# Patient Record
Sex: FEMALE | Race: WHITE | Employment: UNEMPLOYED | URBAN - METROPOLITAN AREA
[De-identification: names, ages, dates, MRNs, and addresses within clinical notes are randomized per-mention and may not be internally consistent; named-entity substitution may affect disease eponyms.]

---

## 2020-05-07 ENCOUNTER — TELEPHONE (OUTPATIENT)
Dept: PRIMARY CARE CLINIC | Age: 60
End: 2020-05-07

## 2020-05-18 ENCOUNTER — HOSPITAL ENCOUNTER (OUTPATIENT)
Age: 60
Discharge: HOME | End: 2020-05-20

## 2020-05-18 LAB
ALBUMIN SERPL-MCNC: 4.1 G/DL (ref 3.5–5.2)
ALP BLD-CCNC: 72 U/L (ref 35–104)
ALT SERPL-CCNC: 6 U/L (ref 0–32)
ANION GAP SERPL CALCULATED.3IONS-SCNC: 18 MMOL/L (ref 7–16)
AST SERPL-CCNC: 10 U/L (ref 0–31)
BACTERIA: ABNORMAL /HPF
BASOPHILS ABSOLUTE: 0.06 E9/L (ref 0–0.2)
BASOPHILS RELATIVE PERCENT: 0.5 % (ref 0–2)
BILIRUB SERPL-MCNC: 0.3 MG/DL (ref 0–1.2)
BILIRUBIN URINE: ABNORMAL
BLOOD, URINE: NEGATIVE
BUN BLDV-MCNC: 15 MG/DL (ref 8–23)
CALCIUM SERPL-MCNC: 10 MG/DL (ref 8.6–10.2)
CHLORIDE BLD-SCNC: 99 MMOL/L (ref 98–107)
CHOLESTEROL, TOTAL: 253 MG/DL (ref 0–199)
CLARITY: ABNORMAL
CO2: 23 MMOL/L (ref 22–29)
COLOR: YELLOW
CREAT SERPL-MCNC: 0.9 MG/DL (ref 0.5–1)
EOSINOPHILS ABSOLUTE: 0.12 E9/L (ref 0.05–0.5)
EOSINOPHILS RELATIVE PERCENT: 1.1 % (ref 0–6)
GFR AFRICAN AMERICAN: >60
GFR NON-AFRICAN AMERICAN: >60 ML/MIN/1.73
GLUCOSE BLD-MCNC: 155 MG/DL (ref 74–99)
GLUCOSE URINE: NEGATIVE MG/DL
HBA1C MFR BLD: 9 % (ref 4–5.6)
HCT VFR BLD CALC: 42.2 % (ref 34–48)
HDLC SERPL-MCNC: 37 MG/DL
HEMOGLOBIN: 14.4 G/DL (ref 11.5–15.5)
IMMATURE GRANULOCYTES #: 0.03 E9/L
IMMATURE GRANULOCYTES %: 0.3 % (ref 0–5)
KETONES, URINE: NEGATIVE MG/DL
LDL CHOLESTEROL CALCULATED: 171 MG/DL (ref 0–99)
LEUKOCYTE ESTERASE, URINE: ABNORMAL
LYMPHOCYTES ABSOLUTE: 3.61 E9/L (ref 1.5–4)
LYMPHOCYTES RELATIVE PERCENT: 32.9 % (ref 20–42)
MCH RBC QN AUTO: 31 PG (ref 26–35)
MCHC RBC AUTO-ENTMCNC: 34.1 % (ref 32–34.5)
MCV RBC AUTO: 90.9 FL (ref 80–99.9)
MONOCYTES ABSOLUTE: 0.6 E9/L (ref 0.1–0.95)
MONOCYTES RELATIVE PERCENT: 5.5 % (ref 2–12)
NEUTROPHILS ABSOLUTE: 6.54 E9/L (ref 1.8–7.3)
NEUTROPHILS RELATIVE PERCENT: 59.7 % (ref 43–80)
NITRITE, URINE: NEGATIVE
PDW BLD-RTO: 13.2 FL (ref 11.5–15)
PH UA: 6 (ref 5–9)
PLATELET # BLD: 424 E9/L (ref 130–450)
PMV BLD AUTO: 9.8 FL (ref 7–12)
POTASSIUM SERPL-SCNC: 4.1 MMOL/L (ref 3.5–5)
PROTEIN UA: 100 MG/DL
RBC # BLD: 4.64 E12/L (ref 3.5–5.5)
RBC UA: ABNORMAL /HPF (ref 0–2)
RENAL EPITHELIAL, UA: ABNORMAL /HPF
SODIUM BLD-SCNC: 140 MMOL/L (ref 132–146)
SPECIFIC GRAVITY UA: >=1.03 (ref 1–1.03)
TOTAL PROTEIN: 7.3 G/DL (ref 6.4–8.3)
TRIGL SERPL-MCNC: 223 MG/DL (ref 0–149)
UROBILINOGEN, URINE: 1 E.U./DL
VLDLC SERPL CALC-MCNC: 45 MG/DL
WBC # BLD: 11 E9/L (ref 4.5–11.5)
WBC UA: ABNORMAL /HPF (ref 0–5)

## 2020-05-18 PROCEDURE — 83036 HEMOGLOBIN GLYCOSYLATED A1C: CPT

## 2020-05-18 PROCEDURE — 36415 COLL VENOUS BLD VENIPUNCTURE: CPT

## 2020-05-18 PROCEDURE — 85025 COMPLETE CBC W/AUTO DIFF WBC: CPT

## 2020-05-18 PROCEDURE — 81001 URINALYSIS AUTO W/SCOPE: CPT

## 2020-05-18 PROCEDURE — 80061 LIPID PANEL: CPT

## 2020-05-18 PROCEDURE — 80053 COMPREHEN METABOLIC PANEL: CPT

## 2020-05-20 ENCOUNTER — OFFICE VISIT (OUTPATIENT)
Dept: PODIATRY | Age: 60
End: 2020-05-20
Payer: COMMERCIAL

## 2020-05-20 ENCOUNTER — TELEPHONE (OUTPATIENT)
Dept: PRIMARY CARE CLINIC | Age: 60
End: 2020-05-20

## 2020-05-20 VITALS
DIASTOLIC BLOOD PRESSURE: 78 MMHG | BODY MASS INDEX: 30.65 KG/M2 | HEIGHT: 63 IN | WEIGHT: 173 LBS | SYSTOLIC BLOOD PRESSURE: 123 MMHG | TEMPERATURE: 97.1 F

## 2020-05-20 PROCEDURE — 11721 DEBRIDE NAIL 6 OR MORE: CPT | Performed by: PODIATRIST

## 2020-05-20 PROCEDURE — 99203 OFFICE O/P NEW LOW 30 MIN: CPT | Performed by: PODIATRIST

## 2020-05-20 PROCEDURE — 3052F HG A1C>EQUAL 8.0%<EQUAL 9.0%: CPT | Performed by: PODIATRIST

## 2020-05-20 RX ORDER — KETOCONAZOLE 20 MG/ML
SHAMPOO TOPICAL
Qty: 100 ML | Refills: 1 | Status: SHIPPED
Start: 2020-05-20 | End: 2020-10-10

## 2020-05-20 RX ORDER — KETOCONAZOLE 20 MG/G
CREAM TOPICAL
Qty: 30 G | Refills: 1 | Status: SHIPPED
Start: 2020-05-20 | End: 2020-10-10

## 2020-05-20 RX ORDER — TRAMADOL HYDROCHLORIDE 50 MG/1
50 TABLET ORAL NIGHTLY
Qty: 30 TABLET | Refills: 0 | Status: SHIPPED | OUTPATIENT
Start: 2020-05-20 | End: 2020-06-19

## 2020-05-20 NOTE — PROGRESS NOTES
[facility] PODIATRY  [address] [facility] [place] ORE[place]Y [address]  Dept: [phone]  Dept Fax: [phone]    NEW PATIENT PROGRESS NOTE  Date of patient's visit: 5/20/2020  Patient's Name:  Julienne Hamman YOB: 1960            Patient Care Team:  DO Adam as PCP - General        Chief Complaint   Patient presents with   Meir Millan New Doctor     dm foot ck and neuropathy ck saw PCP Dr. Rebecca Oneill on 5/7/2020    Diabetes       HPI  HPI:   Julienne Hamman is a 60 y.o. female who presents to the office today complaining of diabetic foot pain. This new patient is seen today for a full diabetic exam of lower extremity. Patient is unable to trim her nails that are very thick and painful. Patient relates that she has neuropathy diabetic neuropathy which has progressively gotten worse the pain is mostly at bedtime and during the night the last 4 to 5 months it has been waking her up during the day she is feeling much better. .     Allergies   Allergen Reactions    Codeine     Pyridium [Phenazopyridine Hcl]        Past Medical History:   Diagnosis Date    Diabetes mellitus ([address]     Hyperlipidemia     UTI (urinary tract infection)        Prior to Admission medications    Medication Sig Start Date End Date Taking? Authorizing Provider   ketoconazole (NIZORAL) 2 % shampoo Apply topically daily as needed. 5/20/20  Yes Pilolivier Reyes DPM   ketoconazole (NIZORAL) 2 % cream Apply topically daily. 5/20/20  Yes Joanne Reyes DPM   traMADol (ULTRAM) 50 MG tablet Take 1 tablet by mouth nightly for 30 days. Intended supply: 3 days.  Take lowest dose possible to manage pain 5/20/20 6/19/20 Yes Joanne Reyes DPM   aspirin 81 MG tablet Take 81 mg by mouth daily   Yes Historical Provider, MD   buPROPion [facility]) 150 MG extended release tablet Take 150 mg by mouth 2 times daily    Historical Provider, MD   Multiple Vitamins-Minerals (THERAPEUTIC MULTIVITAMIN-MINERALS) tablet Take 1 tablet by mouth daily    Historical Provider, MD   ibuprofen (ADVIL;MOTRIN) 200 MG tablet Take 200 mg by mouth every 6 hours as needed for Pain    Historical Provider, MD   traMADol (ULTRAM) 50 MG tablet Take 50 mg by mouth every 6 hours as needed for Pain    Historical Provider, MD   lansoprazole (PREVACID) 15 MG delayed release capsule Take 15 mg by mouth daily    Historical Provider, MD   Loratadine 10 MG CAPS Take by mouth    Historical Provider, MD   calcium carbonate 600 MG TABS tablet Take 1 tablet by mouth daily    Historical Provider, MD   metFORMIN (GLUCOPHAGE) 500 MG tablet Take 1,000 mg by mouth 2 times daily (with meals)    Historical Provider, MD   simvastatin (ZOCOR) 5 MG tablet Take 5 mg by mouth every morning     Historical Provider, MD   gabapentin (NEURONTIN) 100 MG capsule Take 300 mg by mouth nightly     Historical Provider, MD   glipiZIDE (GLUCOTROL) 5 MG tablet Take 5 mg by mouth 2 times daily (before meals)    Historical Provider, MD   sitaGLIPtin (JANUVIA) 100 MG tablet Take 100 mg by mouth nightly     Historical Provider, MD   melatonin 3 MG TABS tablet Take 10 mg by mouth daily as needed     Historical Provider, MD   insulin detemir (LEVEMIR) 100 UNIT/ML injection vial Inject 65 Units into the skin every morning     Historical Provider, MD       Past Surgical History:   Procedure Laterality Date    TOTAL NEPHRECTOMY Left 09/27/2016       No family history on file. Social History     Tobacco Use    Smoking status: Current Every Day Smoker     Packs/day: 0.50     Types: Cigarettes    Smokeless tobacco: Never Used   Substance Use Topics    Alcohol use: No       Review of Systems    Review of Systems:   History obtained from chart review and the patient  General ROS: negative for - chills, fatigue, fever, night sweats or weight gain  Constitutional: Negative for chills, diaphoresis, fatigue, fever and unexpected weight change.   Musculoskeletal:

## 2020-09-24 ENCOUNTER — APPOINTMENT (OUTPATIENT)
Dept: GENERAL RADIOLOGY | Age: 60
End: 2020-09-24
Payer: COMMERCIAL

## 2020-09-24 ENCOUNTER — APPOINTMENT (OUTPATIENT)
Dept: CT IMAGING | Age: 60
End: 2020-09-24
Payer: COMMERCIAL

## 2020-09-24 ENCOUNTER — HOSPITAL ENCOUNTER (EMERGENCY)
Age: 60
Discharge: HOME | End: 2020-09-24
Attending: EMERGENCY MEDICINE
Payer: COMMERCIAL

## 2020-09-24 VITALS
BODY MASS INDEX: 29.59 KG/M2 | HEIGHT: 63 IN | RESPIRATION RATE: 16 BRPM | OXYGEN SATURATION: 98 % | SYSTOLIC BLOOD PRESSURE: 120 MMHG | TEMPERATURE: 97.3 F | WEIGHT: 167 LBS | DIASTOLIC BLOOD PRESSURE: 65 MMHG | HEART RATE: 75 BPM

## 2020-09-24 LAB
ANION GAP SERPL CALCULATED.3IONS-SCNC: 10 MMOL/L (ref 7–16)
BASOPHILS ABSOLUTE: 0.06 E9/L (ref 0–0.2)
BASOPHILS RELATIVE PERCENT: 0.6 % (ref 0–2)
BUN BLDV-MCNC: 25 MG/DL (ref 8–23)
CALCIUM SERPL-MCNC: 9.5 MG/DL (ref 8.6–10.2)
CHLORIDE BLD-SCNC: 100 MMOL/L (ref 98–107)
CO2: 21 MMOL/L (ref 22–29)
CREAT SERPL-MCNC: 1.3 MG/DL (ref 0.5–1)
EOSINOPHILS ABSOLUTE: 0.1 E9/L (ref 0.05–0.5)
EOSINOPHILS RELATIVE PERCENT: 1 % (ref 0–6)
GFR AFRICAN AMERICAN: 50
GFR NON-AFRICAN AMERICAN: 42 ML/MIN/1.73
GLUCOSE BLD-MCNC: 173 MG/DL (ref 74–99)
HCT VFR BLD CALC: 35.6 % (ref 34–48)
HEMOGLOBIN: 12.4 G/DL (ref 11.5–15.5)
IMMATURE GRANULOCYTES #: 0.02 E9/L
IMMATURE GRANULOCYTES %: 0.2 % (ref 0–5)
LYMPHOCYTES ABSOLUTE: 2.33 E9/L (ref 1.5–4)
LYMPHOCYTES RELATIVE PERCENT: 23.7 % (ref 20–42)
MCH RBC QN AUTO: 31.7 PG (ref 26–35)
MCHC RBC AUTO-ENTMCNC: 34.8 % (ref 32–34.5)
MCV RBC AUTO: 91 FL (ref 80–99.9)
MONOCYTES ABSOLUTE: 0.55 E9/L (ref 0.1–0.95)
MONOCYTES RELATIVE PERCENT: 5.6 % (ref 2–12)
NEUTROPHILS ABSOLUTE: 6.77 E9/L (ref 1.8–7.3)
NEUTROPHILS RELATIVE PERCENT: 68.9 % (ref 43–80)
PDW BLD-RTO: 13 FL (ref 11.5–15)
PLATELET # BLD: 317 E9/L (ref 130–450)
PMV BLD AUTO: 9.1 FL (ref 7–12)
POTASSIUM SERPL-SCNC: 4.1 MMOL/L (ref 3.5–5)
RBC # BLD: 3.91 E12/L (ref 3.5–5.5)
SODIUM BLD-SCNC: 131 MMOL/L (ref 132–146)
TROPONIN: <0.01 NG/ML (ref 0–0.03)
WBC # BLD: 9.8 E9/L (ref 4.5–11.5)

## 2020-09-24 PROCEDURE — 84484 ASSAY OF TROPONIN QUANT: CPT

## 2020-09-24 PROCEDURE — 99284 EMERGENCY DEPT VISIT MOD MDM: CPT

## 2020-09-24 PROCEDURE — 80048 BASIC METABOLIC PNL TOTAL CA: CPT

## 2020-09-24 PROCEDURE — 85025 COMPLETE CBC W/AUTO DIFF WBC: CPT

## 2020-09-24 PROCEDURE — 93005 ELECTROCARDIOGRAM TRACING: CPT | Performed by: EMERGENCY MEDICINE

## 2020-09-24 PROCEDURE — 70450 CT HEAD/BRAIN W/O DYE: CPT

## 2020-09-24 PROCEDURE — 99283 EMERGENCY DEPT VISIT LOW MDM: CPT

## 2020-09-24 PROCEDURE — 71045 X-RAY EXAM CHEST 1 VIEW: CPT

## 2020-09-24 ASSESSMENT — ENCOUNTER SYMPTOMS
SHORTNESS OF BREATH: 0
COUGH: 0
SINUS PRESSURE: 0
BACK PAIN: 0
DIFFICULTY BREATHING: 0
EYE DISCHARGE: 0
WHEEZING: 0
NAUSEA: 0
DOUBLE VISION: 0
BLURRED VISION: 0
DIARRHEA: 0
EYE PAIN: 0
EYE REDNESS: 0
VOMITING: 0
SORE THROAT: 0
ABDOMINAL DISTENTION: 0

## 2020-09-24 ASSESSMENT — PAIN DESCRIPTION - FREQUENCY: FREQUENCY: CONTINUOUS

## 2020-09-24 ASSESSMENT — PAIN DESCRIPTION - DESCRIPTORS: DESCRIPTORS: OTHER (COMMENT)

## 2020-09-24 ASSESSMENT — PAIN - FUNCTIONAL ASSESSMENT: PAIN_FUNCTIONAL_ASSESSMENT: PREVENTS OR INTERFERES SOME ACTIVE ACTIVITIES AND ADLS

## 2020-09-24 ASSESSMENT — PAIN DESCRIPTION - LOCATION: LOCATION: HEAD

## 2020-09-24 ASSESSMENT — PAIN SCALES - GENERAL: PAINLEVEL_OUTOF10: 9

## 2020-09-24 ASSESSMENT — PAIN DESCRIPTION - ORIENTATION: ORIENTATION: LEFT;POSTERIOR

## 2020-09-24 ASSESSMENT — PAIN DESCRIPTION - ONSET: ONSET: SUDDEN

## 2020-09-24 ASSESSMENT — PAIN DESCRIPTION - PAIN TYPE: TYPE: ACUTE PAIN

## 2020-09-24 NOTE — ED PROVIDER NOTES
70-year-old female presents to the emergency department after she had a fall and hit her head on cement earlier today. She states that she did not eat all day today and this is why she believes she became dizzy. She states she got dizzy and fell hitting her head on cement she states she had some mild bleeding but that was her has resolved. She states she is on no blood thinners she states mild low back pain and left elbow pain but states no other injuries. She states no other complaints at this time had no loss of consciousness and no other symptoms. The history is provided by the patient. Head Injury   Location:  L parietal  Time since incident:  2 hours  Mechanism of injury: fall    Fall:     Impact surface:  [place]    Point of impact:  Head  Pain details:     Quality:  Aching    Severity:  Mild    Duration:  2 hours    Timing:  Intermittent    Progression:  Waxing and waning  Chronicity:  New  Relieved by:  Nothing  Worsened by:  Nothing  Ineffective treatments:  None tried  Associated symptoms: no blurred vision, no difficulty breathing, no double vision, no headaches, no hearing loss, no nausea, no neck pain, no numbness, no tinnitus and no vomiting         Review of Systems   Constitutional: Negative for chills and fever. HENT: Negative for ear pain, hearing loss, sinus pressure, sore throat and tinnitus. Eyes: Negative for blurred vision, double vision, pain, discharge and redness. Respiratory: Negative for cough, shortness of breath and wheezing. Cardiovascular: Negative for chest pain. Gastrointestinal: Negative for abdominal distention, diarrhea, nausea and vomiting. Genitourinary: Negative for dysuria and frequency. Musculoskeletal: Negative for arthralgias, back pain and neck pain. Skin: Negative for rash and wound. Neurological: Positive for dizziness. Negative for weakness, numbness and headaches. Hematological: Negative for adenopathy.    All other systems reviewed total nephrectomy (Left, 09/27/2016). Social History:  reports that she has been smoking cigarettes. She has been smoking about 0.50 packs per day. She has never used smokeless tobacco. She reports that she does not drink alcohol or use drugs. Family History: family history is not on file. The patients home medications have been reviewed.     Allergies: Codeine and Pyridium [phenazopyridine hcl]    -------------------------------------------------- RESULTS -------------------------------------------------  Labs:  Results for orders placed or performed during the hospital encounter of 09/24/20   CBC Auto Differential   Result Value Ref Range    WBC 9.8 4.5 - 11.5 E9/L    RBC 3.91 3.50 - 5.50 E12/L    Hemoglobin 12.4 11.5 - 15.5 g/dL    Hematocrit 35.6 34.0 - 48.0 %    MCV 91.0 80.0 - 99.9 fL    MCH 31.7 26.0 - 35.0 pg    MCHC 34.8 (H) 32.0 - 34.5 %    RDW 13.0 11.5 - 15.0 fL    Platelets 598 076 - 291 E9/L    MPV 9.1 7.0 - 12.0 fL    Neutrophils % 68.9 43.0 - 80.0 %    Immature Granulocytes % 0.2 0.0 - 5.0 %    Lymphocytes % 23.7 20.0 - 42.0 %    Monocytes % 5.6 2.0 - 12.0 %    Eosinophils % 1.0 0.0 - 6.0 %    Basophils % 0.6 0.0 - 2.0 %    Neutrophils Absolute 6.77 1.80 - 7.30 E9/L    Immature Granulocytes # 0.02 E9/L    Lymphocytes Absolute 2.33 1.50 - 4.00 E9/L    Monocytes Absolute 0.55 0.10 - 0.95 E9/L    Eosinophils Absolute 0.10 0.05 - 0.50 E9/L    Basophils Absolute 0.06 0.00 - 0.20 W2/E   Basic Metabolic Panel   Result Value Ref Range    Sodium 131 (L) 132 - 146 mmol/L    Potassium 4.1 3.5 - 5.0 mmol/L    Chloride 100 98 - 107 mmol/L    CO2 21 (L) 22 - 29 mmol/L    Anion Gap 10 7 - 16 mmol/L    Glucose 173 (H) 74 - 99 mg/dL    BUN 25 (H) 8 - 23 mg/dL    CREATININE 1.3 (H) 0.5 - 1.0 mg/dL    GFR Non-African American 42 >=60 mL/min/1.73    GFR African American 50     Calcium 9.5 8.6 - 10.2 mg/dL   Troponin   Result Value Ref Range    Troponin <0.01 0.00 - 0.03 ng/mL   EKG 12 Lead   Result Value Ref Range    Ventricular Rate 70 BPM    Atrial Rate 70 BPM    P-R Interval 158 ms    QRS Duration 64 ms    Q-T Interval 388 ms    QTc Calculation (Bazett) 419 ms    P Axis 60 degrees    R Axis 53 degrees    T Axis 55 degrees       Radiology:  XR CHEST PORTABLE   Final Result   No acute cardiopulmonary process. CT HEAD WO CONTRAST   Final Result   No indication for an acute intracranial process. EKG: This EKG is signed and interpreted by the EP. Time: 18:42  Rate: 70  Rhythm: Sinus  Interpretation: NSR, low voltage,   Comparison: stable as compared to patient's most recent EKG    ------------------------- NURSING NOTES AND VITALS REVIEWED ---------------------------  Date / Time Roomed:  9/24/2020  4:58 PM  ED Bed Assignment:  [facility] [id number]    The nursing notes within the ED encounter and vital signs as below have been reviewed. /65   Pulse 75   Temp 97.3 °F (36.3 °C) (Temporal)   Resp 16   Ht 5' 3\" (1.6 m)   Wt 167 lb (75.8 kg)   SpO2 98%   BMI 29.58 kg/m²   Oxygen Saturation Interpretation: Normal      ------------------------------------------ PROGRESS NOTES ------------------------------------------  I have spoken with the patient and discussed todays results, in addition to providing specific details for the plan of care and counseling regarding the diagnosis and prognosis. Their questions are answered at this time and they are agreeable with the plan. I discussed at length with them reasons for immediate return here for re evaluation. They will followup with their primary care physician by calling their office tomorrow. --------------------------------- ADDITIONAL PROVIDER NOTES ---------------------------------  At this time the patient is without objective evidence of an acute process requiring hospitalization or inpatient management. They have remained hemodynamically stable throughout their entire ED visit and are stable for discharge with outpatient follow-up.      The plan has been discussed in detail and they are aware of the specific conditions for emergent return, as well as the importance of follow-up. Discharge Medication List as of 9/24/2020  6:43 PM          Diagnosis:  1. Injury of head, initial encounter    2. Dizziness    3. Abrasion of left elbow, initial encounter        Disposition:  Patient's disposition: Discharge to home  Patient's condition is stable.          Pal Telles DO  09/27/20 [id number]

## 2020-09-25 LAB
EKG ATRIAL RATE: 70 BPM
EKG P AXIS: 60 DEGREES
EKG P-R INTERVAL: 158 MS
EKG Q-T INTERVAL: 388 MS
EKG QRS DURATION: 64 MS
EKG QTC CALCULATION (BAZETT): 419 MS
EKG R AXIS: 53 DEGREES
EKG T AXIS: 55 DEGREES
EKG VENTRICULAR RATE: 70 BPM

## 2020-10-06 ENCOUNTER — HOSPITAL ENCOUNTER (OUTPATIENT)
Age: 60
Discharge: HOME | End: 2020-10-08
Payer: COMMERCIAL

## 2020-10-06 ENCOUNTER — OFFICE VISIT (OUTPATIENT)
Dept: FAMILY MEDICINE CLINIC | Age: 60
End: 2020-10-06
Payer: COMMERCIAL

## 2020-10-06 VITALS
TEMPERATURE: 96.7 F | DIASTOLIC BLOOD PRESSURE: 78 MMHG | HEIGHT: 63 IN | RESPIRATION RATE: 18 BRPM | OXYGEN SATURATION: 98 % | BODY MASS INDEX: 30.12 KG/M2 | SYSTOLIC BLOOD PRESSURE: 126 MMHG | HEART RATE: 75 BPM | WEIGHT: 170 LBS

## 2020-10-06 PROBLEM — E86.0 DEHYDRATION: Status: ACTIVE | Noted: 2020-10-06

## 2020-10-06 PROBLEM — E11.9 TYPE 2 DIABETES MELLITUS WITHOUT COMPLICATION, WITHOUT LONG-TERM CURRENT USE OF INSULIN (HCC): Status: ACTIVE | Noted: 2020-10-06

## 2020-10-06 PROBLEM — G93.89 MASS, BRAIN: Status: ACTIVE | Noted: 2020-10-06

## 2020-10-06 PROBLEM — R55 VASOVAGAL SYNCOPE: Status: ACTIVE | Noted: 2020-10-06

## 2020-10-06 LAB
ALBUMIN SERPL-MCNC: 4.1 G/DL (ref 3.5–5.2)
ALP BLD-CCNC: 78 U/L (ref 35–104)
ALT SERPL-CCNC: 7 U/L (ref 0–32)
ANION GAP SERPL CALCULATED.3IONS-SCNC: 16 MMOL/L (ref 7–16)
AST SERPL-CCNC: 13 U/L (ref 0–31)
BASOPHILS ABSOLUTE: 0.05 E9/L (ref 0–0.2)
BASOPHILS RELATIVE PERCENT: 0.5 % (ref 0–2)
BILIRUB SERPL-MCNC: 0.2 MG/DL (ref 0–1.2)
BUN BLDV-MCNC: 21 MG/DL (ref 8–23)
CALCIUM SERPL-MCNC: 9.7 MG/DL (ref 8.6–10.2)
CHLORIDE BLD-SCNC: 102 MMOL/L (ref 98–107)
CHOLESTEROL, TOTAL: 209 MG/DL (ref 0–199)
CO2: 20 MMOL/L (ref 22–29)
CREAT SERPL-MCNC: 2 MG/DL (ref 0.5–1)
EOSINOPHILS ABSOLUTE: 0.18 E9/L (ref 0.05–0.5)
EOSINOPHILS RELATIVE PERCENT: 1.8 % (ref 0–6)
GFR AFRICAN AMERICAN: 31
GFR NON-AFRICAN AMERICAN: 25 ML/MIN/1.73
GLUCOSE BLD-MCNC: 134 MG/DL (ref 74–99)
HBA1C MFR BLD: 6.8 % (ref 4–5.6)
HCT VFR BLD CALC: 40.7 % (ref 34–48)
HDLC SERPL-MCNC: 38 MG/DL
HEMOGLOBIN: 13 G/DL (ref 11.5–15.5)
IMMATURE GRANULOCYTES #: 0.02 E9/L
IMMATURE GRANULOCYTES %: 0.2 % (ref 0–5)
LDL CHOLESTEROL CALCULATED: 130 MG/DL (ref 0–99)
LYMPHOCYTES ABSOLUTE: 2.94 E9/L (ref 1.5–4)
LYMPHOCYTES RELATIVE PERCENT: 29.3 % (ref 20–42)
MCH RBC QN AUTO: 31.1 PG (ref 26–35)
MCHC RBC AUTO-ENTMCNC: 31.9 % (ref 32–34.5)
MCV RBC AUTO: 97.4 FL (ref 80–99.9)
MONOCYTES ABSOLUTE: 0.6 E9/L (ref 0.1–0.95)
MONOCYTES RELATIVE PERCENT: 6 % (ref 2–12)
NEUTROPHILS ABSOLUTE: 6.26 E9/L (ref 1.8–7.3)
NEUTROPHILS RELATIVE PERCENT: 62.2 % (ref 43–80)
PDW BLD-RTO: 13.2 FL (ref 11.5–15)
PLATELET # BLD: 366 E9/L (ref 130–450)
PMV BLD AUTO: 9.6 FL (ref 7–12)
POTASSIUM SERPL-SCNC: 5.1 MMOL/L (ref 3.5–5)
RBC # BLD: 4.18 E12/L (ref 3.5–5.5)
SODIUM BLD-SCNC: 138 MMOL/L (ref 132–146)
T4 TOTAL: 7.9 MCG/DL (ref 4.5–11.7)
TOTAL PROTEIN: 7.2 G/DL (ref 6.4–8.3)
TRIGL SERPL-MCNC: 204 MG/DL (ref 0–149)
TSH SERPL DL<=0.05 MIU/L-ACNC: 0.89 UIU/ML (ref 0.27–4.2)
VITAMIN D 25-HYDROXY: 11 NG/ML (ref 30–100)
VLDLC SERPL CALC-MCNC: 41 MG/DL
WBC # BLD: 10.1 E9/L (ref 4.5–11.5)

## 2020-10-06 PROCEDURE — 84443 ASSAY THYROID STIM HORMONE: CPT

## 2020-10-06 PROCEDURE — 84436 ASSAY OF TOTAL THYROXINE: CPT

## 2020-10-06 PROCEDURE — 82306 VITAMIN D 25 HYDROXY: CPT

## 2020-10-06 PROCEDURE — 3052F HG A1C>EQUAL 8.0%<EQUAL 9.0%: CPT | Performed by: FAMILY MEDICINE

## 2020-10-06 PROCEDURE — 36415 COLL VENOUS BLD VENIPUNCTURE: CPT

## 2020-10-06 PROCEDURE — 80061 LIPID PANEL: CPT

## 2020-10-06 PROCEDURE — 83036 HEMOGLOBIN GLYCOSYLATED A1C: CPT

## 2020-10-06 PROCEDURE — 80053 COMPREHEN METABOLIC PANEL: CPT

## 2020-10-06 PROCEDURE — 99204 OFFICE O/P NEW MOD 45 MIN: CPT | Performed by: FAMILY MEDICINE

## 2020-10-06 PROCEDURE — 85025 COMPLETE CBC W/AUTO DIFF WBC: CPT

## 2020-10-06 RX ORDER — GLIPIZIDE 5 MG/1
TABLET ORAL
Qty: 30 TABLET | Refills: 1 | Status: SHIPPED
Start: 2020-10-06 | End: 2021-02-25

## 2020-10-06 ASSESSMENT — PATIENT HEALTH QUESTIONNAIRE - PHQ9
SUM OF ALL RESPONSES TO PHQ QUESTIONS 1-9: 0
SUM OF ALL RESPONSES TO PHQ9 QUESTIONS 1 & 2: 0
2. FEELING DOWN, DEPRESSED OR HOPELESS: 0
SUM OF ALL RESPONSES TO PHQ QUESTIONS 1-9: 0
1. LITTLE INTEREST OR PLEASURE IN DOING THINGS: 0

## 2020-10-06 ASSESSMENT — ENCOUNTER SYMPTOMS
EYES NEGATIVE: 1
RESPIRATORY NEGATIVE: 1
ALLERGIC/IMMUNOLOGIC NEGATIVE: 1
GASTROINTESTINAL NEGATIVE: 1

## 2020-10-06 NOTE — PROGRESS NOTES
10/6/20  Name: Frankie Long    : 1960    Sex: female    Age: 63 y.o. Subjective:  Chief Complaint: Patient is here for follow up from er     Not to me in yearls      Two wek sago  To  Er with  Getting  Dizzy and fell back on head  At work      [employer] down    Lab with   elev  Kidney func nd  bs  Wt dwon from  250  She  Say snto on diet     Here with Gf  Ct in er with left postr  Cerebellar   Lesion  She had lab  May and  [facility] to see me  After      ghb high    Was on diab med and chol med in past   Super non comapolint      Review of Systems   Constitutional: Negative. HENT: Negative. Eyes: Negative. Respiratory: Negative. Cardiovascular: Negative. Gastrointestinal: Negative. Endocrine: Negative. Genitourinary: Negative. Musculoskeletal: Negative. Skin: Negative. Allergic/Immunologic: Negative. Neurological: Negative. Hematological: Negative. Psychiatric/Behavioral: Negative. Current Outpatient Medications:     glipiZIDE (GLUCOTROL) 5 MG tablet, One half tab before breakfast and supper, Disp: 30 tablet, Rfl: 1    ketoconazole (NIZORAL) 2 % shampoo, Apply topically daily as needed. , Disp: 100 mL, Rfl: 1    ketoconazole (NIZORAL) 2 % cream, Apply topically daily. , Disp: 30 g, Rfl: 1    buPROPion (WELLBUTRIN SR) 150 MG extended release tablet, Take 150 mg by mouth 2 times daily, Disp: , Rfl:     Multiple Vitamins-Minerals (THERAPEUTIC MULTIVITAMIN-MINERALS) tablet, Take 1 tablet by mouth daily, Disp: , Rfl:     ibuprofen (ADVIL;MOTRIN) 200 MG tablet, Take 200 mg by mouth every 6 hours as needed for Pain, Disp: , Rfl:     traMADol (ULTRAM) 50 MG tablet, Take 50 mg by mouth every 6 hours as needed for Pain, Disp: , Rfl:     lansoprazole (PREVACID) 15 MG delayed release capsule, Take 15 mg by mouth daily, Disp: , Rfl:     Loratadine 10 MG CAPS, Take by mouth, Disp: , Rfl:     calcium carbonate 600 MG TABS tablet, Take 1 tablet by mouth daily, Disp: , Rfl:     metFORMIN (GLUCOPHAGE) 500 MG tablet, Take 1,000 mg by mouth 2 times daily (with meals), Disp: , Rfl:     aspirin 81 MG tablet, Take 81 mg by mouth daily, Disp: , Rfl:     simvastatin (ZOCOR) 5 MG tablet, Take 5 mg by mouth every morning , Disp: , Rfl:     gabapentin (NEURONTIN) 100 MG capsule, Take 300 mg by mouth nightly , Disp: , Rfl:     glipiZIDE (GLUCOTROL) 5 MG tablet, Take 5 mg by mouth 2 times daily (before meals), Disp: , Rfl:     sitaGLIPtin (JANUVIA) 100 MG tablet, Take 100 mg by mouth nightly , Disp: , Rfl:     melatonin 3 MG TABS tablet, Take 10 mg by mouth daily as needed , Disp: , Rfl:     insulin detemir (LEVEMIR) 100 UNIT/ML injection vial, Inject 65 Units into the skin every morning , Disp: , Rfl:   Allergies   Allergen Reactions    Codeine     Pyridium [Phenazopyridine Hcl]      Social History     Socioeconomic History    Marital status:      Spouse name: Not on file    Number of children: Not on file    Years of education: Not on file    Highest education level: Not on file   Occupational History    Not on file   Social Needs    Financial resource strain: Not on file    Food insecurity     Worry: Not on file     Inability: Not on file    Transportation needs     Medical: Not on file     Non-medical: Not on file   Tobacco Use    Smoking status: Current Every Day Smoker     Packs/day: 0.50     Types: Cigarettes    Smokeless tobacco: Never Used   Substance and Sexual Activity    Alcohol use: No    Drug use: No    Sexual activity: Not on file   Lifestyle    Physical activity     Days per week: Not on file     Minutes per session: Not on file    Stress: Not on file   Relationships    Social connections     Talks on phone: Not on file     Gets together: Not on file     Attends Mu-ism service: Not on file     Active member of club or organization: Not on file     Attends meetings of clubs or organizations: Not on file     Relationship status: Not on file   Claude Sellers issues. Also reviewing the chart before entering the room with patient and finishing charting after leaving patient's room. More than half of that time was spent face to face with the patient in counseling and coordinating care. Sx to er      admised not work and pt refuses  ---pt  tody ----    Follow Up: Return for aturday am with  bs.      Seen by:  Yaima Naylor, DO

## 2020-10-07 ENCOUNTER — OFFICE VISIT (OUTPATIENT)
Dept: FAMILY MEDICINE CLINIC | Age: 60
End: 2020-10-07
Payer: COMMERCIAL

## 2020-10-07 ENCOUNTER — HOSPITAL ENCOUNTER (EMERGENCY)
Age: 60
Discharge: HOME | End: 2020-10-07
Attending: EMERGENCY MEDICINE
Payer: COMMERCIAL

## 2020-10-07 ENCOUNTER — TELEPHONE (OUTPATIENT)
Dept: PRIMARY CARE CLINIC | Age: 60
End: 2020-10-07

## 2020-10-07 VITALS
WEIGHT: 170 LBS | DIASTOLIC BLOOD PRESSURE: 82 MMHG | SYSTOLIC BLOOD PRESSURE: 128 MMHG | BODY MASS INDEX: 30.11 KG/M2 | TEMPERATURE: 97.8 F

## 2020-10-07 VITALS
HEART RATE: 69 BPM | RESPIRATION RATE: 18 BRPM | WEIGHT: 170 LBS | OXYGEN SATURATION: 98 % | DIASTOLIC BLOOD PRESSURE: 64 MMHG | SYSTOLIC BLOOD PRESSURE: 129 MMHG | TEMPERATURE: 98.4 F | HEIGHT: 63 IN | BODY MASS INDEX: 30.12 KG/M2

## 2020-10-07 LAB
ALBUMIN SERPL-MCNC: 4.2 G/DL (ref 3.5–5.2)
ALP BLD-CCNC: 86 U/L (ref 35–104)
ALT SERPL-CCNC: 8 U/L (ref 0–32)
ANION GAP SERPL CALCULATED.3IONS-SCNC: 11 MMOL/L (ref 7–16)
APTT: 30.3 SEC (ref 24.5–35.1)
AST SERPL-CCNC: 11 U/L (ref 0–31)
BASOPHILS ABSOLUTE: 0.05 E9/L (ref 0–0.2)
BASOPHILS RELATIVE PERCENT: 0.5 % (ref 0–2)
BILIRUB SERPL-MCNC: 0.4 MG/DL (ref 0–1.2)
BUN BLDV-MCNC: 21 MG/DL (ref 8–23)
CALCIUM SERPL-MCNC: 9.9 MG/DL (ref 8.6–10.2)
CHLORIDE BLD-SCNC: 103 MMOL/L (ref 98–107)
CO2: 24 MMOL/L (ref 22–29)
CREAT SERPL-MCNC: 1.5 MG/DL (ref 0.5–1)
EKG ATRIAL RATE: 71 BPM
EKG P AXIS: 48 DEGREES
EKG P-R INTERVAL: 134 MS
EKG Q-T INTERVAL: 398 MS
EKG QRS DURATION: 80 MS
EKG QTC CALCULATION (BAZETT): 432 MS
EKG R AXIS: 48 DEGREES
EKG T AXIS: 45 DEGREES
EKG VENTRICULAR RATE: 71 BPM
EOSINOPHILS ABSOLUTE: 0.16 E9/L (ref 0.05–0.5)
EOSINOPHILS RELATIVE PERCENT: 1.5 % (ref 0–6)
GFR AFRICAN AMERICAN: 43
GFR NON-AFRICAN AMERICAN: 35 ML/MIN/1.73
GLUCOSE BLD-MCNC: 138 MG/DL (ref 74–99)
HCT VFR BLD CALC: 39.8 % (ref 34–48)
HEMOGLOBIN: 13.4 G/DL (ref 11.5–15.5)
IMMATURE GRANULOCYTES #: 0.04 E9/L
IMMATURE GRANULOCYTES %: 0.4 % (ref 0–5)
INR BLD: 1
LYMPHOCYTES ABSOLUTE: 2.96 E9/L (ref 1.5–4)
LYMPHOCYTES RELATIVE PERCENT: 27.1 % (ref 20–42)
MCH RBC QN AUTO: 31.8 PG (ref 26–35)
MCHC RBC AUTO-ENTMCNC: 33.7 % (ref 32–34.5)
MCV RBC AUTO: 94.3 FL (ref 80–99.9)
MONOCYTES ABSOLUTE: 0.56 E9/L (ref 0.1–0.95)
MONOCYTES RELATIVE PERCENT: 5.1 % (ref 2–12)
NEUTROPHILS ABSOLUTE: 7.14 E9/L (ref 1.8–7.3)
NEUTROPHILS RELATIVE PERCENT: 65.4 % (ref 43–80)
PDW BLD-RTO: 13.1 FL (ref 11.5–15)
PLATELET # BLD: 385 E9/L (ref 130–450)
PMV BLD AUTO: 9.3 FL (ref 7–12)
POTASSIUM REFLEX MAGNESIUM: 4.3 MMOL/L (ref 3.5–5)
PROTHROMBIN TIME: 11.5 SEC (ref 9.3–12.4)
RBC # BLD: 4.22 E12/L (ref 3.5–5.5)
SODIUM BLD-SCNC: 138 MMOL/L (ref 132–146)
TOTAL PROTEIN: 7.6 G/DL (ref 6.4–8.3)
WBC # BLD: 10.9 E9/L (ref 4.5–11.5)

## 2020-10-07 PROCEDURE — 93005 ELECTROCARDIOGRAM TRACING: CPT | Performed by: EMERGENCY MEDICINE

## 2020-10-07 PROCEDURE — 99283 EMERGENCY DEPT VISIT LOW MDM: CPT

## 2020-10-07 PROCEDURE — 85025 COMPLETE CBC W/AUTO DIFF WBC: CPT

## 2020-10-07 PROCEDURE — 2580000003 HC RX 258: Performed by: EMERGENCY MEDICINE

## 2020-10-07 PROCEDURE — 85730 THROMBOPLASTIN TIME PARTIAL: CPT

## 2020-10-07 PROCEDURE — 99213 OFFICE O/P EST LOW 20 MIN: CPT | Performed by: FAMILY MEDICINE

## 2020-10-07 PROCEDURE — 85610 PROTHROMBIN TIME: CPT

## 2020-10-07 PROCEDURE — 80053 COMPREHEN METABOLIC PANEL: CPT

## 2020-10-07 RX ORDER — 0.9 % SODIUM CHLORIDE 0.9 %
1000 INTRAVENOUS SOLUTION INTRAVENOUS ONCE
Status: COMPLETED | OUTPATIENT
Start: 2020-10-07 | End: 2020-10-07

## 2020-10-07 RX ORDER — MECLIZINE HYDROCHLORIDE 25 MG/1
25 TABLET ORAL 3 TIMES DAILY PRN
Qty: 15 TABLET | Refills: 0 | Status: SHIPPED | OUTPATIENT
Start: 2020-10-07 | End: 2020-10-10

## 2020-10-07 RX ADMIN — SODIUM CHLORIDE 1000 ML: 9 INJECTION, SOLUTION INTRAVENOUS at 11:27

## 2020-10-07 ASSESSMENT — ENCOUNTER SYMPTOMS
GASTROINTESTINAL NEGATIVE: 1
RESPIRATORY NEGATIVE: 1

## 2020-10-07 NOTE — PROGRESS NOTES
10/7/20  Name: Gisel Haile    : 1960    Sex: female    Age: 63 y.o. Subjective:  Chief Complaint: Patient is here for er follow up     This am   Went to bathroom and saw lights and lied down and felt fine   I saw lab and sent to er  This am  They  Gave her  Iv fludsi and and crea  Dec  1-5  From  2-0       nto ck bs  At home      Review of Systems   Respiratory: Negative. Cardiovascular: Negative. Gastrointestinal: Negative. Current Outpatient Medications:     meclizine (ANTIVERT) 25 MG tablet, Take 1 tablet by mouth 3 times daily as needed for Dizziness, Disp: 15 tablet, Rfl: 0    glipiZIDE (GLUCOTROL) 5 MG tablet, One half tab before breakfast and supper, Disp: 30 tablet, Rfl: 1    ketoconazole (NIZORAL) 2 % shampoo, Apply topically daily as needed. , Disp: 100 mL, Rfl: 1    ketoconazole (NIZORAL) 2 % cream, Apply topically daily. , Disp: 30 g, Rfl: 1    buPROPion (WELLBUTRIN SR) 150 MG extended release tablet, Take 150 mg by mouth 2 times daily, Disp: , Rfl:     Multiple Vitamins-Minerals (THERAPEUTIC MULTIVITAMIN-MINERALS) tablet, Take 1 tablet by mouth daily, Disp: , Rfl:     ibuprofen (ADVIL;MOTRIN) 200 MG tablet, Take 200 mg by mouth every 6 hours as needed for Pain, Disp: , Rfl:     traMADol (ULTRAM) 50 MG tablet, Take 50 mg by mouth every 6 hours as needed for Pain, Disp: , Rfl:     lansoprazole (PREVACID) 15 MG delayed release capsule, Take 15 mg by mouth daily, Disp: , Rfl:     Loratadine 10 MG CAPS, Take by mouth, Disp: , Rfl:     calcium carbonate 600 MG TABS tablet, Take 1 tablet by mouth daily, Disp: , Rfl:     metFORMIN (GLUCOPHAGE) 500 MG tablet, Take 1,000 mg by mouth 2 times daily (with meals), Disp: , Rfl:     aspirin 81 MG tablet, Take 81 mg by mouth daily, Disp: , Rfl:     simvastatin (ZOCOR) 5 MG tablet, Take 5 mg by mouth every morning , Disp: , Rfl:     gabapentin (NEURONTIN) 100 MG capsule, Take 300 mg by mouth nightly , Disp: , Rfl:    glipiZIDE (GLUCOTROL) 5 MG tablet, Take 5 mg by mouth 2 times daily (before meals), Disp: , Rfl:     sitaGLIPtin (JANUVIA) 100 MG tablet, Take 100 mg by mouth nightly , Disp: , Rfl:     melatonin 3 MG TABS tablet, Take 10 mg by mouth daily as needed , Disp: , Rfl:     insulin detemir (LEVEMIR) 100 UNIT/ML injection vial, Inject 65 Units into the skin every morning , Disp: , Rfl:   Allergies   Allergen Reactions    Codeine     Pyridium [Phenazopyridine Hcl]      Social History     Socioeconomic History    Marital status:      Spouse name: Not on file    Number of children: Not on file    Years of education: Not on file    Highest education level: Not on file   Occupational History    Not on file   Social Needs    Financial resource strain: Not on file    Food insecurity     Worry: Not on file     Inability: Not on file    Transportation needs     Medical: Not on file     Non-medical: Not on file   Tobacco Use    Smoking status: Current Every Day Smoker     Packs/day: 0.50     Types: Cigarettes    Smokeless tobacco: Never Used   Substance and Sexual Activity    Alcohol use: No    Drug use: No    Sexual activity: Not on file   Lifestyle    Physical activity     Days per week: Not on file     Minutes per session: Not on file    Stress: Not on file   Relationships    Social connections     Talks on phone: Not on file     Gets together: Not on file     Attends [employer] service: Not on file     Active member of club or organization: Not on file     Attends meetings of clubs or organizations: Not on file     Relationship status: Not on file    Intimate partner violence     Fear of current or ex partner: Not on file     Emotionally abused: Not on file     Physically abused: Not on file     Forced sexual activity: Not on file   Other Topics Concern    Not on file   Social History Narrative    Not on file      Past Medical History:   Diagnosis Date    Diabetes mellitus ([facility] [address]     Hyperlipidemia

## 2020-10-07 NOTE — TELEPHONE ENCOUNTER
Let patient know that her kidney function is much worse than when she was in the emergency room. She needs to go back to the emergency room I let them know that I sent her there because of acute kidney issues. I am afraid to wait a couple days on this because it could progressively get into big trouble.   I think she will be fine but we need to jump on this today

## 2020-10-07 NOTE — TELEPHONE ENCOUNTER
LM to return call ASAP. Able to reach out to spouse, Kira Menjivar.   She is at work, but will try to reach pt and send to ER right away

## 2020-10-07 NOTE — ED PROVIDER NOTES
HPI:  10/7/20,   Time: 11:12 AM EDT       Emmie Guerra is a 63 y.o. female presenting to the ED for abnml labs/dizziness, beginning unk ago. The complaint has been persistent, mild in severity, and worsened by movement of head. Seen here prvs for lightheadeness and fall, improvement in those sx. Saw pcp outpt, told cr elevated and to come to ed. Chart reviewed, cr 2. Pt states feels better with lightheaded episodes, no c/o currently, in ed b/c pcp told to come    Review of Systems:   Pertinent positives and negatives are stated within HPI, all other systems reviewed and are negative.          --------------------------------------------- PAST HISTORY ---------------------------------------------  Past Medical History:  has a past medical history of Diabetes mellitus ([facility] [address] Hyperlipidemia, and UTI (urinary tract infection). Past Surgical History:  has a past surgical history that includes total nephrectomy (Left, 09/27/2016). Social History:  reports that she has been smoking cigarettes. She has been smoking about 0.50 packs per day. She has never used smokeless tobacco. She reports that she does not drink alcohol or use drugs. Family History: family history is not on file. The patients home medications have been reviewed. Allergies: Codeine and Pyridium [phenazopyridine hcl]        ---------------------------------------------------PHYSICAL EXAM--------------------------------------    Constitutional/General: Alert and oriented x3, well appearing, non toxic in NAD  Head: Normocephalic and atraumatic  Eyes: PERRL, EOMI, conjunctive normal, sclera non icteric  Mouth: Oropharynx clear, handling secretions, no trismus, no asymmetry of the posterior oropharynx or uvular edema  Neck: Supple, full ROM, non tender to palpation in the midline, no stridor, no crepitus, no meningeal signs  Respiratory: Lungs clear to auscultation bilaterally, no wheezes, rales, or rhonchi.  Not in respiratory distress  Cardiovascular:  Regular rate. Regular rhythm. No murmurs, gallops, or rubs. 2+ distal pulses  Chest: No chest wall tenderness  GI:  Abdomen Soft, Non tender, Non distended. +BS. No organomegaly, no palpable masses,  No rebound, guarding, or rigidity. Musculoskeletal: Moves all extremities x 4. Warm and well perfused, no clubbing, cyanosis, or edema. Capillary refill <3 seconds  Integument: skin warm and dry. No rashes. Lymphatic: no lymphadenopathy noted  Neurologic: GCS 15, no focal deficits, symmetric strength 5/5 in the upper and lower extremities bilaterally  Psychiatric: Normal Affect    -------------------------------------------------- RESULTS -------------------------------------------------  I have personally reviewed all laboratory and imaging results for this patient. Results are listed below.      LABS:  Results for orders placed or performed during the hospital encounter of 10/07/20   CBC auto differential   Result Value Ref Range    WBC 10.9 4.5 - 11.5 E9/L    RBC 4.22 3.50 - 5.50 E12/L    Hemoglobin 13.4 11.5 - 15.5 g/dL    Hematocrit 39.8 34.0 - 48.0 %    MCV 94.3 80.0 - 99.9 fL    MCH 31.8 26.0 - 35.0 pg    MCHC 33.7 32.0 - 34.5 %    RDW 13.1 11.5 - 15.0 fL    Platelets 085 565 - 379 E9/L    MPV 9.3 7.0 - 12.0 fL    Neutrophils % 65.4 43.0 - 80.0 %    Immature Granulocytes % 0.4 0.0 - 5.0 %    Lymphocytes % 27.1 20.0 - 42.0 %    Monocytes % 5.1 2.0 - 12.0 %    Eosinophils % 1.5 0.0 - 6.0 %    Basophils % 0.5 0.0 - 2.0 %    Neutrophils Absolute 7.14 1.80 - 7.30 E9/L    Immature Granulocytes # 0.04 E9/L    Lymphocytes Absolute 2.96 1.50 - 4.00 E9/L    Monocytes Absolute 0.56 0.10 - 0.95 E9/L    Eosinophils Absolute 0.16 0.05 - 0.50 E9/L    Basophils Absolute 0.05 0.00 - 0.20 E9/L   Comprehensive Metabolic Panel w/ Reflex to MG   Result Value Ref Range    Sodium 138 132 - 146 mmol/L    Potassium reflex Magnesium 4.3 3.5 - 5.0 mmol/L    Chloride 103 98 - 107 mmol/L    CO2 24 22 - 29 mmol/L    Anion Gap 11 7 - 16 mmol/L    Glucose 138 (H) 74 - 99 mg/dL    BUN 21 8 - 23 mg/dL    CREATININE 1.5 (H) 0.5 - 1.0 mg/dL    GFR Non-African American 35 >=60 mL/min/1.73    GFR African American 43     Calcium 9.9 8.6 - 10.2 mg/dL    Total Protein 7.6 6.4 - 8.3 g/dL    Alb 4.2 3.5 - 5.2 g/dL    Total Bilirubin 0.4 0.0 - 1.2 mg/dL    Alkaline Phosphatase 86 35 - 104 U/L    ALT 8 0 - 32 U/L    AST 11 0 - 31 U/L   Protime-INR   Result Value Ref Range    Protime 11.5 9.3 - 12.4 sec    INR 1.0    APTT   Result Value Ref Range    aPTT 30.3 24.5 - 35.1 sec   EKG 12 Lead   Result Value Ref Range    Ventricular Rate 71 BPM    Atrial Rate 71 BPM    P-R Interval 134 ms    QRS Duration 80 ms    Q-T Interval 398 ms    QTc Calculation (Bazett) 432 ms    P Axis 48 degrees    R Axis 48 degrees    T Axis 45 degrees       RADIOLOGY:  Interpreted by Radiologist.  No orders to display       EKG: This EKG is signed and interpreted by the EP. Time: 1128  Rate: 70  Rhythm: Sinus  Interpretation: non-specific EKG  Comparison: None      ------------------------- NURSING NOTES AND VITALS REVIEWED ---------------------------   The nursing notes within the ED encounter and vital signs as below have been reviewed by myself. /64   Pulse 69   Temp 98.4 °F (36.9 °C)   Resp 18   Ht 5' 3\" (1.6 m)   Wt 170 lb (77.1 kg)   SpO2 98%   BMI 30.11 kg/m²   Oxygen Saturation Interpretation: Normal    The patients available past medical records and past encounters were reviewed.         ------------------------------ ED COURSE/MEDICAL DECISION MAKING----------------------  Medications   0.9 % sodium chloride bolus (0 mLs Intravenous Stopped 10/7/20 0673)         ED COURSE:       Medical Decision Making:    Cr 1.5 here, baseline 1.3, no emergent issues to admit, fu closely with pcp for further care      This patient's ED course included: a personal history and physicial examination    This patient has remained hemodynamically stable during their ED course. Re-Evaluations:             Re-evaluation. Patients symptoms are improving    Re-examination  10/7/20   11:12 AM EDT          Vital Signs:   Vitals:    10/07/20 1053 10/07/20 1115 10/07/20 1215 10/07/20 1245   BP: 125/65 127/74 134/60 129/64   Pulse:  74 69 69   Resp: 16   18   Temp:    98.4 °F (36.9 °C)   SpO2: 98%   98%   Weight: 170 lb (77.1 kg)      Height: 5' 3\" (1.6 m)        Card/Pulm:  Rhythm: normal rate. Heart Sounds: no murmurs, gallops, or rubs. clear to auscultation, no wheezes or rales and unlabored breathing. Capillary Refill: normal.  Radial Pulse:  equal.  Skin:  Warm. Consultations:                 Critical Care:         Counseling: The emergency provider has spoken with the patient and discussed todays results, in addition to providing specific details for the plan of care and counseling regarding the diagnosis and prognosis. Questions are answered at this time and they are agreeable with the plan.       --------------------------------- IMPRESSION AND DISPOSITION ---------------------------------    IMPRESSION  1. Dizziness        DISPOSITION  Disposition: Discharge to home  Patient condition is stable    NOTE: This report was transcribed using voice recognition software.  Every effort was made to ensure accuracy; however, inadvertent computerized transcription errors may be present        Katherine Valles MD  10/07/20 0727

## 2020-10-10 ENCOUNTER — OFFICE VISIT (OUTPATIENT)
Dept: PRIMARY CARE CLINIC | Age: 60
End: 2020-10-10
Payer: COMMERCIAL

## 2020-10-10 LAB
CHP ED QC CHECK: ABNORMAL
GLUCOSE BLD-MCNC: 119 MG/DL

## 2020-10-10 PROCEDURE — 82962 GLUCOSE BLOOD TEST: CPT | Performed by: FAMILY MEDICINE

## 2020-10-10 PROCEDURE — 99214 OFFICE O/P EST MOD 30 MIN: CPT | Performed by: FAMILY MEDICINE

## 2020-10-10 RX ORDER — ERGOCALCIFEROL 1.25 MG/1
50000 CAPSULE ORAL WEEKLY
Qty: 12 CAPSULE | Refills: 1 | Status: SHIPPED
Start: 2020-10-10 | End: 2021-02-25 | Stop reason: SDUPTHER

## 2020-10-10 NOTE — PROGRESS NOTES
10/10/20  Name: John Hernandez    : 1960    Sex: female    Age: 63 y.o. Subjective:  Chief Complaint: Patient is here for 4-day checkup regarding diabetes dehydration. Laboratory studies    Do show a low vitamin D. She is checking her sugars at home she forgot to list this in the room 100 235. This morning there is 96 without 119 she does not eat breakfast at today    Has not seen urology for some time we will plan on that but she refuses right now. She will consider next visit. She has cystoscopy going back to work on Monday I reduce her glipizide to just half every morning. Review of Systems   Constitutional: Negative. HENT: Negative. Eyes: Negative. Respiratory: Negative. Cardiovascular: Negative. Gastrointestinal: Negative. Endocrine: Negative. Genitourinary: Negative. Musculoskeletal: Negative. Skin: Negative. Allergic/Immunologic: Negative. Neurological: Negative. Hematological: Negative. Psychiatric/Behavioral: Negative.           Current Outpatient Medications:     vitamin D (ERGOCALCIFEROL) 1.25 MG (93656 UT) CAPS capsule, Take 1 capsule by mouth once a week, Disp: 12 capsule, Rfl: 1    glipiZIDE (GLUCOTROL) 5 MG tablet, One half tab before breakfast and supper, Disp: 30 tablet, Rfl: 1    glipiZIDE (GLUCOTROL) 5 MG tablet, Take 5 mg by mouth 2 times daily (before meals), Disp: , Rfl:   Allergies   Allergen Reactions    Codeine     Pyridium [Phenazopyridine Hcl]      Social History     Socioeconomic History    Marital status:      Spouse name: Not on file    Number of children: Not on file    Years of education: Not on file    Highest education level: Not on file   Occupational History    Not on file   Social Needs    Financial resource strain: Not on file    Food insecurity     Worry: Not on file     Inability: Not on file    Transportation needs     Medical: Not on file     Non-medical: Not on file   Tobacco Use    Smoking status: Current Every Day Smoker     Packs/day: 0.50     Types: Cigarettes    Smokeless tobacco: Never Used   Substance and Sexual Activity    Alcohol use: No    Drug use: No    Sexual activity: Not on file   Lifestyle    Physical activity     Days per week: Not on file     Minutes per session: Not on file    Stress: Not on file   Relationships    Social connections     Talks on phone: Not on file     Gets together: Not on file     Attends Muslim service: Not on file     Active member of club or organization: Not on file     Attends meetings of clubs or organizations: Not on file     Relationship status: Not on file    Intimate partner violence     Fear of current or ex partner: Not on file     Emotionally abused: Not on file     Physically abused: Not on file     Forced sexual activity: Not on file   Other Topics Concern    Not on file   Social History Narrative        DIABETES--DX 1-09    SMOKER    WEIGHT    OA    CHRONIC LOW BACK PAIN    BULGE LUMBAR DISC----WC WITH DR GARY    ELEV TSH IN PAST    LIPID    AFTERNOON SHIFT [employer] M[employer]ODUCTS    SEVERE NON COMPLIANCE    OBESITY    BRO  2013 AGE 47 MI--SMOKER---OBESE    ELEV WBC 12-14    ELEV CREA 12-14    PROTEINURIA    UTI 1-16    [employer] MOLDED CO    ER WITH URINE RETENTION 6-16-----FLEY--UROL    TOTAL L;EFT NEPHRECTOMY ---RENAL CELL CARCINOMA----- 9-16 DR OLIVA----TWO    MASS LEFT KIDNEY----CYST ON RIGHT KIDNEY      Past Medical History:   Diagnosis Date    Diabetes mellitus ([facility] [address]     Hyperlipidemia     UTI (urinary tract infection)      No family history on file. Past Surgical History:   Procedure Laterality Date    TOTAL NEPHRECTOMY Left 2016      Vitals:    10/10/20 1109   BP: 128/75   Resp: 14   Temp: 97.6 °F (36.4 °C)   TempSrc: Temporal   Weight: 170 lb (77.1 kg)  Comment: pt reported       Objective:    Physical Exam  Vitals signs reviewed. Constitutional:       Appearance: She is well-developed.    HENT:      Head: Normocephalic. Eyes:      Pupils: Pupils are equal, round, and reactive to light. Neck:      Musculoskeletal: Normal range of motion. Cardiovascular:      Rate and Rhythm: Normal rate and regular rhythm. Pulmonary:      Effort: Pulmonary effort is normal.      Breath sounds: Normal breath sounds. Abdominal:      Palpations: Abdomen is soft. Musculoskeletal: Normal range of motion. Skin:     General: Skin is warm. Neurological:      Mental Status: She is alert and oriented to person, place, and time. Psychiatric:         Behavior: Behavior normal.         Maru Bergeron was seen today for diabetes. Diagnoses and all orders for this visit:    Type 2 diabetes mellitus without complication, without long-term current use of insulin (HCC)  -     POCT Glucose    Vitamin D deficiency  -     vitamin D (ERGOCALCIFEROL) 1.25 MG (90462 UT) CAPS capsule; Take 1 capsule by mouth once a week    Mixed hyperlipidemia    Malignant neoplasm of left kidney (HCC)        Comments: We will see back in 1 month and plan for lab work and consider referral to urology if she is willing to do so. Add a vitamin D. Check blood sugars 4 times a day. Low-fat low sugar diet. Avoid skipping meals. Increase fluids. A great deal of time spent reviewing medications, diet, exercise, social issues. Also reviewing the chart before entering the room with patient and finishing charting after leaving patient's room. More than half of that time was spent face to face with the patient in counseling and coordinating care. Assistant go back to work Monday although advised to stay off longer. Follow Up: Return in about 1 month (around 11/10/2020) for give note stating to wear msk below nose when her breathing is labored, With BS.      Seen by:  Arvin Doan DO I personally performed the service described in the documentation recorded by the scribe in my presence, and it accurately and completely records my words and actions.

## 2020-10-10 NOTE — LETTER
[address] [place] [address]  Phone: [phone]  Fax: 53 Abdulaziz Logan DO        October 10, 2020      To whom it may concern:      Ms. Deanna Dudley was seen in my office 10/10/2020. It is in my medical opinion that she is to wear her mask below her nose when her breathing is labored. If nay questions please do not hesitate to call.        Sincerely,        Samra Hutchinson, DO

## 2020-10-11 VITALS
DIASTOLIC BLOOD PRESSURE: 75 MMHG | TEMPERATURE: 97.6 F | SYSTOLIC BLOOD PRESSURE: 128 MMHG | RESPIRATION RATE: 14 BRPM | WEIGHT: 170 LBS | BODY MASS INDEX: 30.11 KG/M2

## 2020-10-11 PROBLEM — E78.2 MIXED HYPERLIPIDEMIA: Status: ACTIVE | Noted: 2020-10-11

## 2020-10-11 PROBLEM — E55.9 VITAMIN D DEFICIENCY: Status: ACTIVE | Noted: 2020-10-11

## 2020-10-11 PROBLEM — R55 VASOVAGAL SYNCOPE: Chronic | Status: ACTIVE | Noted: 2020-10-06

## 2020-10-11 PROBLEM — E11.9 TYPE 2 DIABETES MELLITUS WITHOUT COMPLICATION, WITHOUT LONG-TERM CURRENT USE OF INSULIN (HCC): Chronic | Status: ACTIVE | Noted: 2020-10-06

## 2020-10-11 ASSESSMENT — ENCOUNTER SYMPTOMS
GASTROINTESTINAL NEGATIVE: 1
EYES NEGATIVE: 1
ALLERGIC/IMMUNOLOGIC NEGATIVE: 1
RESPIRATORY NEGATIVE: 1

## 2020-11-05 PROBLEM — E86.0 DEHYDRATION: Status: RESOLVED | Noted: 2020-10-06 | Resolved: 2020-11-05

## 2020-11-25 ENCOUNTER — OFFICE VISIT (OUTPATIENT)
Dept: PRIMARY CARE CLINIC | Age: 60
End: 2020-11-25
Payer: COMMERCIAL

## 2020-11-25 VITALS
BODY MASS INDEX: 30.47 KG/M2 | DIASTOLIC BLOOD PRESSURE: 74 MMHG | SYSTOLIC BLOOD PRESSURE: 128 MMHG | WEIGHT: 172 LBS | TEMPERATURE: 97.9 F

## 2020-11-25 DIAGNOSIS — E78.2 MIXED HYPERLIPIDEMIA: ICD-10-CM

## 2020-11-25 DIAGNOSIS — E11.9 TYPE 2 DIABETES MELLITUS WITHOUT COMPLICATION, WITHOUT LONG-TERM CURRENT USE OF INSULIN (HCC): Chronic | ICD-10-CM

## 2020-11-25 DIAGNOSIS — C64.2 MALIGNANT NEOPLASM OF LEFT KIDNEY (HCC): Chronic | ICD-10-CM

## 2020-11-25 LAB
ALBUMIN SERPL-MCNC: 4.2 G/DL (ref 3.5–5.2)
ALP BLD-CCNC: 85 U/L (ref 35–104)
ALT SERPL-CCNC: 7 U/L (ref 0–32)
ANION GAP SERPL CALCULATED.3IONS-SCNC: 16 MMOL/L (ref 7–16)
AST SERPL-CCNC: 16 U/L (ref 0–31)
BACTERIA: ABNORMAL /HPF
BASOPHILS ABSOLUTE: 0.05 E9/L (ref 0–0.2)
BASOPHILS RELATIVE PERCENT: 0.5 % (ref 0–2)
BILIRUB SERPL-MCNC: 0.3 MG/DL (ref 0–1.2)
BILIRUBIN URINE: NEGATIVE
BLOOD, URINE: NEGATIVE
BUN BLDV-MCNC: 22 MG/DL (ref 8–23)
CALCIUM SERPL-MCNC: 9.9 MG/DL (ref 8.6–10.2)
CHLORIDE BLD-SCNC: 103 MMOL/L (ref 98–107)
CLARITY: CLEAR
CO2: 20 MMOL/L (ref 22–29)
COLOR: YELLOW
CREAT SERPL-MCNC: 1 MG/DL (ref 0.5–1)
EOSINOPHILS ABSOLUTE: 0.25 E9/L (ref 0.05–0.5)
EOSINOPHILS RELATIVE PERCENT: 2.3 % (ref 0–6)
EPITHELIAL CELLS, UA: ABNORMAL /HPF
GFR AFRICAN AMERICAN: >60
GFR NON-AFRICAN AMERICAN: 56 ML/MIN/1.73
GLUCOSE BLD-MCNC: 102 MG/DL (ref 74–99)
GLUCOSE URINE: NEGATIVE MG/DL
HCT VFR BLD CALC: 39.1 % (ref 34–48)
HEMOGLOBIN: 13.1 G/DL (ref 11.5–15.5)
IMMATURE GRANULOCYTES #: 0.02 E9/L
IMMATURE GRANULOCYTES %: 0.2 % (ref 0–5)
KETONES, URINE: NEGATIVE MG/DL
LEUKOCYTE ESTERASE, URINE: ABNORMAL
LYMPHOCYTES ABSOLUTE: 3.06 E9/L (ref 1.5–4)
LYMPHOCYTES RELATIVE PERCENT: 27.7 % (ref 20–42)
MCH RBC QN AUTO: 32 PG (ref 26–35)
MCHC RBC AUTO-ENTMCNC: 33.5 % (ref 32–34.5)
MCV RBC AUTO: 95.6 FL (ref 80–99.9)
MICROALBUMIN UR-MCNC: 115.4 MG/L
MONOCYTES ABSOLUTE: 0.6 E9/L (ref 0.1–0.95)
MONOCYTES RELATIVE PERCENT: 5.4 % (ref 2–12)
NEUTROPHILS ABSOLUTE: 7.06 E9/L (ref 1.8–7.3)
NEUTROPHILS RELATIVE PERCENT: 63.9 % (ref 43–80)
NITRITE, URINE: NEGATIVE
PDW BLD-RTO: 13.1 FL (ref 11.5–15)
PH UA: 5.5 (ref 5–9)
PLATELET # BLD: 401 E9/L (ref 130–450)
PMV BLD AUTO: 9.9 FL (ref 7–12)
POTASSIUM SERPL-SCNC: 4.7 MMOL/L (ref 3.5–5)
PROTEIN UA: ABNORMAL MG/DL
RBC # BLD: 4.09 E12/L (ref 3.5–5.5)
RBC UA: ABNORMAL /HPF (ref 0–2)
SODIUM BLD-SCNC: 139 MMOL/L (ref 132–146)
SPECIFIC GRAVITY UA: 1.02 (ref 1–1.03)
TOTAL PROTEIN: 7.5 G/DL (ref 6.4–8.3)
UROBILINOGEN, URINE: 0.2 E.U./DL
WBC # BLD: 11 E9/L (ref 4.5–11.5)
WBC UA: ABNORMAL /HPF (ref 0–5)

## 2020-11-25 PROCEDURE — 99214 OFFICE O/P EST MOD 30 MIN: CPT | Performed by: FAMILY MEDICINE

## 2020-11-25 ASSESSMENT — ENCOUNTER SYMPTOMS
ALLERGIC/IMMUNOLOGIC NEGATIVE: 1
RESPIRATORY NEGATIVE: 1
GASTROINTESTINAL NEGATIVE: 1
EYES NEGATIVE: 1

## 2020-11-25 ASSESSMENT — PATIENT HEALTH QUESTIONNAIRE - PHQ9
2. FEELING DOWN, DEPRESSED OR HOPELESS: 0
SUM OF ALL RESPONSES TO PHQ QUESTIONS 1-9: 0
SUM OF ALL RESPONSES TO PHQ QUESTIONS 1-9: 0
SUM OF ALL RESPONSES TO PHQ9 QUESTIONS 1 & 2: 0
SUM OF ALL RESPONSES TO PHQ QUESTIONS 1-9: 0
1. LITTLE INTEREST OR PLEASURE IN DOING THINGS: 0

## 2020-11-25 NOTE — PROGRESS NOTES
20  Name: Mohit Teran    : 1960    Sex: female    Age: 63 y.o. Subjective:  Chief Complaint: Patient is here for check regarding diabetes. No bs log   She rarely   Ck bs   she says  Am around 150  No cp or sob  Feels better  workign every day         I  Again advised urology eval and pt refuses    I advised us kidney and she refuses  Will ck lab today  Wt up 2 lbs      Review of Systems   Constitutional: Negative. HENT: Negative. Eyes: Negative. Respiratory: Negative. Cardiovascular: Negative. Gastrointestinal: Negative. Endocrine: Negative. Genitourinary: Negative. Musculoskeletal: Negative. Skin: Negative. Allergic/Immunologic: Negative. Neurological: Negative. Hematological: Negative. Psychiatric/Behavioral: Negative.           Current Outpatient Medications:     vitamin D (ERGOCALCIFEROL) 1.25 MG (42860 UT) CAPS capsule, Take 1 capsule by mouth once a week, Disp: 12 capsule, Rfl: 1    glipiZIDE (GLUCOTROL) 5 MG tablet, One half tab before breakfast and supper, Disp: 30 tablet, Rfl: 1    glipiZIDE (GLUCOTROL) 5 MG tablet, Take 5 mg by mouth 2 times daily (before meals), Disp: , Rfl:   Allergies   Allergen Reactions    Codeine     Pyridium [Phenazopyridine Hcl]      Social History     Socioeconomic History    Marital status:      Spouse name: Not on file    Number of children: Not on file    Years of education: Not on file    Highest education level: Not on file   Occupational History    Not on file   Social Needs    Financial resource strain: Not on file    Food insecurity     Worry: Not on file     Inability: Not on file   [employer] needs     Medical: Not on file     Non-medical: Not on file   Tobacco Use    Smoking status: Current Every Day Smoker     Packs/day: 0.50     Types: Cigarettes    Smokeless tobacco: Never Used   Substance and Sexual Activity    Alcohol use: No    Drug use: No    Sexual activity: Not on file   Lifestyle    Physical activity     Days per week: Not on file     Minutes per session: Not on file    Stress: Not on file   Relationships    Social connections     Talks on phone: Not on file     Gets together: Not on file     Attends Jain service: Not on file     Active member of club or organization: Not on file     Attends meetings of clubs or organizations: Not on file     Relationship status: Not on file    Intimate partner violence     Fear of current or ex partner: Not on file     Emotionally abused: Not on file     Physically abused: Not on file     Forced sexual activity: Not on file   Other Topics Concern    Not on file   Social History Narrative        DIABETES--DX 1-09    SMOKER    WEIGHT    OA    CHRONIC LOW BACK PAIN    BULGE LUMBAR DISC----WC WITH DR GARY    ELEV TSH IN PAST    LIPID    AFTERNOON SHIFT [employer]    SEVERE NON COMPLIANCE    OBESITY    BRO  2013 AGE 47 MI--SMOKER---OBESE    ELEV WBC 12-14    ELEV CREA 12-14    PROTEINURIA    UTI -16    [employer] M[employer] CO    ER WITH URINE RETENTION 6-16-----FLEY--UROL    TOTAL L;EFT NEPHRECTOMY ---RENAL CELL CARCINOMA-----  DR OLIVA----TWO    MASS LEFT KIDNEY----CYST ON RIGHT KIDNEY      Past Medical History:   Diagnosis Date    Diabetes mellitus ([address]     Hyperlipidemia     UTI (urinary tract infection)      No family history on file. Past Surgical History:   Procedure Laterality Date    TOTAL NEPHRECTOMY Left 2016      Vitals:    20 1247   BP: 128/74   Temp: 97.9 °F (36.6 °C)   Weight: 172 lb (78 kg)       Objective:    Physical Exam  Vitals signs reviewed. Constitutional:       Appearance: She is well-developed. HENT:      Head: Normocephalic. Eyes:      Pupils: Pupils are equal, round, and reactive to light. Neck:      Musculoskeletal: Normal range of motion. Cardiovascular:      Rate and Rhythm: Normal rate and regular rhythm.    Pulmonary:      Effort: Pulmonary effort is normal. Breath sounds: Normal breath sounds. Abdominal:      Palpations: Abdomen is soft. Musculoskeletal: Normal range of motion. Skin:     General: Skin is warm. Neurological:      Mental Status: She is alert and oriented to person, place, and time. Psychiatric:         Behavior: Behavior normal.         Dar Sanchez was seen today for diabetes. Diagnoses and all orders for this visit:    Type 2 diabetes mellitus without complication, without long-term current use of insulin (HCC)  -     CBC Auto Differential; Future  -     Comprehensive Metabolic Panel; Future  -     Urinalysis; Future  -     Microalbumin, Ur; Future    Malignant neoplasm of left kidney (HCC)  -     CBC Auto Differential; Future  -     Comprehensive Metabolic Panel; Future  -     Urinalysis; Future  -     Microalbumin, Ur; Future    Mixed hyperlipidemia  -     CBC Auto Differential; Future  -     Comprehensive Metabolic Panel; Future  -     Urinalysis; Future  -     Microalbumin, Ur; Future    Vitamin D deficiency        Comments: low fat and sugar diet   exer  Pt reufses     uc or ct of kdiney and refuses urol    She  Aware of risk    s he does not want to know anything  A great deal of time spent reviewing medications, diet, exercise, social issues. Also reviewing the chart before entering the room with patient and finishing charting after leaving patient's room. More than half of that time was spent face to face with the patient in counseling and coordinating care. Ck bp  dialy at home    No  nsiads    Follow Up: Return in about 3 months (around 2/25/2021) for Lab Before.      Seen by:  Tenzin Gaspar DO

## 2020-11-27 ENCOUNTER — TELEPHONE (OUTPATIENT)
Dept: PRIMARY CARE CLINIC | Age: 60
End: 2020-11-27

## 2020-12-14 RX ORDER — GLIPIZIDE 5 MG/1
5 TABLET ORAL
Qty: 60 TABLET | Refills: 5 | Status: SHIPPED
Start: 2020-12-14 | End: 2021-02-25 | Stop reason: SDUPTHER

## 2020-12-28 ENCOUNTER — OFFICE VISIT (OUTPATIENT)
Dept: FAMILY MEDICINE CLINIC | Age: 60
End: 2020-12-28
Payer: COMMERCIAL

## 2020-12-28 PROCEDURE — 90715 TDAP VACCINE 7 YRS/> IM: CPT | Performed by: FAMILY MEDICINE

## 2020-12-28 PROCEDURE — 90471 IMMUNIZATION ADMIN: CPT | Performed by: FAMILY MEDICINE

## 2020-12-28 PROCEDURE — 99213 OFFICE O/P EST LOW 20 MIN: CPT | Performed by: FAMILY MEDICINE

## 2020-12-28 RX ORDER — CEPHALEXIN 500 MG/1
500 CAPSULE ORAL 3 TIMES DAILY
Qty: 21 CAPSULE | Refills: 0 | Status: SHIPPED
Start: 2020-12-28 | End: 2021-02-25

## 2020-12-28 NOTE — PROGRESS NOTES
20  Name: Patrice Gabriel    : 1960    Sex: female    Age: 63 y.o. Subjective:  Chief Complaint: Patient is here for left first toe nail      Patient walked in with complaints of left first toe discomfort after cutting her toenails. Onset 2 days ago with no temperature chills. She was seen podiatry but failed follow-up. Review of Systems   Respiratory: Negative.     Skin:        See HPI         Current Outpatient Medications:     cephALEXin (KEFLEX) 500 MG capsule, Take 1 capsule by mouth 3 times daily, Disp: 21 capsule, Rfl: 0    glipiZIDE (GLUCOTROL) 5 MG tablet, Take 1 tablet by mouth 2 times daily (before meals), Disp: 60 tablet, Rfl: 5    vitamin D (ERGOCALCIFEROL) 1.25 MG (64826 UT) CAPS capsule, Take 1 capsule by mouth once a week, Disp: 12 capsule, Rfl: 1    glipiZIDE (GLUCOTROL) 5 MG tablet, One half tab before breakfast and supper, Disp: 30 tablet, Rfl: 1  Allergies   Allergen Reactions    Codeine     Pyridium [Phenazopyridine Hcl]      Social History     Socioeconomic History    Marital status:      Spouse name: Not on file    Number of children: Not on file    Years of education: Not on file    Highest education level: Not on file   Occupational History    Not on file   Social Needs    Financial resource strain: Not on file    Food insecurity     Worry: Not on file     Inability: Not on file    Transportation needs     Medical: Not on file     Non-medical: Not on file   Tobacco Use    Smoking status: Current Every Day Smoker     Packs/day: 0.50     Types: Cigarettes    Smokeless tobacco: Never Used   Substance and Sexual Activity    Alcohol use: No    Drug use: No    Sexual activity: Not on file   Lifestyle    Physical activity     Days per week: Not on file     Minutes per session: Not on file    Stress: Not on file   Relationships    Social connections     Talks on phone: Not on file     Gets together: Not on file     Attends Mandaen service: Not on file     Active member of club or organization: Not on file     Attends meetings of clubs or organizations: Not on file     Relationship status: Not on file    Intimate partner violence     Fear of current or ex partner: Not on file     Emotionally abused: Not on file     Physically abused: Not on file     Forced sexual activity: Not on file   Other Topics Concern    Not on file   Social History Narrative        DIABETES--DX 1-09    SMOKER    WEIGHT    OA    CHRONIC LOW BACK PAIN    BULGE LUMBAR DISC----WC WITH DR GARY    ELEV TSH IN PAST    LIPID    AFTERNOON SHIFT [employer]    SEVERE NON COMPLIANCE    OBESITY    BRO  2013 AGE 47 MI--SMOKER---OBESE    ELEV WBC 12-14    ELEV CREA 12-14    PROTEINURIA    UTI -16    [employer] MOLDED CO    ER WITH URINE RETENTION -16-----FLEY--UROL    TOTAL L;EFT NEPHRECTOMY ---RENAL CELL CARCINOMA-----  DR OLIVA----TWO    MASS LEFT KIDNEY----CYST ON RIGHT KIDNEY      Past Medical History:   Diagnosis Date    Diabetes mellitus ([facility] [address]     Hyperlipidemia     UTI (urinary tract infection)      No family history on file. Past Surgical History:   Procedure Laterality Date    TOTAL NEPHRECTOMY Left 2016      Vitals:    20 1147   BP: 134/82   Resp: 16   Temp: 97 °F (36.1 °C)   TempSrc: Temporal   Weight: 171 lb (77.6 kg)       Objective:    Physical Exam  Cardiovascular:      Rate and Rhythm: Normal rate and regular rhythm. Pulses: Normal pulses. Heart sounds: Normal heart sounds. Skin:     Comments: Very mild erythema with tenderness to palpation over medial aspect of the left first toenail with bilateral edges of the nail dug into the skin. Varsha was seen today for toe injury. Diagnoses and all orders for this visit:    Cellulitis of toe of left foot  -     Tdap (age 6y and older) IM (BOOSTRIX)  -     cephALEXin (KEFLEX) 500 MG capsule;  Take 1 capsule by mouth 3 times daily    Type 2 diabetes mellitus without complication, without long-term current use of insulin ([facility] [address]        Comments: She will call for appoint with Dr. Baron Mayberry see him the next few days. See me back in 2 days. Of any chills or temperature or red streaks increasing go to ER. She has no abscess formation now with that develops notify. Will start her on Keflex and give her a tetanus shot. Check blood sugars twice a day. She states they have been around 130 at home. A great deal of time spent reviewing medications, diet, exercise, social issues. Also reviewing the chart before entering the room with patient and finishing charting after leaving patient's room. More than half of that time was spent face to face with the patient in counseling and coordinating care. Follow Up: Return in about 2 days (around 12/30/2020) for With BS.      Seen by:  Darlyn Koch,

## 2020-12-29 VITALS
WEIGHT: 171 LBS | TEMPERATURE: 97 F | DIASTOLIC BLOOD PRESSURE: 82 MMHG | BODY MASS INDEX: 30.29 KG/M2 | SYSTOLIC BLOOD PRESSURE: 134 MMHG | RESPIRATION RATE: 16 BRPM

## 2020-12-29 ASSESSMENT — PATIENT HEALTH QUESTIONNAIRE - PHQ9
SUM OF ALL RESPONSES TO PHQ9 QUESTIONS 1 & 2: 0
SUM OF ALL RESPONSES TO PHQ QUESTIONS 1-9: 0
1. LITTLE INTEREST OR PLEASURE IN DOING THINGS: 0
SUM OF ALL RESPONSES TO PHQ QUESTIONS 1-9: 0
2. FEELING DOWN, DEPRESSED OR HOPELESS: 0
SUM OF ALL RESPONSES TO PHQ QUESTIONS 1-9: 0

## 2020-12-29 ASSESSMENT — ENCOUNTER SYMPTOMS
ROS SKIN COMMENTS: SEE HPI
RESPIRATORY NEGATIVE: 1

## 2020-12-31 ENCOUNTER — OFFICE VISIT (OUTPATIENT)
Dept: PRIMARY CARE CLINIC | Age: 60
End: 2020-12-31
Payer: COMMERCIAL

## 2020-12-31 VITALS — BODY MASS INDEX: 30.29 KG/M2 | TEMPERATURE: 98.3 F | RESPIRATION RATE: 14 BRPM | WEIGHT: 171 LBS

## 2020-12-31 PROCEDURE — 99213 OFFICE O/P EST LOW 20 MIN: CPT | Performed by: FAMILY MEDICINE

## 2020-12-31 NOTE — PROGRESS NOTES
20  Name: Sofiya Barrios    : 1960    Sex: female    Age: 63 y.o. Subjective:  Chief Complaint: Patient is here for left first toe re ck     feelstons better    bs ok      Review of Systems   Respiratory: Negative. Cardiovascular: Negative.     Skin:        See  hpi         Current Outpatient Medications:     cephALEXin (KEFLEX) 500 MG capsule, Take 1 capsule by mouth 3 times daily, Disp: 21 capsule, Rfl: 0    glipiZIDE (GLUCOTROL) 5 MG tablet, Take 1 tablet by mouth 2 times daily (before meals), Disp: 60 tablet, Rfl: 5    vitamin D (ERGOCALCIFEROL) 1.25 MG (15852 UT) CAPS capsule, Take 1 capsule by mouth once a week, Disp: 12 capsule, Rfl: 1    glipiZIDE (GLUCOTROL) 5 MG tablet, One half tab before breakfast and supper, Disp: 30 tablet, Rfl: 1  Allergies   Allergen Reactions    Codeine     Pyridium [Phenazopyridine Hcl]      Social History     Socioeconomic History    Marital status:      Spouse name: Not on file    Number of children: Not on file    Years of education: Not on file    Highest education level: Not on file   Occupational History    Not on file   Social Needs    Financial resource strain: Not on file    Food insecurity     Worry: Not on file     Inability: Not on file    Transportation needs     Medical: Not on file     Non-medical: Not on file   Tobacco Use    Smoking status: Current Every Day Smoker     Packs/day: 0.50     Types: Cigarettes    Smokeless tobacco: Never Used   Substance and Sexual Activity    Alcohol use: No    Drug use: No    Sexual activity: Not on file   Lifestyle    Physical activity     Days per week: Not on file     Minutes per session: Not on file    Stress: Not on file   Relationships    Social connections     Talks on phone: Not on file     Gets together: Not on file     Attends Jew service: Not on file     Active member of club or organization: Not on file     Attends meetings of clubs or organizations: Not on file Appt.     Seen by:  Deisi Dumont, DO

## 2021-02-04 ENCOUNTER — PROCEDURE VISIT (OUTPATIENT)
Dept: PODIATRY | Age: 61
End: 2021-02-04
Payer: COMMERCIAL

## 2021-02-04 VITALS
DIASTOLIC BLOOD PRESSURE: 78 MMHG | BODY MASS INDEX: 30.29 KG/M2 | TEMPERATURE: 98.2 F | WEIGHT: 171 LBS | SYSTOLIC BLOOD PRESSURE: 122 MMHG

## 2021-02-04 DIAGNOSIS — R26.2 DIFFICULTY WALKING: ICD-10-CM

## 2021-02-04 DIAGNOSIS — E11.65 TYPE 2 DIABETES MELLITUS WITH HYPERGLYCEMIA, WITH LONG-TERM CURRENT USE OF INSULIN (HCC): ICD-10-CM

## 2021-02-04 DIAGNOSIS — M79.674 PAIN IN TOE OF RIGHT FOOT: ICD-10-CM

## 2021-02-04 DIAGNOSIS — E11.42 DIABETIC POLYNEUROPATHY ASSOCIATED WITH TYPE 2 DIABETES MELLITUS (HCC): ICD-10-CM

## 2021-02-04 DIAGNOSIS — I73.9 PERIPHERAL VASCULAR DISEASE, UNSPECIFIED (HCC): ICD-10-CM

## 2021-02-04 DIAGNOSIS — M79.675 PAIN IN LEFT TOE(S): ICD-10-CM

## 2021-02-04 DIAGNOSIS — B35.1 TINEA UNGUIUM: Primary | ICD-10-CM

## 2021-02-04 DIAGNOSIS — Z79.4 TYPE 2 DIABETES MELLITUS WITH HYPERGLYCEMIA, WITH LONG-TERM CURRENT USE OF INSULIN (HCC): ICD-10-CM

## 2021-02-04 PROCEDURE — 11721 DEBRIDE NAIL 6 OR MORE: CPT | Performed by: PODIATRIST

## 2021-02-04 RX ORDER — IBUPROFEN 800 MG/1
800 TABLET ORAL 2 TIMES DAILY PRN
Qty: 180 TABLET | Refills: 1 | Status: SHIPPED
Start: 2021-02-04 | End: 2021-02-25

## 2021-02-04 NOTE — PROGRESS NOTES
[facility] PODIATRY  [address] [facility] [place] [address]  Dept: [phone]  Dept Fax: [phone]     PATIENT PROGRESS NOTE  Date of patient's visit: 2/4/2021  Patient's Name:  Radha Elias YOB: 1960            Patient Care Team:  Stephanie Mclean DO as PCP - [facility] Asher Mark DO as PCP - [facility]        Chief Complaint   Patient presents with    Diabetes     saw pcp Dr. Pebbles Duenas on 12/31/2020    Toe Pain       HPI  HPI:   Radha Elias is a 63 y.o. female who presents to the office today complaining of diabetic foot pain. This  patient is seen today for a full diabetic exam of lower extremity. Patient is unable to trim her nails that are very thick and painful. Patient relates that she has neuropathy diabetic neuropathy which has progressively gotten worse the pain is mostly at bedtime and during the night the last 4 to 5 months it has been waking her up during the day she is feeling much better. .     Allergies   Allergen Reactions    Codeine     Pyridium [Phenazopyridine Hcl]        Past Medical History:   Diagnosis Date    Diabetes mellitus ([address]     Hyperlipidemia     UTI (urinary tract infection)        Prior to Admission medications    Medication Sig Start Date End Date Taking? Authorizing Provider   cephALEXin (KEFLEX) 500 MG capsule Take 1 capsule by mouth 3 times daily 12/28/20  Yes Geoff King DO   glipiZIDE (GLUCOTROL) 5 MG tablet Take 1 tablet by mouth 2 times daily (before meals) 12/14/20  Yes Geoff King DO   vitamin D (ERGOCALCIFEROL) 1.25 MG (74409 UT) CAPS capsule Take 1 capsule by mouth once a week 10/10/20  Yes Geoff King DO   glipiZIDE (GLUCOTROL) 5 MG tablet One half tab before breakfast and supper 10/6/20  Yes Swati King DO       Past Surgical History:   Procedure Laterality Date    TOTAL NEPHRECTOMY Left 09/27/2016       No family history on file. Social History     Tobacco Use    Smoking status: Current Every Day Smoker     Packs/day: 0.50     Types: Cigarettes    Smokeless tobacco: Never Used   Substance Use Topics    Alcohol use: No       Review of Systems    Review of Systems:   History obtained from chart review and the patient  General ROS: negative for - chills, fatigue, fever, night sweats or weight gain  Constitutional: Negative for chills, diaphoresis, fatigue, fever and unexpected weight change. Musculoskeletal: Positive for hallux abductovalgus. Pain bilaterally. .  Neurological ROS: negative for - behavioral changes, confusion, headaches or seizures. Negative for weakness and numbness. Dermatological ROS: negative for - mole changes, rash  Cardiovascular: Negative for leg swelling. Gastrointestinal: Negative for constipation, diarrhea, nausea and vomiting.                Lower Extremity Physical Examination:   Vitals:   Vitals:    02/04/21 1604   BP: 122/78   Temp: 98.2 °F (36.8 °C)   Toenail Description  Sites of Onychomycosis Involvement (Check affected area)  [x] [x] [x] [x] [x] [x] [x] [x] [x] [x]  5 4 3 2 1 1 2 3 4 5                          Right                                        Left    Thickness  [x] [x] [x] [x] [x] [x] [x] [x] [x] [x]  5 4 3 2 1 1 2 3 4 5                         Right                                        Left    Dystrophic Changes   [x] [x] [x] [x] [x] [x] [x] [x] [x] [x]  5 4 3 2 1 1 2 3 4 5                         Right                                        Left    discolored   [x] [x] [x] [x] [x] [x] [x] [x] [x] [x]  5 4 3 2 1 1 2 3 4 5                          Right                                        Left    Incurvation/Ingrowin   [] [] [] [] [x] [x] [] [] [] []  5 4 3 2 1 1 2 3 4 5                         Right                                        Left    Inflammation/Pain   [x] [x] [x] [x] [x] [x] [x] [x] [x] [x]  5 4 3 2 1 1 2 3 4 5 Right                                        Left       Foot Exam    General  General Appearance: appears stated age and healthy   Orientation: alert and oriented to person, place, and time       Right Foot/Ankle     Inspection and Palpation  Tenderness: bony tenderness   Skin Exam: skin changes and abnormal color; Neurovascular  Dorsalis pedis: 1+  Posterior tibial: absent      Left Foot/Ankle      Inspection and Palpation  Tenderness: bony tenderness   Skin Exam: skin changes and abnormal color; Neurovascular  Dorsalis pedis: 1+  Posterior tibial: absent          Ortho Exam    General: AAO x 3 in NAD. Dermatologic Exam:  Skin lesion/ulceration Absent . Skin No rashes or nodules noted. .   Musculoskeletal:   Today lower extremity examination revealed decreased sharp dull sensation to the plantar aspect of bilateral feet. There was mild difficulty with toe pain and palpation to the dorsal plantar medial lateral aspect of bilateral feet. Mild bunion noted bilaterally hammertoes 2 through 4 noted bilaterally. Vascular examination of the lower extremity revealed decreased pulses and specifically the PT bilaterally digits 1 through 5 were cool to touch there was discoloration noted to the skin lower extremity including loss of hair thick nails were thick pitting mycotic. Radiographs:  3 views  foot/ankle:     Asessment: Patient is a 63 y.o. female with:    Diagnosis Orders   1. Tinea unguium     2. Diabetic polyneuropathy associated with type 2 diabetes mellitus (HCC)  HM DIABETES FOOT EXAM   3. Pain in left toe(s)     4. Peripheral vascular disease, unspecified ([address]     5. Difficulty walking     6.  Type 2 diabetes mellitus with hyperglycemia, with long-term current use of insulin ([facility])     7. Pain in toe of right foot It was discussed in detail with the patient proper hygiene for the diabetic foot. They are to get into a habit of looking at their feet or have someone look at them. If they are unable to daily, they are to look for any signs of redness, blistering, cracking, swelling, drainage, open lesions, etc.  They are to dry in-between the toes after each bath or shower gently. The water should be tested with their elbow to prevent burns. The patient is to refrain from soaking their feet unless instructed by myself to do otherwise. They are to refrain from going barefoot. Shoe gear should be inspected for any foreign objects. Shoes should have a deep, wide toe box area. With any type of shoe, the feet should be inspected for any signs of pressure, i.e., redness, blistering, or open lesions. The patient was instructed to contact myself or other health care provider with any questions.     Pt laced on tramadol at night for neuropathy pain  Pt is fit for duty at work no restrictions

## 2021-02-24 DIAGNOSIS — E78.2 MIXED HYPERLIPIDEMIA: ICD-10-CM

## 2021-02-24 DIAGNOSIS — E03.9 ACQUIRED HYPOTHYROIDISM: ICD-10-CM

## 2021-02-24 DIAGNOSIS — E55.9 VITAMIN D DEFICIENCY: ICD-10-CM

## 2021-02-24 DIAGNOSIS — E11.9 TYPE 2 DIABETES MELLITUS WITHOUT COMPLICATION, WITHOUT LONG-TERM CURRENT USE OF INSULIN (HCC): Primary | ICD-10-CM

## 2021-02-24 DIAGNOSIS — C64.2 MALIGNANT NEOPLASM OF LEFT KIDNEY (HCC): ICD-10-CM

## 2021-02-24 DIAGNOSIS — E11.9 TYPE 2 DIABETES MELLITUS WITHOUT COMPLICATION, WITHOUT LONG-TERM CURRENT USE OF INSULIN (HCC): ICD-10-CM

## 2021-02-24 LAB
ALBUMIN SERPL-MCNC: 4.1 G/DL (ref 3.5–5.2)
ALP BLD-CCNC: 102 U/L (ref 35–104)
ALT SERPL-CCNC: 8 U/L (ref 0–32)
ANION GAP SERPL CALCULATED.3IONS-SCNC: 12 MMOL/L (ref 7–16)
AST SERPL-CCNC: 12 U/L (ref 0–31)
BACTERIA: ABNORMAL /HPF
BASOPHILS ABSOLUTE: 0.05 E9/L (ref 0–0.2)
BASOPHILS RELATIVE PERCENT: 0.5 % (ref 0–2)
BILIRUB SERPL-MCNC: 0.3 MG/DL (ref 0–1.2)
BILIRUBIN URINE: NEGATIVE
BLOOD, URINE: NEGATIVE
BUN BLDV-MCNC: 26 MG/DL (ref 8–23)
CALCIUM SERPL-MCNC: 9.8 MG/DL (ref 8.6–10.2)
CHLORIDE BLD-SCNC: 106 MMOL/L (ref 98–107)
CHOLESTEROL, TOTAL: 203 MG/DL (ref 0–199)
CLARITY: CLEAR
CO2: 24 MMOL/L (ref 22–29)
COLOR: YELLOW
CREAT SERPL-MCNC: 1.2 MG/DL (ref 0.5–1)
EOSINOPHILS ABSOLUTE: 0.26 E9/L (ref 0.05–0.5)
EOSINOPHILS RELATIVE PERCENT: 2.5 % (ref 0–6)
EPITHELIAL CELLS, UA: ABNORMAL /HPF
GFR AFRICAN AMERICAN: 55
GFR NON-AFRICAN AMERICAN: 46 ML/MIN/1.73
GLUCOSE BLD-MCNC: 79 MG/DL (ref 74–99)
GLUCOSE URINE: NEGATIVE MG/DL
HBA1C MFR BLD: 6.3 % (ref 4–5.6)
HCT VFR BLD CALC: 38.4 % (ref 34–48)
HDLC SERPL-MCNC: 48 MG/DL
HEMOGLOBIN: 12.5 G/DL (ref 11.5–15.5)
IMMATURE GRANULOCYTES #: 0.03 E9/L
IMMATURE GRANULOCYTES %: 0.3 % (ref 0–5)
KETONES, URINE: NEGATIVE MG/DL
LDL CHOLESTEROL CALCULATED: 133 MG/DL (ref 0–99)
LEUKOCYTE ESTERASE, URINE: ABNORMAL
LYMPHOCYTES ABSOLUTE: 3.41 E9/L (ref 1.5–4)
LYMPHOCYTES RELATIVE PERCENT: 32.3 % (ref 20–42)
MCH RBC QN AUTO: 31.2 PG (ref 26–35)
MCHC RBC AUTO-ENTMCNC: 32.6 % (ref 32–34.5)
MCV RBC AUTO: 95.8 FL (ref 80–99.9)
MONOCYTES ABSOLUTE: 0.77 E9/L (ref 0.1–0.95)
MONOCYTES RELATIVE PERCENT: 7.3 % (ref 2–12)
NEUTROPHILS ABSOLUTE: 6.03 E9/L (ref 1.8–7.3)
NEUTROPHILS RELATIVE PERCENT: 57.1 % (ref 43–80)
NITRITE, URINE: NEGATIVE
PDW BLD-RTO: 13 FL (ref 11.5–15)
PH UA: 6 (ref 5–9)
PLATELET # BLD: 414 E9/L (ref 130–450)
PMV BLD AUTO: 10 FL (ref 7–12)
POTASSIUM SERPL-SCNC: 4.6 MMOL/L (ref 3.5–5)
PROTEIN UA: ABNORMAL MG/DL
RBC # BLD: 4.01 E12/L (ref 3.5–5.5)
RBC UA: ABNORMAL /HPF (ref 0–2)
SODIUM BLD-SCNC: 142 MMOL/L (ref 132–146)
SPECIFIC GRAVITY UA: 1.02 (ref 1–1.03)
T4 TOTAL: 7.2 MCG/DL (ref 4.5–11.7)
TOTAL PROTEIN: 7.5 G/DL (ref 6.4–8.3)
TRIGL SERPL-MCNC: 110 MG/DL (ref 0–149)
TSH SERPL DL<=0.05 MIU/L-ACNC: 2.05 UIU/ML (ref 0.27–4.2)
UROBILINOGEN, URINE: 0.2 E.U./DL
VITAMIN D 25-HYDROXY: 23 NG/ML (ref 30–100)
VLDLC SERPL CALC-MCNC: 22 MG/DL
WBC # BLD: 10.6 E9/L (ref 4.5–11.5)
WBC UA: ABNORMAL /HPF (ref 0–5)

## 2021-02-25 ENCOUNTER — OFFICE VISIT (OUTPATIENT)
Dept: PRIMARY CARE CLINIC | Age: 61
End: 2021-02-25
Payer: COMMERCIAL

## 2021-02-25 VITALS
TEMPERATURE: 98.1 F | SYSTOLIC BLOOD PRESSURE: 138 MMHG | DIASTOLIC BLOOD PRESSURE: 78 MMHG | BODY MASS INDEX: 34.01 KG/M2 | WEIGHT: 192 LBS

## 2021-02-25 DIAGNOSIS — E11.9 TYPE 2 DIABETES MELLITUS WITHOUT COMPLICATION, WITHOUT LONG-TERM CURRENT USE OF INSULIN (HCC): Primary | Chronic | ICD-10-CM

## 2021-02-25 DIAGNOSIS — C64.2 MALIGNANT NEOPLASM OF LEFT KIDNEY (HCC): Chronic | ICD-10-CM

## 2021-02-25 DIAGNOSIS — R26.9 NEUROLOGIC GAIT DYSFUNCTION: ICD-10-CM

## 2021-02-25 DIAGNOSIS — R42 LIGHT HEADEDNESS: ICD-10-CM

## 2021-02-25 DIAGNOSIS — G62.9 NEUROPATHY: ICD-10-CM

## 2021-02-25 DIAGNOSIS — E55.9 VITAMIN D DEFICIENCY: ICD-10-CM

## 2021-02-25 PROCEDURE — 99214 OFFICE O/P EST MOD 30 MIN: CPT | Performed by: FAMILY MEDICINE

## 2021-02-25 RX ORDER — ERGOCALCIFEROL 1.25 MG/1
50000 CAPSULE ORAL WEEKLY
Qty: 12 CAPSULE | Refills: 5 | Status: SHIPPED | OUTPATIENT
Start: 2021-02-25

## 2021-02-25 RX ORDER — GLIPIZIDE 5 MG/1
5 TABLET ORAL
Qty: 180 TABLET | Refills: 5 | Status: SHIPPED
Start: 2021-02-25 | End: 2022-10-27 | Stop reason: ALTCHOICE

## 2021-02-25 ASSESSMENT — PATIENT HEALTH QUESTIONNAIRE - PHQ9
SUM OF ALL RESPONSES TO PHQ QUESTIONS 1-9: 0
SUM OF ALL RESPONSES TO PHQ9 QUESTIONS 1 & 2: 0
1. LITTLE INTEREST OR PLEASURE IN DOING THINGS: 0
SUM OF ALL RESPONSES TO PHQ QUESTIONS 1-9: 0

## 2021-02-25 ASSESSMENT — ENCOUNTER SYMPTOMS
GASTROINTESTINAL NEGATIVE: 1
EYES NEGATIVE: 1
RESPIRATORY NEGATIVE: 1
ALLERGIC/IMMUNOLOGIC NEGATIVE: 1

## 2021-02-25 NOTE — PROGRESS NOTES
21  Name: Karuna Allen    : 1960    Sex: female    Age: 63 y.o. Subjective:  Chief Complaint: Patient is here for diabetes  Kidney function       3 mock and wt u  20 lbs  Diet poor     crea inc    ghb down  Chol   beter   D  Better but stil  Low  Legs tingle and some pain--  Low back painan dseing chiro  Dr Mathews Liter   She gets light heded at tiems   Legs fele numb at times  Unsteady on feet     And  Leg numb and tingle  podiatyry gave her IBF and I am telling to dc  Due inc  Creatinine    She has to use a rolling chiar  At work    When light headded      Review of Systems   Constitutional: Negative. HENT: Negative. Eyes: Negative. Respiratory: Negative. Cardiovascular: Negative. Gastrointestinal: Negative. Endocrine: Negative. Genitourinary: Negative. Musculoskeletal:        See  hpi   Skin: Negative. Allergic/Immunologic: Negative. Neurological: Positive for numbness. Hematological: Negative. Psychiatric/Behavioral: Negative.           Current Outpatient Medications:     ibuprofen (ADVIL;MOTRIN) 800 MG tablet, Take 1 tablet by mouth 2 times daily as needed for Pain, Disp: 180 tablet, Rfl: 1    cephALEXin (KEFLEX) 500 MG capsule, Take 1 capsule by mouth 3 times daily, Disp: 21 capsule, Rfl: 0    glipiZIDE (GLUCOTROL) 5 MG tablet, Take 1 tablet by mouth 2 times daily (before meals), Disp: 60 tablet, Rfl: 5    vitamin D (ERGOCALCIFEROL) 1.25 MG (43180 UT) CAPS capsule, Take 1 capsule by mouth once a week, Disp: 12 capsule, Rfl: 1    glipiZIDE (GLUCOTROL) 5 MG tablet, One half tab before breakfast and supper, Disp: 30 tablet, Rfl: 1  Allergies   Allergen Reactions    Codeine     Pyridium [Phenazopyridine Hcl]      Social History     Socioeconomic History    Marital status:      Spouse name: Not on file    Number of children: Not on file    Years of education: Not on file    Highest education level: Not on file   Occupational History    Not on file Social Needs    Financial resource strain: Not on file    Food insecurity     Worry: Not on file     Inability: Not on file   [employer] needs     Medical: Not on file     Non-medical: Not on file   Tobacco Use    Smoking status: Current Every Day Smoker     Packs/day: 0.50     Types: Cigarettes    Smokeless tobacco: Never Used   Substance and Sexual Activity    Alcohol use: No    Drug use: No    Sexual activity: Not on file   Lifestyle    Physical activity     Days per week: Not on file     Minutes per session: Not on file    Stress: Not on file   Relationships    Social connections     Talks on phone: Not on file     Gets together: Not on file     Attends Judaism service: Not on file     Active member of club or organization: Not on file     Attends meetings of clubs or organizations: Not on file     Relationship status: Not on file    Intimate partner violence     Fear of current or ex partner: Not on file     Emotionally abused: Not on file     Physically abused: Not on file     Forced sexual activity: Not on file   Other Topics Concern    Not on file   Social History Narrative        DIABETES--DX 1-09    SMOKER    WEIGHT    OA    CHRONIC LOW BACK PAIN    BULGE LUMBAR DISC----WC WITH DR GARY    ELEV TSH IN PAST    LIPID    AFTERNOON SHIFT [employer] MOLDED PRODUCTS    SEVERE NON COMPLIANCE    OBESITY    BRO  2013 AGE 47 MI--SMOKER---OBESE    ELEV WBC 12-14    ELEV CREA 12-14    PROTEINURIA    UTI 1-16    [employer] MOLDED CO    ER WITH URINE RETENTION 6-16-----FLEY--UROL    TOTAL L;EFT NEPHRECTOMY ---RENAL CELL CARCINOMA----- 9-16 DR OLIVA----TWO    MASS LEFT KIDNEY----CYST ON RIGHT KIDNEY      Past Medical History:   Diagnosis Date    [facility]     Hyperlipidemia     UTI (urinary tract infection)      No family history on file.    Past Surgical History:   Procedure Laterality Date    TOTAL NEPHRECTOMY Left 2016      Vitals:    21 1054   BP: 138/78   Temp: 98.1 °F (36.7 °C)   TempSrc: Oral   Weight: 192 lb (87.1 kg)       Objective:    Physical Exam  Vitals signs reviewed. Constitutional:       Appearance: She is well-developed. She is obese. HENT:      Head: Normocephalic. Eyes:      Pupils: Pupils are equal, round, and reactive to light. Neck:      Musculoskeletal: Normal range of motion. Cardiovascular:      Rate and Rhythm: Normal rate and regular rhythm. Pulmonary:      Effort: Pulmonary effort is normal.      Breath sounds: Normal breath sounds. Abdominal:      Palpations: Abdomen is soft. Musculoskeletal: Normal range of motion. Skin:     General: Skin is warm. Neurological:      Mental Status: She is alert and oriented to person, place, and time. Sensory: Sensory deficit (dec snesation both legs) present. Psychiatric:         Behavior: Behavior normal.         Fatmata Lizama was seen today for discuss labs. Diagnoses and all orders for this visit:    Type 2 diabetes mellitus without complication, without long-term current use of insulin [facility])    Neuropathy  -     Adina Ha MD, Neurology, Huang Monique MD, Neurology, [place]    Neurologic gait dysfunction  -     Adina Ha MD, Neurology, [place]    Malignant neoplasm of left kidney [facility])        Comments: no nsaidsd    Renal and reef   dc ibf I advised not work and she araceli noelledier    I charlineks job is makign her worse      A great deal of time spent reviewing medications, diet, exercise, social issues. Also reviewing the chart before entering the room with patient and finishing charting after leaving patient's room. More than half of that time was spent face to face with the patient in counseling and coordinating care.       bronchospasm qid     appt neuro     Follow Up: Return for See Referral.     Seen by:  Quinton Rodriguez DO

## 2021-03-11 ENCOUNTER — OFFICE VISIT (OUTPATIENT)
Dept: FAMILY MEDICINE CLINIC | Age: 61
End: 2021-03-11
Payer: COMMERCIAL

## 2021-03-11 VITALS
WEIGHT: 191 LBS | BODY MASS INDEX: 33.83 KG/M2 | SYSTOLIC BLOOD PRESSURE: 135 MMHG | HEART RATE: 82 BPM | OXYGEN SATURATION: 97 % | DIASTOLIC BLOOD PRESSURE: 78 MMHG | TEMPERATURE: 98.1 F

## 2021-03-11 DIAGNOSIS — E11.9 TYPE 2 DIABETES MELLITUS WITHOUT COMPLICATION, WITHOUT LONG-TERM CURRENT USE OF INSULIN (HCC): Chronic | ICD-10-CM

## 2021-03-11 DIAGNOSIS — R42 DIZZY: Primary | ICD-10-CM

## 2021-03-11 PROCEDURE — 99213 OFFICE O/P EST LOW 20 MIN: CPT | Performed by: FAMILY MEDICINE

## 2021-03-11 RX ORDER — MUPIROCIN CALCIUM 20 MG/G
CREAM TOPICAL
Qty: 1 TUBE | Refills: 5 | Status: SHIPPED | OUTPATIENT
Start: 2021-03-11 | End: 2021-04-10

## 2021-03-11 ASSESSMENT — ENCOUNTER SYMPTOMS
EYES NEGATIVE: 1
GASTROINTESTINAL NEGATIVE: 1
ALLERGIC/IMMUNOLOGIC NEGATIVE: 1
RESPIRATORY NEGATIVE: 1

## 2021-03-11 NOTE — PROGRESS NOTES
3/11/21  Name: Narciso Hicks    : 1960    Sex: female    Age: 63 y.o. Subjective:  Chief Complaint: Patient is here for dizzy lightheaded     She almost fell at work yest and [place] on feet   She sees  Neuro in few days  Pt feels unable to work [employer] neuro  Ask for refill nbactroban cream      Review of Systems   Constitutional: Negative. HENT: Negative. See  hpi   Eyes: Negative. Respiratory: Negative. Cardiovascular: Negative. Gastrointestinal: Negative. Endocrine: Negative. Genitourinary: Negative. Musculoskeletal: Negative. Skin: Negative. Allergic/Immunologic: Negative. Neurological: Negative. Hematological: Negative. Psychiatric/Behavioral: Negative. Current Outpatient Medications:     mupirocin (BACTROBAN) 2 % cream, Apply topically 3 times daily. , Disp: 1 Tube, Rfl: 5    vitamin D (ERGOCALCIFEROL) 1.25 MG (71782 UT) CAPS capsule, Take 1 capsule by mouth once a week, Disp: 12 capsule, Rfl: 5    glipiZIDE (GLUCOTROL) 5 MG tablet, Take 1 tablet by mouth 2 times daily (before meals), Disp: 180 tablet, Rfl: 5  Allergies   Allergen Reactions    Codeine     Pyridium [Phenazopyridine Hcl]      Social History     Socioeconomic History    Marital status:      Spouse name: Not on file    Number of children: Not on file    Years of education: Not on file    Highest education level: Not on file   Occupational History    Not on file   Social Needs    Financial resource strain: Not on file    Food insecurity     Worry: Not on file     Inability: Not on file    Transportation needs     Medical: Not on file     Non-medical: Not on file   Tobacco Use    Smoking status: Current Every Day Smoker     Packs/day: 0.50     Types: Cigarettes    Smokeless tobacco: Never Used   Substance and Sexual Activity    Alcohol use: No    Drug use: No    Sexual activity: Not on file   Lifestyle    Physical activity     Days per week: Not on file Minutes per session: Not on file    Stress: Not on file   Relationships    Social connections     Talks on phone: Not on file     Gets together: Not on file     Attends Oriental orthodox service: Not on file     Active member of club or organization: Not on file     Attends meetings of clubs or organizations: Not on file     Relationship status: Not on file    Intimate partner violence     Fear of current or ex partner: Not on file     Emotionally abused: Not on file     Physically abused: Not on file     Forced sexual activity: Not on file   Other Topics Concern    Not on file   Social History Narrative        DIABETES--DX 1-09    SMOKER    WEIGHT    OA    CHRONIC LOW BACK PAIN    BULGE LUMBAR DISC----WC WITH DR GARY    ELEV TSH IN PAST    LIPID    AFTERNOON SHIFT [employer] MOLDED PRODUCTS    SEVERE NON COMPLIANCE    OBESITY    BRO  2013 AGE 47 MI--SMOKER---OBESE    ELEV WBC 12-14    ELEV CREA 12-14    PROTEINURIA    UTI -    [employer] MOLDED CO    ER WITH URINE RETENTION -----FLEY--UROL    TOTAL L;EFT NEPHRECTOMY ---RENAL CELL CARCINOMA-----  DR OLIVA----TWO    MASS LEFT KIDNEY----CYST ON RIGHT KIDNEY      Past Medical History:   Diagnosis Date    Diabetes mellitus ([facility]     Hyperlipidemia     UTI (urinary tract infection)      No family history on file. Past Surgical History:   Procedure Laterality Date    TOTAL NEPHRECTOMY Left 2016      Vitals:    21 0819   BP: 135/78   Pulse: 82   Temp: 98.1 °F (36.7 °C)   TempSrc: Oral   SpO2: 97%   Weight: 191 lb (86.6 kg)       Objective:    Physical Exam  Vitals signs reviewed. Constitutional:       Appearance: She is well-developed. HENT:      Head: Normocephalic. Eyes:      Pupils: Pupils are equal, round, and reactive to light. Neck:      Musculoskeletal: Normal range of motion. Cardiovascular:      Rate and Rhythm: Normal rate and regular rhythm.    Pulmonary:      Effort: Pulmonary effort is normal.      Breath sounds: Normal breath sounds. Abdominal:      Palpations: Abdomen is soft. Musculoskeletal: Normal range of motion. Skin:     General: Skin is warm. Neurological:      Mental Status: She is alert and oriented to person, place, and time. Psychiatric:         Behavior: Behavior normal.         Stefanie Jackson was seen today for dizziness. Diagnoses and all orders for this visit:    Dizzy    Type 2 diabetes mellitus without complication, without long-term current use of insulin ([address]    Other orders  -     mupirocin (BACTROBAN) 2 % cream; Apply topically 3 times daily. Comments:  Refill cream   Off work  Til 2-22  See me after neuro appt andif form needs done  A great deal of time spent reviewing medications, diet, exercise, social issues. Also reviewing the chart before entering the room with patient and finishing charting after leaving patient's room. More than half of that time was spent face to face with the patient in counseling and coordinating care. Follow Up: Return for rtw   monday 3-22, Reg Appt.      Seen by:  Fadi Bass DO

## 2021-03-15 ENCOUNTER — HOSPITAL ENCOUNTER (OUTPATIENT)
Age: 61
Discharge: HOME | End: 2021-03-15
Payer: COMMERCIAL

## 2021-03-15 ENCOUNTER — OFFICE VISIT (OUTPATIENT)
Dept: NEUROLOGY | Age: 61
End: 2021-03-15
Payer: COMMERCIAL

## 2021-03-15 VITALS
TEMPERATURE: 97.7 F | SYSTOLIC BLOOD PRESSURE: 150 MMHG | HEIGHT: 63 IN | BODY MASS INDEX: 33.84 KG/M2 | WEIGHT: 191 LBS | DIASTOLIC BLOOD PRESSURE: 80 MMHG

## 2021-03-15 DIAGNOSIS — R26.9 GAIT DISTURBANCE: ICD-10-CM

## 2021-03-15 DIAGNOSIS — E11.42 DIABETIC POLYNEUROPATHY ASSOCIATED WITH TYPE 2 DIABETES MELLITUS (HCC): ICD-10-CM

## 2021-03-15 DIAGNOSIS — M51.36 LUMBAR DEGENERATIVE DISC DISEASE: Chronic | ICD-10-CM

## 2021-03-15 DIAGNOSIS — R42 DIZZINESS AND GIDDINESS: Primary | ICD-10-CM

## 2021-03-15 DIAGNOSIS — N28.9 RENAL IMPAIRMENT: ICD-10-CM

## 2021-03-15 DIAGNOSIS — R20.2 NUMBNESS AND TINGLING: ICD-10-CM

## 2021-03-15 DIAGNOSIS — R20.0 NUMBNESS AND TINGLING: ICD-10-CM

## 2021-03-15 DIAGNOSIS — Z91.81 HX OF FALL: ICD-10-CM

## 2021-03-15 DIAGNOSIS — R42 POSTURAL DIZZINESS: ICD-10-CM

## 2021-03-15 LAB
ALBUMIN SERPL-MCNC: 4.1 G/DL (ref 3.5–5.2)
AMMONIA: 16.6 UMOL/L (ref 11–51)
ANION GAP SERPL CALCULATED.3IONS-SCNC: 9 MMOL/L (ref 7–16)
BUN BLDV-MCNC: 30 MG/DL (ref 8–23)
CALCIUM SERPL-MCNC: 9.5 MG/DL (ref 8.6–10.2)
CHLORIDE BLD-SCNC: 103 MMOL/L (ref 98–107)
CO2: 24 MMOL/L (ref 22–29)
CREAT SERPL-MCNC: 1.3 MG/DL (ref 0.5–1)
FOLATE: 5.2 NG/ML (ref 4.8–24.2)
GFR AFRICAN AMERICAN: 50
GFR NON-AFRICAN AMERICAN: 42 ML/MIN/1.73
GLUCOSE BLD-MCNC: 128 MG/DL (ref 74–99)
MAGNESIUM: 2.1 MG/DL (ref 1.6–2.6)
PHOSPHORUS: 3.9 MG/DL (ref 2.5–4.5)
POTASSIUM SERPL-SCNC: 4.9 MMOL/L (ref 3.5–5)
SEDIMENTATION RATE, ERYTHROCYTE: 42 MM/HR (ref 0–20)
SODIUM BLD-SCNC: 136 MMOL/L (ref 132–146)
TOTAL CK: 47 U/L (ref 20–180)
VITAMIN B-12: >2000 PG/ML (ref 211–946)

## 2021-03-15 PROCEDURE — 82085 ASSAY OF ALDOLASE: CPT

## 2021-03-15 PROCEDURE — 80069 RENAL FUNCTION PANEL: CPT

## 2021-03-15 PROCEDURE — 85651 RBC SED RATE NONAUTOMATED: CPT

## 2021-03-15 PROCEDURE — 84165 PROTEIN E-PHORESIS SERUM: CPT

## 2021-03-15 PROCEDURE — 99204 OFFICE O/P NEW MOD 45 MIN: CPT | Performed by: PSYCHIATRY & NEUROLOGY

## 2021-03-15 PROCEDURE — 86235 NUCLEAR ANTIGEN ANTIBODY: CPT

## 2021-03-15 PROCEDURE — 84425 ASSAY OF VITAMIN B-1: CPT

## 2021-03-15 PROCEDURE — 86038 ANTINUCLEAR ANTIBODIES: CPT

## 2021-03-15 PROCEDURE — 82140 ASSAY OF AMMONIA: CPT

## 2021-03-15 PROCEDURE — 82746 ASSAY OF FOLIC ACID SERUM: CPT

## 2021-03-15 PROCEDURE — 82607 VITAMIN B-12: CPT

## 2021-03-15 PROCEDURE — 84207 ASSAY OF VITAMIN B-6: CPT

## 2021-03-15 PROCEDURE — 82525 ASSAY OF COPPER: CPT

## 2021-03-15 PROCEDURE — 83735 ASSAY OF MAGNESIUM: CPT

## 2021-03-15 PROCEDURE — 86592 SYPHILIS TEST NON-TREP QUAL: CPT

## 2021-03-15 PROCEDURE — 36415 COLL VENOUS BLD VENIPUNCTURE: CPT

## 2021-03-15 PROCEDURE — 82550 ASSAY OF CK (CPK): CPT

## 2021-03-15 ASSESSMENT — ENCOUNTER SYMPTOMS
BACK PAIN: 1
GASTROINTESTINAL NEGATIVE: 1
RESPIRATORY NEGATIVE: 1
ALLERGIC/IMMUNOLOGIC NEGATIVE: 1
EYES NEGATIVE: 1

## 2021-03-15 NOTE — PROGRESS NOTES
[Phenazopyridine Hcl]        Patient Active Problem List   Diagnosis    Cancer of kidney ([facility] [address]    Mass, brain    Vasovagal syncope    Type 2 diabetes mellitus without complication, without long-term current use of insulin ([facility])    Vitamin D deficiency    Mixed hyperlipidemia    Neuropathy    Light headedness    Neurologic gait dysfunction    Lumbar degenerative disc disease    Numbness and tingling    Postural dizziness       Past Medical History:   Diagnosis Date    Diabetes mellitus ([facility] [address]     Hyperlipidemia     Lumbar degenerative disc disease 3/15/2021    Numbness and tingling 3/15/2021    Postural dizziness 3/15/2021    UTI (urinary tract infection)        Past Surgical History:   Procedure Laterality Date    TOTAL NEPHRECTOMY Left 09/27/2016       No family history on file.     Social History     Socioeconomic History    Marital status:      Spouse name: Not on file    Number of children: Not on file    Years of education: Not on file    Highest education level: Not on file   Occupational History    Not on file   Social Needs    Financial resource strain: Not on file    Food insecurity     Worry: Not on file     Inability: Not on file    Transportation needs     Medical: Not on file     Non-medical: Not on file   Tobacco Use    Smoking status: Current Every Day Smoker     Packs/day: 1.00     Types: Cigarettes    Smokeless tobacco: Never Used   Substance and Sexual Activity    Alcohol use: No    Drug use: No    Sexual activity: Not on file   Lifestyle    Physical activity     Days per week: Not on file     Minutes per session: Not on file    Stress: Not on file   Relationships    Social connections     Talks on phone: Not on file     Gets together: Not on file     Attends Scientologist service: Not on file     Active member of club or organization: Not on file     Attends meetings of clubs or organizations: Not on file     Relationship status: Not on file    Intimate partner violence     Fear of current or ex partner: Not on file     Emotionally abused: Not on file     Physically abused: Not on file     Forced sexual activity: Not on file   Other Topics Concern    Not on file   Social History Narrative        DIABETES--DX 1-09    SMOKER    WEIGHT    OA    CHRONIC LOW BACK PAIN    BULGE LUMBAR DISC----WC WITH DR GARY    ELEV TSH IN PAST    LIPID    AFTERNOON SHIFT [employer]CTS    SEVERE NON COMPLIANCE    OBESITY    BRO  2013 AGE 47 MI--SMOKER---OBESE    ELEV WBC 12-14    ELEV CREA 12-14    PROTEINURIA    UTI -16    [employer] M[employer]ED CO    ER WITH URINE RETENTION -----FLEY--UROL    TOTAL L;EFT NEPHRECTOMY ---RENAL CELL CARCINOMA-----  DR OLIVA----TWO    MASS LEFT KIDNEY----CYST ON RIGHT KIDNEY     Review of Systems   Constitutional: Negative. HENT: Negative. Eyes: Negative. Respiratory: Negative. Cardiovascular: Negative. Gastrointestinal: Negative. Endocrine:        Type 2 diabetes mellitus, hyperglycemia   Genitourinary: Negative. Musculoskeletal: Positive for arthralgias, back pain and gait problem. Skin: Negative. Allergic/Immunologic: Negative. Neurological: Positive for dizziness, light-headedness and numbness. Hematological: Negative. Psychiatric/Behavioral: The patient is nervous/anxious. All other systems reviewed and are negative. Neurologic Exam:  BP (!) 150/80 (Site: Right Upper Arm, Position: Sitting, Cuff Size: Medium Adult)   Temp 97.7 °F (36.5 °C)   Ht 5' 3\" (1.6 m)   Wt 191 lb (86.6 kg)   BMI 33.83 kg/m²   General appearance: Alert, cooperative, anxious, obese, seated on the exam table, well nourished, no acute distress. HEENT: Normocephalic/atraumatic.   Neck: Supple, no adventitious sounds  Cardiac: RRR  Respiratory: grossly clear  Extremities: No edema, erythema or cyanosis  Skin: No apparent lesions or rashes  Musculoskeletal: No fasciculations or tremors, no foot drop, negative bilateral straight leg raising test.  Mental Status: Alert, oriented x3  Speech/Language: Clear, grossly fluent  Attention span/Concentration: Mildly reduced with easy distractibility. Affect/Mood: Moderate anxiety level. Insight/Judgement: [employer] of Knowledge/Current events: Unable to accurately assess  CN II-XII:     Pupils: Equal, reactive to light, 2.5 mm     EOM's: Full without nystagmus  Visual Fields: Full to confrontation  Fundi: Miosis to light, grossly unremarkable  CN V: normal V1-V3  CN VII: No facial droop  CN VIII: Hearing grossly intact  CN IX-XII: No palatal asymmetry, tongue midline  SCM/Trapezii: 5/5 power  Motor: 5/5 power in the upper and lower extremities, somewhat effort related without tremor or drift and normal motor tone without cogwheeling or spasticity, intact fine motor function of both hands, symmetric. DTR's: 1+ and symmetric in the upper extremities, 1+ patellar, trace to absent ankle jerks, no ankle clonus, plantar responses are flexor. Sensory: Reports decreased sensation in a stocking distribution to above the level of the knees and grossly intact in the upper extremities. Absent vibratory sensation at the ankles. Mildly reduced vibratory sensation at the wrists. No right/left confusion. Coordination/Gait: No gross limb dysmetria on finger-to-nose testing. Somewhat moderate wide-based stance and gait, two-step turns. Probable sensory gait ataxia component related to chronic diabetic peripheral polyneuropathy. Assessment/Plan:  1. Probable peripheral vestibulopathy or vasovagal presyncope contributing to intermittent dizziness and lightheadedness symptoms with postural or positional component. 2.  Chronic diabetic peripheral polyneuropathy likely affecting gait and balance function. A diabetic autonomic neuropathy component cannot be excluded. 3.  History of fall without loss of consciousness with minor closed head injury, may have contributed to a postconcussive syndrome.   4. Future     Standing Expiration Date:   3/15/2022    Vitamin B6     Standing Status:   Future     Standing Expiration Date:   3/15/2022    Vitamin B12 & Folate     Standing Status:   Future     Standing Expiration Date:   3/15/2022    Vitamin B1     Standing Status:   Future     Standing Expiration Date:   3/15/2022    Sjogren's Ab (SS-A, SS-B)     Standing Status:   Future     Standing Expiration Date:   3/15/2022    Sedimentation Rate     Standing Status:   Future     Standing Expiration Date:   3/15/2022    RPR Reflex to Titer and TPPA     Standing Status:   Future     Standing Expiration Date:   3/15/2022    C-Reactive Protein     Standing Status:   Future     Standing Expiration Date:   3/15/2022    Copper, Serum     Standing Status:   Future     Standing Expiration Date:   3/15/2022    Electrophoresis Protein, Serum without Reflex to Immunofixation     Standing Status:   Future     Standing Expiration Date:   3/15/2022    CK     Standing Status:   Future     Standing Expiration Date:   3/15/2022    Aldolase     Standing Status:   Future     Standing Expiration Date:   3/15/2022    Renal Function Panel     Standing Status:   Future     Standing Expiration Date:   3/15/2022    LOUANN     Standing Status:   Future     Standing Expiration Date:   3/15/2022

## 2021-03-16 ENCOUNTER — TELEPHONE (OUTPATIENT)
Dept: NEUROLOGY | Age: 61
End: 2021-03-16

## 2021-03-16 LAB
ANTI-NUCLEAR ANTIBODY (ANA): NEGATIVE
RPR: NORMAL

## 2021-03-16 NOTE — TELEPHONE ENCOUNTER
----- Message from Saima Reynolds MD sent at 3/15/2021  3:38 PM EDT -----  Notify patient her BUN and creatinine and blood glucose values have increased compared to prior valuesadvise she contact her PCP office for further advisement. She will not be able to complete the MRI scan with contrast in this case, only withoutplease notify radiology department.

## 2021-03-17 LAB
ALBUMIN SERPL-MCNC: 3.5 G/DL (ref 3.5–4.7)
ALPHA-1-GLOBULIN: 0.2 G/DL (ref 0.2–0.4)
ALPHA-2-GLOBULIN: 1 G/FL (ref 0.5–1)
BETA GLOBULIN: 1.2 G/DL (ref 0.8–1.3)
ELECTROPHORESIS: NORMAL
ENA TO SSA (RO) ANTIBODY: NEGATIVE
ENA TO SSB (LA) ANTIBODY: NEGATIVE
GAMMA GLOBULIN: 1.4 G/DL (ref 0.7–1.6)
TOTAL PROTEIN: 7.4 G/DL (ref 6.4–8.3)

## 2021-03-18 ENCOUNTER — OFFICE VISIT (OUTPATIENT)
Dept: PRIMARY CARE CLINIC | Age: 61
End: 2021-03-18
Payer: COMMERCIAL

## 2021-03-18 VITALS
BODY MASS INDEX: 33.49 KG/M2 | HEIGHT: 63 IN | WEIGHT: 189 LBS | SYSTOLIC BLOOD PRESSURE: 132 MMHG | TEMPERATURE: 97.9 F | DIASTOLIC BLOOD PRESSURE: 82 MMHG

## 2021-03-18 DIAGNOSIS — R26.9 NEUROLOGIC GAIT DYSFUNCTION: ICD-10-CM

## 2021-03-18 DIAGNOSIS — M51.36 LUMBAR DEGENERATIVE DISC DISEASE: Chronic | ICD-10-CM

## 2021-03-18 DIAGNOSIS — R42 POSTURAL DIZZINESS: ICD-10-CM

## 2021-03-18 DIAGNOSIS — E11.9 TYPE 2 DIABETES MELLITUS WITHOUT COMPLICATION, WITHOUT LONG-TERM CURRENT USE OF INSULIN (HCC): Primary | Chronic | ICD-10-CM

## 2021-03-18 DIAGNOSIS — N18.31 STAGE 3A CHRONIC KIDNEY DISEASE (HCC): ICD-10-CM

## 2021-03-18 LAB
ALDOLASE: 2.6 U/L (ref 1.5–8.1)
COPPER: 113.5 UG/DL (ref 80–155)
VITAMIN B6: 63.9 NMOL/L (ref 20–125)

## 2021-03-18 PROCEDURE — 99214 OFFICE O/P EST MOD 30 MIN: CPT | Performed by: FAMILY MEDICINE

## 2021-03-18 ASSESSMENT — ENCOUNTER SYMPTOMS
EYES NEGATIVE: 1
ALLERGIC/IMMUNOLOGIC NEGATIVE: 1
GASTROINTESTINAL NEGATIVE: 1
RESPIRATORY NEGATIVE: 1

## 2021-03-18 NOTE — PROGRESS NOTES
3/18/21  Name: Art Mcfarland    : 1960    Sex: female    Age: 63 y.o. Subjective:  Chief Complaint: Patient is here for follow-up after neurology appointment. Change in symptoms. She soon neurology and schedule an MRI of the brain cervical and lumbar spine. Initially planned with contrast but her labs showed an elevated BUN and creatinine of 30 and 1.3. We will get this rechecked. She is not presently on any diuretics or on an anti-inflammatory. Advised to increase fluids. Her tentative return to work date is next week but without the change that she is now walking with a cane      Review of Systems   Constitutional: Negative. HENT: Negative. Eyes: Negative. Respiratory: Negative. Cardiovascular: Negative. Gastrointestinal: Negative. Endocrine: Negative. Genitourinary: Negative. Musculoskeletal:        See HPI   Skin: Negative. Allergic/Immunologic: Negative. Neurological:        See HPI   Hematological: Negative. Psychiatric/Behavioral: Negative. Current Outpatient Medications:     mupirocin (BACTROBAN) 2 % cream, Apply topically 3 times daily. , Disp: 1 Tube, Rfl: 5    vitamin D (ERGOCALCIFEROL) 1.25 MG (29309 UT) CAPS capsule, Take 1 capsule by mouth once a week, Disp: 12 capsule, Rfl: 5    glipiZIDE (GLUCOTROL) 5 MG tablet, Take 1 tablet by mouth 2 times daily (before meals), Disp: 180 tablet, Rfl: 5  Allergies   Allergen Reactions    Codeine     Pyridium [Phenazopyridine Hcl]      Social History     Socioeconomic History    Marital status:      Spouse name: Not on file    Number of children: Not on file    Years of education: Not on file    Highest education level: Not on file   Occupational History    Not on file   Social Needs    Financial resource strain: Not on file    Food insecurity     Worry: Not on file     Inability: Not on file    Transportation needs     Medical: Not on file     Non-medical: Not on file   Tobacco Use    Smoking status: Current Every Day Smoker     Packs/day: 1.00     Types: Cigarettes    Smokeless tobacco: Never Used   Substance and Sexual Activity    Alcohol use: No    Drug use: No    Sexual activity: Not on file   Lifestyle    Physical activity     Days per week: Not on file     Minutes per session: Not on file    Stress: Not on file   Relationships    Social connections     Talks on phone: Not on file     Gets together: Not on file     Attends Catholic service: Not on file     Active member of club or organization: Not on file     Attends meetings of clubs or organizations: Not on file     Relationship status: Not on file    Intimate partner violence     Fear of current or ex partner: Not on file     Emotionally abused: Not on file     Physically abused: Not on file     Forced sexual activity: Not on file   Other Topics Concern    Not on file   Social History Narrative        DIABETES--    SMOKER    WEIGHT    OA    CHRONIC LOW BACK PAIN    BULGE LUMBAR DISC----WC WITH DR GARY    ELEV TSH IN PAST    LIPID    AFTERNOON SHIFT [employer]    SEVERE NON COMPLIANCE    OBESITY    BRO  2013 AGE 47 MI--SMOKER---OBESE    ELEV WBC 12-14    ELEV CREA 12-14    PROTEINURIA    UTI 16    [employer]    ER WITH URINE RETENTION -----FLEY--UROL    TOTAL L;EFT NEPHRECTOMY ---RENAL CELL CARCINOMA-----  DR OLIVA----TWO    MASS LEFT KIDNEY----CYST ON RIGHT KIDNEY      Past Medical History:   Diagnosis Date    Diabetes mellitus ([facility] [address]     Hyperlipidemia     Lumbar degenerative disc disease 3/15/2021    Numbness and tingling 3/15/2021    Postural dizziness 3/15/2021    UTI (urinary tract infection)      No family history on file.    Past Surgical History:   Procedure Laterality Date    TOTAL NEPHRECTOMY Left 2016      Vitals:    21 1332   BP: 132/82   Temp: 97.9 °F (36.6 °C)   TempSrc: Oral   Weight: 189 lb (85.7 kg)   Height: 5' 3\" (1.6 m) Objective:    Physical Exam  Vitals signs reviewed. Constitutional:       Appearance: She is well-developed. HENT:      Head: Normocephalic. Eyes:      Pupils: Pupils are equal, round, and reactive to light. Neck:      Musculoskeletal: Normal range of motion. Cardiovascular:      Rate and Rhythm: Normal rate and regular rhythm. Pulmonary:      Effort: Pulmonary effort is normal.      Breath sounds: Normal breath sounds. Abdominal:      Palpations: Abdomen is soft. Musculoskeletal:      Comments: Decreased range of motion lumbar spine and both hips   Skin:     General: Skin is warm. Neurological:      Mental Status: She is alert and oriented to person, place, and time. Motor: No weakness. Coordination: Coordination normal.      Gait: Gait normal.   Psychiatric:         Behavior: Behavior normal.         Kellen Davis was seen today for dizziness. Diagnoses and all orders for this visit:    Type 2 diabetes mellitus without complication, without long-term current use of insulin (HCC)  -     CBC Auto Differential; Future  -     Comprehensive Metabolic Panel; Future    Lumbar degenerative disc disease    Neurologic gait dysfunction  -     CBC Auto Differential; Future  -     Comprehensive Metabolic Panel; Future    Postural dizziness  -     CBC Auto Differential; Future  -     Comprehensive Metabolic Panel; Future    Stage 3a chronic kidney disease  -     CBC Auto Differential; Future  -     Comprehensive Metabolic Panel; Future        Comments: [place] paperwork filled out today. Moving her return to work date to April 19. Follow-up with neurology. Await MRI scans. We will do blood work in 2 weeks. Increase fluids. No anti-inflammatories. Range of motion exercises taught. Encouraged use cane. Caution regarding fall. Check blood sugars 4 times a day. A great deal of time spent reviewing medications, diet, exercise, social issues.  Also reviewing the chart before entering the room with patient and finishing charting after leaving patient's room. More than half of that time was spent face to face with the patient in counseling and coordinating care. Follow Up: Return in about 3 weeks (around 4/8/2021) for Lab Before--note for  rtw    4-19-21.      Seen by:  Magnolia Aschoff, DO

## 2021-03-19 LAB — VITAMIN B1 WHOLE BLOOD: 133 NMOL/L (ref 70–180)

## 2021-03-23 ENCOUNTER — HOSPITAL ENCOUNTER (OUTPATIENT)
Dept: MRI IMAGING | Age: 61
Discharge: HOME | End: 2021-03-25
Payer: COMMERCIAL

## 2021-03-23 ENCOUNTER — TELEPHONE (OUTPATIENT)
Dept: PRIMARY CARE CLINIC | Age: 61
End: 2021-03-23

## 2021-03-23 DIAGNOSIS — E11.42 DIABETIC POLYNEUROPATHY ASSOCIATED WITH TYPE 2 DIABETES MELLITUS (HCC): ICD-10-CM

## 2021-03-23 DIAGNOSIS — N28.9 RENAL IMPAIRMENT: ICD-10-CM

## 2021-03-23 DIAGNOSIS — R42 DIZZINESS AND GIDDINESS: ICD-10-CM

## 2021-03-23 DIAGNOSIS — R20.2 NUMBNESS AND TINGLING: ICD-10-CM

## 2021-03-23 DIAGNOSIS — R20.0 NUMBNESS AND TINGLING: ICD-10-CM

## 2021-03-23 DIAGNOSIS — R26.9 GAIT DISTURBANCE: ICD-10-CM

## 2021-03-23 PROCEDURE — 72148 MRI LUMBAR SPINE W/O DYE: CPT

## 2021-03-23 PROCEDURE — 70551 MRI BRAIN STEM W/O DYE: CPT

## 2021-03-23 PROCEDURE — 72141 MRI NECK SPINE W/O DYE: CPT

## 2021-03-24 DIAGNOSIS — M50.30 DDD (DEGENERATIVE DISC DISEASE), CERVICAL: Primary | ICD-10-CM

## 2021-03-24 DIAGNOSIS — R93.7 BONE MARROW EDEMA: ICD-10-CM

## 2021-03-25 ENCOUNTER — TELEPHONE (OUTPATIENT)
Dept: NEUROLOGY | Age: 61
End: 2021-03-25

## 2021-03-25 DIAGNOSIS — R93.7 BONE MARROW EDEMA: ICD-10-CM

## 2021-03-25 DIAGNOSIS — M50.30 DDD (DEGENERATIVE DISC DISEASE), CERVICAL: Primary | ICD-10-CM

## 2021-03-25 PROBLEM — G93.89 MASS, BRAIN: Status: RESOLVED | Noted: 2020-10-06 | Resolved: 2021-03-25

## 2021-03-25 PROBLEM — I63.81 MULTIPLE LACUNAR INFARCTS (HCC): Chronic | Status: ACTIVE | Noted: 2021-03-25

## 2021-03-25 NOTE — TELEPHONE ENCOUNTER
MR cervical spine with contrast canceled, patient agreed to have Neurosurgery spine referral regarding abnormalities of MR cervical spine showing bone marrow edema at several levels and degenerative disc disease. Complained of out-of-pocket costs.

## 2021-04-07 DIAGNOSIS — R42 POSTURAL DIZZINESS: ICD-10-CM

## 2021-04-07 DIAGNOSIS — E11.9 TYPE 2 DIABETES MELLITUS WITHOUT COMPLICATION, WITHOUT LONG-TERM CURRENT USE OF INSULIN (HCC): Chronic | ICD-10-CM

## 2021-04-07 DIAGNOSIS — N18.31 STAGE 3A CHRONIC KIDNEY DISEASE (HCC): ICD-10-CM

## 2021-04-07 DIAGNOSIS — R26.9 NEUROLOGIC GAIT DYSFUNCTION: ICD-10-CM

## 2021-04-07 LAB
ALBUMIN SERPL-MCNC: 4.4 G/DL (ref 3.5–5.2)
ALP BLD-CCNC: 98 U/L (ref 35–104)
ALT SERPL-CCNC: 10 U/L (ref 0–32)
ANION GAP SERPL CALCULATED.3IONS-SCNC: 16 MMOL/L (ref 7–16)
AST SERPL-CCNC: 11 U/L (ref 0–31)
BASOPHILS ABSOLUTE: 0.06 E9/L (ref 0–0.2)
BASOPHILS RELATIVE PERCENT: 0.5 % (ref 0–2)
BILIRUB SERPL-MCNC: 0.3 MG/DL (ref 0–1.2)
BUN BLDV-MCNC: 29 MG/DL (ref 8–23)
CALCIUM SERPL-MCNC: 9.7 MG/DL (ref 8.6–10.2)
CHLORIDE BLD-SCNC: 104 MMOL/L (ref 98–107)
CO2: 21 MMOL/L (ref 22–29)
CREAT SERPL-MCNC: 1.3 MG/DL (ref 0.5–1)
EOSINOPHILS ABSOLUTE: 0.29 E9/L (ref 0.05–0.5)
EOSINOPHILS RELATIVE PERCENT: 2.4 % (ref 0–6)
GFR AFRICAN AMERICAN: 50
GFR NON-AFRICAN AMERICAN: 42 ML/MIN/1.73
GLUCOSE BLD-MCNC: 119 MG/DL (ref 74–99)
HCT VFR BLD CALC: 41.1 % (ref 34–48)
HEMOGLOBIN: 13.3 G/DL (ref 11.5–15.5)
IMMATURE GRANULOCYTES #: 0.06 E9/L
IMMATURE GRANULOCYTES %: 0.5 % (ref 0–5)
LYMPHOCYTES ABSOLUTE: 3.17 E9/L (ref 1.5–4)
LYMPHOCYTES RELATIVE PERCENT: 25.8 % (ref 20–42)
MCH RBC QN AUTO: 30.5 PG (ref 26–35)
MCHC RBC AUTO-ENTMCNC: 32.4 % (ref 32–34.5)
MCV RBC AUTO: 94.3 FL (ref 80–99.9)
MONOCYTES ABSOLUTE: 0.82 E9/L (ref 0.1–0.95)
MONOCYTES RELATIVE PERCENT: 6.7 % (ref 2–12)
NEUTROPHILS ABSOLUTE: 7.9 E9/L (ref 1.8–7.3)
NEUTROPHILS RELATIVE PERCENT: 64.1 % (ref 43–80)
PDW BLD-RTO: 12.8 FL (ref 11.5–15)
PLATELET # BLD: 357 E9/L (ref 130–450)
PMV BLD AUTO: 9.8 FL (ref 7–12)
POTASSIUM SERPL-SCNC: 4.6 MMOL/L (ref 3.5–5)
RBC # BLD: 4.36 E12/L (ref 3.5–5.5)
SODIUM BLD-SCNC: 141 MMOL/L (ref 132–146)
TOTAL PROTEIN: 7.7 G/DL (ref 6.4–8.3)
WBC # BLD: 12.3 E9/L (ref 4.5–11.5)

## 2021-04-08 ENCOUNTER — OFFICE VISIT (OUTPATIENT)
Dept: PRIMARY CARE CLINIC | Age: 61
End: 2021-04-08
Payer: COMMERCIAL

## 2021-04-08 VITALS
BODY MASS INDEX: 34.01 KG/M2 | WEIGHT: 192 LBS | TEMPERATURE: 97.8 F | SYSTOLIC BLOOD PRESSURE: 134 MMHG | DIASTOLIC BLOOD PRESSURE: 78 MMHG

## 2021-04-08 DIAGNOSIS — E11.9 TYPE 2 DIABETES MELLITUS WITHOUT COMPLICATION, WITHOUT LONG-TERM CURRENT USE OF INSULIN (HCC): Primary | Chronic | ICD-10-CM

## 2021-04-08 DIAGNOSIS — C64.2 MALIGNANT NEOPLASM OF LEFT KIDNEY (HCC): Chronic | ICD-10-CM

## 2021-04-08 DIAGNOSIS — M51.36 LUMBAR DEGENERATIVE DISC DISEASE: Chronic | ICD-10-CM

## 2021-04-08 DIAGNOSIS — I63.81 MULTIPLE LACUNAR INFARCTS (HCC): Chronic | ICD-10-CM

## 2021-04-08 PROCEDURE — 99214 OFFICE O/P EST MOD 30 MIN: CPT | Performed by: FAMILY MEDICINE

## 2021-04-08 ASSESSMENT — ENCOUNTER SYMPTOMS
ALLERGIC/IMMUNOLOGIC NEGATIVE: 1
GASTROINTESTINAL NEGATIVE: 1
BACK PAIN: 1
RESPIRATORY NEGATIVE: 1
EYES NEGATIVE: 1

## 2021-04-08 ASSESSMENT — PATIENT HEALTH QUESTIONNAIRE - PHQ9
1. LITTLE INTEREST OR PLEASURE IN DOING THINGS: 0
SUM OF ALL RESPONSES TO PHQ9 QUESTIONS 1 & 2: 0
SUM OF ALL RESPONSES TO PHQ QUESTIONS 1-9: 0
SUM OF ALL RESPONSES TO PHQ QUESTIONS 1-9: 0

## 2021-04-08 NOTE — PROGRESS NOTES
21  Name: Leandra Smith    : 1960    Sex: female    Age: 63 y.o. Subjective:  Chief Complaint: Patient is here for re ck  Re diazyz and pain     lwo bk pain   dizy  unstaedy  Gait  Saw dr schreiber and mri done  mrio roger mclain    Several    cva  Lumbar  An dcerv with poss   mesd  And more   Mri orderd by dr Kuldeep Avilez but ins  dneid  Sees   Neuro surg     Dr Guzman Knee for  Dr mIani Mahmood  And pt to her yet  Referral insystem    Pt feels unable to work   Will  Chg    rtw  6-1    doiscscu lab --se emeone m o  witl  Re do  Renal ufunc  Inc fluids      Review of Systems   Constitutional: Negative. HENT: Negative. Dizzy and balance poor   Eyes: Negative. Respiratory: Negative. Cardiovascular: Negative. Gastrointestinal: Negative. Endocrine: Negative. Genitourinary: Negative. Musculoskeletal: Positive for back pain. Skin: Negative. Allergic/Immunologic: Negative. Neurological: Negative. Hematological: Negative. Psychiatric/Behavioral: Negative. Current Outpatient Medications:     mupirocin (BACTROBAN) 2 % cream, Apply topically 3 times daily. , Disp: 1 Tube, Rfl: 5    vitamin D (ERGOCALCIFEROL) 1.25 MG (24301 UT) CAPS capsule, Take 1 capsule by mouth once a week, Disp: 12 capsule, Rfl: 5    glipiZIDE (GLUCOTROL) 5 MG tablet, Take 1 tablet by mouth 2 times daily (before meals), Disp: 180 tablet, Rfl: 5  Allergies   Allergen Reactions    Codeine     Pyridium [Phenazopyridine Hcl]      Social History     Socioeconomic History    Marital status:      Spouse name: Not on file    Number of children: Not on file    Years of education: Not on file    Highest education level: Not on file   Occupational History    Not on file   Social Needs    Financial resource strain: Not on file    Food insecurity     Worry: Not on file     Inability: Not on file    Transportation needs     Medical: Not on file     Non-medical: Not on file   Tobacco Use    Smoking status: Current Every Day Smoker     Packs/day: 1.00     Types: Cigarettes    Smokeless tobacco: Never Used   Substance and Sexual Activity    Alcohol use: No    Drug use: No    Sexual activity: Not on file   Lifestyle    Physical activity     Days per week: Not on file     Minutes per session: Not on file    Stress: Not on file   Relationships    Social connections     Talks on phone: Not on file     Gets together: Not on file     Attends Nondenominational service: Not on file     Active member of club or organization: Not on file     Attends meetings of clubs or organizations: Not on file     Relationship status: Not on file    Intimate partner violence     Fear of current or ex partner: Not on file     Emotionally abused: Not on file     Physically abused: Not on file     Forced sexual activity: Not on file   Other Topics Concern    Not on file   Social History Narrative        DIABETES--DX 1-09    SMOKER    WEIGHT    OA    CHRONIC LOW BACK PAIN    BULGE LUMBAR DISC----WC WITH DR GARY    ELEV TSH IN PAST    LIPID    AFTERNOON SHIFT [employer]    SEVERE NON COMPLIANCE    OBESITY    BRO  2013 AGE 47 MI--SMOKER---OBESE    ELEV WBC 12-14    ELEV CREA 12-14    PROTEINURIA    UTI 16    [employer] M[employer]    ER WITH URINE RETENTION 16-----FLEY--UROL    TOTAL L;EFT NEPHRECTOMY ---RENAL CELL CARCINOMA-----  DR OLIVA----TWO    MASS LEFT KIDNEY----CYST ON RIGHT KIDNEY      Past Medical History:   Diagnosis Date    Bone marrow edema 3/25/2021    Abnl. C-spine MRI wo/mikel. Patient refused MR C-spien with contrast, c/o out-of-pocket cost of other MRI's    DDD (degenerative disc disease), cervical 3/25/2021    Diabetes mellitus ([address]     Hyperlipidemia     Lumbar degenerative disc disease 3/15/2021    Multiple lacunar infarcts ([address] 3/25/2021    Numbness and tingling 3/15/2021    Postural dizziness 3/15/2021    UTI (urinary tract infection)      No family history on file.    Past Surgical History:   Procedure Laterality Date    TOTAL NEPHRECTOMY Left 09/27/2016      Vitals:    04/08/21 1105   BP: 134/78   Temp: 97.8 °F (36.6 °C)   TempSrc: Oral   Weight: 192 lb (87.1 kg)       Objective:    Physical Exam  Vitals signs reviewed. Constitutional:       Appearance: She is well-developed. HENT:      Head: Normocephalic. Eyes:      Pupils: Pupils are equal, round, and reactive to light. Neck:      Musculoskeletal: Normal range of motion. Cardiovascular:      Rate and Rhythm: Normal rate and regular rhythm. Pulmonary:      Effort: Pulmonary effort is normal.      Breath sounds: Normal breath sounds. Abdominal:      Palpations: Abdomen is soft. Musculoskeletal: Normal range of motion. Skin:     General: Skin is warm. Neurological:      Mental Status: She is alert and oriented to person, place, and time. Psychiatric:         Behavior: Behavior normal.         Nelly Meza was seen today for discuss labs and forms. Diagnoses and all orders for this visit:    Type 2 diabetes mellitus without complication, without long-term current use of insulin (HCC)    Lumbar degenerative disc disease    Multiple lacunar infarcts (HCC)    Malignant neoplasm of left kidney (HCC)        Comments:  Form filel alana  See me    inthre mo lab b efore  . A great deal of time spent reviewing medications, diet, exercise, social issues. Also reviewing the chart before entering the room with patient and finishing charting after leaving patient's room. More than half of that time was spent face to face with the patient in counseling and coordinating care. Await dr Lynette Dudley not work      Follow Up: Return in about 1 month (around 5/8/2021) for With BS---rtw  6-1-21.      Seen by:  Carrie Ramos DO

## 2021-04-20 ENCOUNTER — OFFICE VISIT (OUTPATIENT)
Dept: PRIMARY CARE CLINIC | Age: 61
End: 2021-04-20
Payer: COMMERCIAL

## 2021-04-20 DIAGNOSIS — E11.9 TYPE 2 DIABETES MELLITUS WITHOUT COMPLICATION, WITHOUT LONG-TERM CURRENT USE OF INSULIN (HCC): Chronic | ICD-10-CM

## 2021-04-20 DIAGNOSIS — R42 LIGHT HEADEDNESS: ICD-10-CM

## 2021-04-20 DIAGNOSIS — I63.81 MULTIPLE LACUNAR INFARCTS (HCC): Primary | Chronic | ICD-10-CM

## 2021-04-20 DIAGNOSIS — R26.9 NEUROLOGIC GAIT DYSFUNCTION: ICD-10-CM

## 2021-04-20 DIAGNOSIS — G62.9 NEUROPATHY: ICD-10-CM

## 2021-04-20 PROCEDURE — 99214 OFFICE O/P EST MOD 30 MIN: CPT | Performed by: FAMILY MEDICINE

## 2021-04-20 NOTE — PROGRESS NOTES
21  Name: Summer Marie    : 1960    Sex: female    Age: 63 y.o. Subjective:  Chief Complaint: Patient is here for gait, dizziness, unsteadiness,     Patient presents today to review her gait issues and dizziness. She sees neurosurgery on May 16. She last worked on . She has back pain bilateral leg pain especially at nighttime. She needs a 2 different insurance forms filled out 1 for FMLA and one for her short-term disability. She is very unsteady on her feet. Feels weakness in her legs. .  Patient states her blood sugars around 140 at home      Review of Systems   Constitutional: Negative. HENT: Negative. Lightheaded and dizzy at times. Eyes: Negative. Respiratory: Negative. Cardiovascular: Negative. Gastrointestinal: Negative. Endocrine: Negative. Genitourinary: Negative. Musculoskeletal: Negative. Low back and bilateral leg pain   Skin: Negative. Allergic/Immunologic: Negative. Neurological:        Weakness lower extremities   Hematological: Negative. Psychiatric/Behavioral: Negative.           Current Outpatient Medications:     vitamin D (ERGOCALCIFEROL) 1.25 MG (49405 UT) CAPS capsule, Take 1 capsule by mouth once a week, Disp: 12 capsule, Rfl: 5    glipiZIDE (GLUCOTROL) 5 MG tablet, Take 1 tablet by mouth 2 times daily (before meals), Disp: 180 tablet, Rfl: 5  Allergies   Allergen Reactions    Codeine     Pyridium [Phenazopyridine Hcl]      Social History     Socioeconomic History    Marital status:      Spouse name: Not on file    Number of children: Not on file    Years of education: Not on file    Highest education level: Not on file   Occupational History    Not on file   Social Needs    Financial resource strain: Not on file    Food insecurity     Worry: Not on file     Inability: Not on file    Transportation needs     Medical: Not on file     Non-medical: Not on file   Tobacco Use    Smoking status: Current Every Day Smoker     Packs/day: 1.00     Types: Cigarettes    Smokeless tobacco: Never Used   Substance and Sexual Activity    Alcohol use: No    Drug use: No    Sexual activity: Not on file   Lifestyle    Physical activity     Days per week: Not on file     Minutes per session: Not on file    Stress: Not on file   Relationships    Social connections     Talks on phone: Not on file     Gets together: Not on file     Attends Faith service: Not on file     Active member of club or organization: Not on file     Attends meetings of clubs or organizations: Not on file     Relationship status: Not on file    Intimate partner violence     Fear of current or ex partner: Not on file     Emotionally abused: Not on file     Physically abused: Not on file     Forced sexual activity: Not on file   Other Topics Concern    Not on file   Social History Narrative        DIABETES--DX 1-09    SMOKER    WEIGHT    OA    CHRONIC LOW BACK PAIN    BULGE LUMBAR DISC----WC WITH DR GARY    ELEV TSH IN PAST    LIPID    AFTERNOON SHIFT [employer] MOLDED PRODUCTS    SEVERE NON COMPLIANCE    OBESITY    BRO  2013 AGE 47 MI--SMOKER---OBESE    ELEV WBC 12-14    ELEV CREA 12-14    PROTEINURIA    UTI -16    [employer] MOLDED CO    ER WITH URINE RETENTION 16-----FLEY--UROL    TOTAL L;EFT NEPHRECTOMY ---RENAL CELL CARCINOMA----- - DR OLIVA----TWO    MASS LEFT KIDNEY----CYST ON RIGHT KIDNEY      Past Medical History:   Diagnosis Date    Bone marrow edema 3/25/2021    Abnl. C-spine MRI wo/mikel. Patient refused MR C-spien with contrast, c/o out-of-pocket cost of other MRI's    DDD (degenerative disc disease), cervical 3/25/2021    Diabetes mellitus ([address]     Hyperlipidemia     Lumbar degenerative disc disease 3/15/2021    Multiple lacunar infarcts ([address] 3/25/2021    Numbness and tingling 3/15/2021    Postural dizziness 3/15/2021    UTI (urinary tract infection)      No family history on file.    Past Surgical History: Procedure Laterality Date    TOTAL NEPHRECTOMY Left 09/27/2016      Vitals:    04/20/21 1406   BP: 134/82   Temp: 99 °F (37.2 °C)   TempSrc: Oral   Weight: 197 lb (89.4 kg)       Objective:    Physical Exam  Vitals signs reviewed. Constitutional:       Appearance: She is well-developed. HENT:      Head: Normocephalic. Eyes:      Pupils: Pupils are equal, round, and reactive to light. Neck:      Musculoskeletal: Normal range of motion. Cardiovascular:      Rate and Rhythm: Normal rate and regular rhythm. Pulmonary:      Effort: Pulmonary effort is normal.      Breath sounds: Normal breath sounds. Abdominal:      Palpations: Abdomen is soft. Musculoskeletal:      Comments: Paralumbar spasm with decreased flexion 50%   Skin:     General: Skin is warm. Neurological:      Mental Status: She is alert and oriented to person, place, and time. Sensory: Sensory deficit (Decreased sensation lower extremities) present. Deep Tendon Reflexes: Reflexes abnormal.      Comments: Decreased deep tendon reflexes lower extremity   Psychiatric:         Behavior: Behavior normal.         Carly Ramos was seen today for forms. Diagnoses and all orders for this visit:    Multiple lacunar infarcts (HCC)    Light headedness    Neuropathy    Type 2 diabetes mellitus without complication, without long-term current use of insulin ([facility]    Neurologic gait dysfunction        Comments: Check blood sugars four  a day. Follow-up with neurology. Await neurosurgical evaluation. Advised not work with her unsteadiness. FMLA and short-term disability form filled out. Range of motion exercises taught for arthritis and leg strengthening. Avoid heavy equipment. To stand up slowly. ASA 81 daily. A great deal of time spent reviewing medications, diet, exercise, social issues. Also reviewing the chart before entering the room with patient and finishing charting after leaving patient's room.  More than half of that time was spent face to face with the patient in counseling and coordinating care. Check blood sugars 4 times a day. Aggressive low, sugar low carbohydrate diet. Call if blood sugar less than 70 or over 200. Avoid eating in between meals. Lose weight. Exercise. Follow Up: Return for cancel 5-6 and see me     last week of may.      Seen by:  Stefan Henry, DO

## 2021-04-21 VITALS
DIASTOLIC BLOOD PRESSURE: 82 MMHG | WEIGHT: 197 LBS | TEMPERATURE: 99 F | BODY MASS INDEX: 34.9 KG/M2 | SYSTOLIC BLOOD PRESSURE: 134 MMHG

## 2021-04-21 PROBLEM — E55.9 VITAMIN D DEFICIENCY: Chronic | Status: ACTIVE | Noted: 2020-10-11

## 2021-04-21 PROBLEM — R42 LIGHT HEADEDNESS: Chronic | Status: ACTIVE | Noted: 2021-02-25

## 2021-04-21 PROBLEM — E78.2 MIXED HYPERLIPIDEMIA: Chronic | Status: ACTIVE | Noted: 2020-10-11

## 2021-04-21 PROBLEM — R26.9 NEUROLOGIC GAIT DYSFUNCTION: Chronic | Status: ACTIVE | Noted: 2021-02-25

## 2021-04-21 PROBLEM — R42 POSTURAL DIZZINESS: Chronic | Status: ACTIVE | Noted: 2021-03-15

## 2021-04-21 PROBLEM — G62.9 NEUROPATHY: Chronic | Status: ACTIVE | Noted: 2021-02-25

## 2021-04-21 ASSESSMENT — ENCOUNTER SYMPTOMS
ALLERGIC/IMMUNOLOGIC NEGATIVE: 1
GASTROINTESTINAL NEGATIVE: 1
EYES NEGATIVE: 1
RESPIRATORY NEGATIVE: 1

## 2021-05-13 ENCOUNTER — INITIAL CONSULT (OUTPATIENT)
Dept: NEUROSURGERY | Age: 61
End: 2021-05-13
Payer: COMMERCIAL

## 2021-05-13 VITALS
WEIGHT: 293 LBS | HEART RATE: 73 BPM | DIASTOLIC BLOOD PRESSURE: 77 MMHG | BODY MASS INDEX: 51.91 KG/M2 | SYSTOLIC BLOOD PRESSURE: 137 MMHG | HEIGHT: 63 IN

## 2021-05-13 DIAGNOSIS — M54.42 CHRONIC BILATERAL LOW BACK PAIN WITH BILATERAL SCIATICA: Primary | ICD-10-CM

## 2021-05-13 DIAGNOSIS — M54.41 CHRONIC BILATERAL LOW BACK PAIN WITH BILATERAL SCIATICA: Primary | ICD-10-CM

## 2021-05-13 DIAGNOSIS — G89.29 CHRONIC BILATERAL LOW BACK PAIN WITH BILATERAL SCIATICA: Primary | ICD-10-CM

## 2021-05-13 PROCEDURE — 99204 OFFICE O/P NEW MOD 45 MIN: CPT | Performed by: NEUROLOGICAL SURGERY

## 2021-05-13 RX ORDER — ASPIRIN 81 MG/1
81 TABLET ORAL DAILY
COMMUNITY

## 2021-05-13 RX ORDER — ACETAMINOPHEN 325 MG/1
650 TABLET ORAL EVERY 6 HOURS PRN
Status: ON HOLD | COMMUNITY
End: 2022-04-21 | Stop reason: ALTCHOICE

## 2021-05-13 ASSESSMENT — ENCOUNTER SYMPTOMS
BACK PAIN: 1
GASTROINTESTINAL NEGATIVE: 1
EYES NEGATIVE: 1
BOWEL INCONTINENCE: 0
RESPIRATORY NEGATIVE: 1

## 2021-05-13 NOTE — PROGRESS NOTES
Kiki Cope (:  1960) is a 63 y.o. female,New patient, here for evaluation of the following chief complaint(s): Other (loses balance, mri mercy)      ASSESSMENT/PLAN:  1. Chronic bilateral low back pain with bilateral sciatica  63year old lady who presents with some back pain and some bilateral leg pain. Her MRI shows mild to moderate foraminal stenosis at L5-S1. I do not see a surgical lesion. I am recommending an EMG and flexion-extension x-rays and PT and possible epidurals. She states that she only wants to have the EMG. I will refer her to Dr. Yusuf Self for EMG    No follow-ups on file. SUBJECTIVE/OBJECTIVE:  Back Pain  This is a recurrent problem. The current episode started more than 1 year ago. The problem occurs constantly. The pain is present in the lumbar spine. The quality of the pain is described as aching. The pain radiates to the right knee and right thigh. The pain is at a severity of 9/10. The pain is worse during the night. The symptoms are aggravated by position. Stiffness is present in the morning. Associated symptoms include leg pain, numbness, tingling and weakness. Pertinent negatives include no bladder incontinence, bowel incontinence, chest pain, dysuria, fever, headaches, paresis, paresthesias, pelvic pain, perianal numbness or weight loss. She has tried NSAIDs for the symptoms. The treatment provided mild relief. Review of Systems   Constitutional: Negative. Negative for fever and weight loss. HENT: Negative. Eyes: Negative. Respiratory: Negative. Cardiovascular: Negative. Negative for chest pain. Gastrointestinal: Negative. Negative for bowel incontinence. Endocrine: Negative. Genitourinary: Negative. Negative for bladder incontinence, dysuria and pelvic pain. Musculoskeletal: Positive for back pain. Skin: Negative. Neurological: Positive for tingling, weakness and numbness. Negative for headaches and paresthesias.    Hematological: Negative. Psychiatric/Behavioral: The patient is nervous/anxious. Physical Exam  Vitals signs reviewed. Constitutional:       General: She is not in acute distress. Appearance: Normal appearance. She is obese. She is not ill-appearing, toxic-appearing or diaphoretic. HENT:      Head: Normocephalic and atraumatic. Nose: Nose normal.   Eyes:      General: No visual field deficit or scleral icterus. Right eye: No discharge. Left eye: No discharge. Extraocular Movements: Extraocular movements intact. Conjunctiva/sclera: Conjunctivae normal.      Pupils: Pupils are equal, round, and reactive to light. Abdominal:      General: Abdomen is flat. There is no distension. Musculoskeletal: Normal range of motion. General: No swelling, tenderness, deformity or signs of injury. Right lower leg: No edema. Left lower leg: No edema. Skin:     General: Skin is warm and dry. Capillary Refill: Capillary refill takes less than 2 seconds. Coloration: Skin is not jaundiced or pale. Findings: No bruising, erythema, lesion or rash. Neurological:      General: No focal deficit present. Mental Status: She is alert and oriented to person, place, and time. Mental status is at baseline. GCS: GCS eye subscore is 4. GCS verbal subscore is 5. GCS motor subscore is 6. Cranial Nerves: Cranial nerves are intact. No cranial nerve deficit, dysarthria or facial asymmetry. Sensory: No sensory deficit. Motor: Motor function is intact. No weakness, tremor, atrophy, abnormal muscle tone, seizure activity or pronator drift. Coordination: Coordination is intact. Romberg sign negative. Coordination normal. Finger-Nose-Finger Test and Heel to [facility] Test normal. Rapid alternating movements normal.      Gait: Gait normal.      Deep Tendon Reflexes: Reflexes normal. Babinski sign absent on the right side. Babinski sign absent on the left side. Reflex Scores:       Tricep reflexes are 2+ on the right side and 2+ on the left side. Bicep reflexes are 2+ on the right side and 2+ on the left side. Brachioradialis reflexes are 2+ on the right side and 2+ on the left side. Patellar reflexes are 2+ on the right side and 2+ on the left side. Achilles reflexes are 2+ on the right side and 2+ on the left side. Psychiatric:         Mood and Affect: Mood normal.         Behavior: Behavior normal.         Thought Content: Thought content normal.         Judgment: Judgment normal.           On this date 5/13/2021 I have spent 45 minutes reviewing previous notes, test results and face to face with the patient discussing the diagnosis and importance of compliance with the treatment plan as well as documenting on the day of the visit. An electronic signature was used to authenticate this note.     --Ignacio Rodriguez MD

## 2021-05-25 ENCOUNTER — OFFICE VISIT (OUTPATIENT)
Dept: PRIMARY CARE CLINIC | Age: 61
End: 2021-05-25
Payer: COMMERCIAL

## 2021-05-25 DIAGNOSIS — G62.9 NEUROPATHY: Chronic | ICD-10-CM

## 2021-05-25 DIAGNOSIS — E11.9 TYPE 2 DIABETES MELLITUS WITHOUT COMPLICATION, WITHOUT LONG-TERM CURRENT USE OF INSULIN (HCC): Chronic | ICD-10-CM

## 2021-05-25 DIAGNOSIS — M51.36 LUMBAR DEGENERATIVE DISC DISEASE: Primary | Chronic | ICD-10-CM

## 2021-05-25 PROCEDURE — 99213 OFFICE O/P EST LOW 20 MIN: CPT | Performed by: FAMILY MEDICINE

## 2021-05-25 NOTE — PROGRESS NOTES
21  Name: Subhash Mcduffie    : 1960    Sex: female    Age: 63 y.o. Subjective:  Chief Complaint: Back and leg pain. Patient was seen by neurosurgery EMG is scheduled and is coming up in a few days. Significant back and leg discomfort. She is unsteady on her feet feet. Feels weakness in both lower extremities. She says her blood sugars are good at home. Neurosurgery offered physical therapy and injections she wants to wait till the EMG is back. Review of Systems   Constitutional: Negative. HENT: Negative. Eyes: Negative. Respiratory: Negative. Cardiovascular: Negative. Gastrointestinal: Negative. Endocrine: Negative. Genitourinary: Negative. Musculoskeletal: Negative. See HPI   Skin: Negative. Allergic/Immunologic: Negative. Neurological: Negative. Hematological: Negative. Psychiatric/Behavioral: Negative.           Current Outpatient Medications:     aspirin 81 MG EC tablet, Take 81 mg by mouth daily, Disp: , Rfl:     acetaminophen (TYLENOL) 325 MG tablet, Take 650 mg by mouth every 6 hours as needed for Pain, Disp: , Rfl:     vitamin D (ERGOCALCIFEROL) 1.25 MG (81722 UT) CAPS capsule, Take 1 capsule by mouth once a week, Disp: 12 capsule, Rfl: 5    glipiZIDE (GLUCOTROL) 5 MG tablet, Take 1 tablet by mouth 2 times daily (before meals), Disp: 180 tablet, Rfl: 5  Allergies   Allergen Reactions    Codeine     Pyridium [Phenazopyridine Hcl]      Social History     Socioeconomic History    Marital status:      Spouse name: Not on file    Number of children: Not on file    Years of education: Not on file    Highest education level: Not on file   Occupational History    Not on file   Tobacco Use    Smoking status: Current Every Day Smoker     Packs/day: 1.00     Years: 30.00     Pack years: 30.00     Types: Cigarettes    Smokeless tobacco: Never Used   Substance and Sexual Activity    Alcohol use: No    Drug use: No    Sexual activity: Not on file   Other Topics Concern    Not on file   Social History Narrative        DIABETES--DX 1-09    SMOKER    WEIGHT    OA    CHRONIC LOW BACK PAIN    BULGE LUMBAR DISC----WC WITH DR GARY    ELEV TSH IN PAST    LIPID    AFTERNOON SHIFT [employer] MOLDED PRODUCTS    SEVERE NON COMPLIANCE    OBESITY    BRO  2013 AGE 47 MI--SMOKER---OBESE    ELEV WBC 12-14    ELEV CREA 12-14    PROTEINURIA    UTI 1-16    [employer] MOLDED CO    ER WITH URINE RETENTION 6-16-----FLEY--UROL    TOTAL L;EFT NEPHRECTOMY ---RENAL CELL CARCINOMA----- - DR OLIVA----TWO    MASS LEFT KIDNEY----CYST ON RIGHT KIDNEY    [place] Yoav    Unsteady gait, multifocused CVA, seeing Dr. Miguel Umana. Surgical consult 5-21     Social Determinants of Health     Financial Resource Strain:     Difficulty of Paying Living Expenses:    Food Insecurity:     Worried About Running Out of Food in the Last Year:     [address] in the Last Year:    Transportation Needs:     Lack of Transportation (Medical):  Lack of Transportation (Non-Medical):    Physical Activity:     Days of Exercise per Week:     Minutes of Exercise per Session:    Stress:     Feeling of Stress :    Social Connections:     Frequency of Communication with Friends and Family:     Frequency of Social Gatherings with Friends and Family:     Attends [employer] Services:     Active Member of Clubs or Organizations:     Attends Club or Organization Meetings:     Marital Status:    Intimate Partner Violence:     Fear of Current or Ex-Partner:     Emotionally Abused:     Physically Abused:     Sexually Abused:       Past Medical History:   Diagnosis Date    Bone marrow edema 3/25/2021    Abnl. C-spine MRI wo/mikel.  Patient refused MR C-spien with contrast, c/o out-of-pocket cost of other MRI's    DDD (degenerative disc disease), cervical 3/25/2021    Diabetes mellitus ([address]     Hyperlipidemia     Lumbar degenerative disc disease 3/15/2021    Multiple lacunar infarcts ([facility] [facility] 3/25/2021    Numbness and tingling 3/15/2021    Postural dizziness 3/15/2021    UTI (urinary tract infection)      No family history on file. Past Surgical History:   Procedure Laterality Date    TOTAL NEPHRECTOMY Left 09/27/2016      Vitals:    05/25/21 1455   BP: 132/78   Temp: 98.4 °F (36.9 °C)   TempSrc: Oral   Weight: 196 lb (88.9 kg)       Objective:    Physical Exam  Vitals reviewed. Constitutional:       Appearance: She is well-developed. HENT:      Head: Normocephalic. Eyes:      Pupils: Pupils are equal, round, and reactive to light. Cardiovascular:      Rate and Rhythm: Normal rate and regular rhythm. Pulmonary:      Effort: Pulmonary effort is normal.      Breath sounds: Normal breath sounds. Abdominal:      Palpations: Abdomen is soft. Musculoskeletal:      Cervical back: Normal range of motion. Comments: Marked decreased flexion lumbar spine 50%. Decreased deep tendon reflexes both knees. Skin:     General: Skin is warm. Neurological:      Mental Status: She is alert and oriented to person, place, and time. Psychiatric:         Behavior: Behavior normal.         Egdar Shaffer was seen today for peripheral neuropathy. Diagnoses and all orders for this visit:    Lumbar degenerative disc disease    Neuropathy    Type 2 diabetes mellitus without complication, without long-term current use of insulin ([facility] [address]        Comments: We will move her return to work date back. Await EMG. See or call her after EMG. Offered physical therapy and injections back specialist she will consider. Advised not working return to work as August 1      A great deal of time spent reviewing medications, diet, exercise, social issues. Also reviewing the chart before entering the room with patient and finishing charting after leaving patient's room. More than half of that time was spent face to face with the patient in counseling and coordinating care. Check blood sugars 4 times a day.

## 2021-05-26 VITALS
DIASTOLIC BLOOD PRESSURE: 78 MMHG | TEMPERATURE: 98.4 F | BODY MASS INDEX: 34.72 KG/M2 | SYSTOLIC BLOOD PRESSURE: 132 MMHG | WEIGHT: 196 LBS

## 2021-05-26 ASSESSMENT — ENCOUNTER SYMPTOMS
ALLERGIC/IMMUNOLOGIC NEGATIVE: 1
EYES NEGATIVE: 1
RESPIRATORY NEGATIVE: 1
GASTROINTESTINAL NEGATIVE: 1

## 2021-05-26 ASSESSMENT — PATIENT HEALTH QUESTIONNAIRE - PHQ9
SUM OF ALL RESPONSES TO PHQ QUESTIONS 1-9: 0
SUM OF ALL RESPONSES TO PHQ9 QUESTIONS 1 & 2: 0
SUM OF ALL RESPONSES TO PHQ QUESTIONS 1-9: 0
SUM OF ALL RESPONSES TO PHQ QUESTIONS 1-9: 0

## 2021-05-27 ENCOUNTER — OFFICE VISIT (OUTPATIENT)
Dept: NEUROLOGY | Age: 61
End: 2021-05-27
Payer: COMMERCIAL

## 2021-05-27 VITALS
HEIGHT: 63 IN | DIASTOLIC BLOOD PRESSURE: 70 MMHG | BODY MASS INDEX: 34.73 KG/M2 | WEIGHT: 196 LBS | SYSTOLIC BLOOD PRESSURE: 120 MMHG

## 2021-05-27 DIAGNOSIS — M51.36 LUMBAR DEGENERATIVE DISC DISEASE: ICD-10-CM

## 2021-05-27 DIAGNOSIS — M54.17 LUMBOSACRAL RADICULOPATHY: Primary | ICD-10-CM

## 2021-05-27 DIAGNOSIS — E11.42 DIABETIC PERIPHERAL NEUROPATHY ASSOCIATED WITH TYPE 2 DIABETES MELLITUS (HCC): ICD-10-CM

## 2021-05-27 DIAGNOSIS — G89.29 CHRONIC BILATERAL LOW BACK PAIN WITH BILATERAL SCIATICA: ICD-10-CM

## 2021-05-27 DIAGNOSIS — M54.41 CHRONIC BILATERAL LOW BACK PAIN WITH BILATERAL SCIATICA: ICD-10-CM

## 2021-05-27 DIAGNOSIS — M54.42 CHRONIC BILATERAL LOW BACK PAIN WITH BILATERAL SCIATICA: ICD-10-CM

## 2021-05-27 PROBLEM — E11.65 HYPERGLYCEMIA DUE TO DIABETES MELLITUS (HCC): Chronic | Status: ACTIVE | Noted: 2021-05-27

## 2021-05-27 PROCEDURE — 95911 NRV CNDJ TEST 9-10 STUDIES: CPT | Performed by: PSYCHIATRY & NEUROLOGY

## 2021-05-27 PROCEDURE — 95886 MUSC TEST DONE W/N TEST COMP: CPT | Performed by: PSYCHIATRY & NEUROLOGY

## 2021-05-27 NOTE — PROGRESS NOTES
[address] [facility]  Electrodiagnostic Laboratory  *Accredited by the [address] [place] [address] with exemplary status  [address] [facility], [facility]  Phone: [phone]  Fax: [phone]    Referring Provider: Pascal Kanner, MD  Primary Care Physician: Henry Carvajal DO  Patient Name: Thomas Aj  Patient YOB: 1960  Gender: female  BMI: Body mass index is 34.72 kg/m². Blood pressure 120/70, height 5' 3\" (1.6 m), weight 196 lb (88.9 kg). 5/27/2021    Description of clinical problem: EMG/NCS referral for chronic lower back pain, bilateral sciatica. History of CA of kidney, diabetic neuropathy. Chief Complaint   Patient presents with    Procedure     EMG     Pain Yes   ; Numbness/tingling  Yes; Weakness  No       Brief physical exam:   Sensory deficit Yes-stocking distribution of sensory loss to above the level of the knees, grossly intact in upper extremities, absent vibratory sensation at ankles. ; Weakness No; Atrophy  No; Reflex abnormality Yes-trace patellar and to absent ankle jerks, no ankle clonus, negative Babinski's. No fasciculations or focal muscular atrophy. Musculoskeletal: Negative bilateral straight leg raising test, no foot drop. Denied leg symptoms worse on one side. Study Limitations: Obesity, pain. [address]  Electrodiagnostic Laboratory  [facility]          Full Name: Thomas Aj Gender: Female  MRN: [id number] YOB: 1960      Visit Date: 5/27/2021 09:56  Age: 63 Years 6 Months Old  Examining Physician: Dr. Ean Floyd   Referring Physician: Dr. Domenica Reddy  Technician: Lynelle Soulier   Height: 5 feet 3 inch  Weight: 196 lbs  Notes: Chronic Bilat. low back pain W/Bilat Sciatica      Motor NCS      Nerve / Sites Latency Amplitude Amp. 1-2 Distance Lat Diff Velocity Temp.    ms mV % cm ms m/s °C   R Peroneal - EDB      Ankle 4.84 2.8 100 8   31      Pop fossa 15.47 2.6 92.6 37 10.63 35 31.1   L Peroneal - EDB      Ankle NR NR NR 8   31.3 Pop fossa NR NR NR  NR  31.3   R Tibial - AH      Ankle 5.16 0.1 100 8   31.2      Pop fossa 18.02 0.0 22 39 12.86 30 31.2   L Peroneal - Tib Ant      Fib Head NR NR NR    31.1      Pop fossa NR NR NR  NR  31.1               Sensory NCS      Nerve / Sites Onset Lat Peak Lat PP Amp Amp. 1-2 Distance Velocity Temp.    ms ms µV % cm m/s °C   R Sural - Ankle (Calf)      Calf NR NR NR NR 14 NR 31.2   R Superficial peroneal - Ankle      Lat leg NR NR NR NR 10 NR 31.1   L Superficial peroneal - Ankle      Lat leg NR NR NR NR 10 NR 31.2             F  Wave      Nerve F Lat M Lat F-M Lat    ms ms ms   R Peroneal - EDB 59.0 5.2 53.8   R Tibial - AH NR NR NR       H Reflex      Nerve Lat Hmax    ms   R Tibial - Soleus 37.6   L Tibial - Soleus NR       EMG         EMG Summary Table     Spontaneous MUAP Recruitment   Muscle IA Fib PSW Fasc H.F. Amp Dur. PPP Pattern   R. Tibialis anterior Normal None None None None 3+ 2+ None Decr   R. Gastroc (Medial head) Normal None None None None 1+ 1+ None Decr   R. Flexor digitorum longus Sl Incr 1+ None None None 1+ 1+ None Decr   R. Extensor digitorum brevis Normal None None None None 1+ 2+ None Decr   R. Abductor hallucis Incr None 1+ None CRDs Normal Normal None Normal   R. Vastus lateralis Normal None None None None 1+ 1+ None Decr   L. Tibialis anterior Normal None None None None 2+ 2+ None Decr   L. Gastroc (Medial head) Normal None None None None 1+ 1+ None Decr   L. Flexor digitorum longus Incr 1+ None None None 2+ 2+ None Markedly Dec   L. Extensor digitorum brevis Normal None None None None Normal Normal None *PA, Atrophy   L. Abductor hallucis Normal None None None None Normal Normal None *PA-Pain   R. Gluteus medius Normal None None None None Normal Normal None Normal   R. Sacral paraspinals Normal None None None None -- -- -- Unable To Relax   R. Lumbar paraspinals (low) Normal None None None None -- -- -- Unable To Relax   *PA, poor activation, pain.     Summary of Findings:   Nerve conduction studies:   · The following nerve conduction studies were abnormal:   · The right sural and right and left superficial peroneal sensory nerve conductions show no response. · The right peroneal motor nerve conductions (recording from the extensor digitorum brevis and left tibialis anterior muscles) show no response on the left and the right peroneal motor nerve conduction (recording from the extensor digitorum brevis muscle) is normal in distal latency and amplitude with mildly slowed motor nerve conduction velocity. · The right tibial motor nerve conductions (recording from the abductor hallucis muscle) is normal in distal latency with markedly reduced amplitudes and slowed motor nerve conduction velocity. · The right tibial F-wave shows no response in the right peroneal F-wave is prolonged in latency. · The right H-reflex is prolonged in latency and left H-reflex shows no response. · All other nerve conduction studies listed in the table above were normal in latency, amplitude and conduction velocity. Needle EMG:   · Needle EMG was performed using a concentric needle.  The following abnormalities were seen on needle EMG: Increased insertional activity and scattered to 1+ fibrillation and  positive sharp waves noted of several muscles including the right and left flexor digitorum muscles and right abductor hallucis muscle and enlargement of the motor unit potentials in duration and amplitude with decreased recruitment (loss of motor use) of a mild to moderate degree in a right and left lumbosacral myotomal distribution (I.e., primarily L5/S1).  Examination of several muscles were compromised due to pain intolerance and excess adipose tissue thus contributing to poor activation.   All other muscles tested, as listed in the table above demonstrated normal amplitude, duration, phases and recruitment and no active denervation signs were seen.      Diagnostic Interpretation: This study was abnormal.   Electrodiagnosis: NCS/EMG examination performed of the right and left lower extremities shows electrodiagnostic evidence for the followin. A chronic right and left lumbosacral radiculopathy (I.e., primarily L5/S1 myotomal distribution) with mild ongoing and chronic denervation of a mildmoderately severe degree. 2.  A chronic diabetic sensorimotor peripheral neuropathy with chronic denervation in a distaltoproximal gradient pattern of denervation underlying the above. Clinical correlation is recommended. Previous Study: None known. Technologist:   Physician: Gregg Olszewski, MD    Nerve conduction studies and electromyography were performed according to our laboratory policies and procedures which can be provided upon request. All abnormal values are identified in the table.  Laboratory normal values can also be provided upon request.       Cc: MD Shyam Gilliam DO

## 2021-06-02 ENCOUNTER — TELEPHONE (OUTPATIENT)
Dept: PRIMARY CARE CLINIC | Age: 61
End: 2021-06-02

## 2021-07-01 ENCOUNTER — TELEPHONE (OUTPATIENT)
Dept: PRIMARY CARE CLINIC | Age: 61
End: 2021-07-01

## 2021-07-06 ENCOUNTER — OFFICE VISIT (OUTPATIENT)
Dept: PRIMARY CARE CLINIC | Age: 61
End: 2021-07-06
Payer: COMMERCIAL

## 2021-07-06 VITALS
DIASTOLIC BLOOD PRESSURE: 78 MMHG | OXYGEN SATURATION: 99 % | BODY MASS INDEX: 35.58 KG/M2 | WEIGHT: 200.8 LBS | SYSTOLIC BLOOD PRESSURE: 128 MMHG | HEIGHT: 63 IN | RESPIRATION RATE: 16 BRPM | TEMPERATURE: 97.4 F | HEART RATE: 75 BPM

## 2021-07-06 DIAGNOSIS — R26.9 NEUROLOGIC GAIT DYSFUNCTION: Chronic | ICD-10-CM

## 2021-07-06 DIAGNOSIS — E11.9 TYPE 2 DIABETES MELLITUS WITHOUT COMPLICATION, WITHOUT LONG-TERM CURRENT USE OF INSULIN (HCC): Primary | ICD-10-CM

## 2021-07-06 DIAGNOSIS — M51.36 LUMBAR DEGENERATIVE DISC DISEASE: Chronic | ICD-10-CM

## 2021-07-06 DIAGNOSIS — I63.81 MULTIPLE LACUNAR INFARCTS (HCC): Chronic | ICD-10-CM

## 2021-07-06 LAB
CHP ED QC CHECK: NORMAL
GLUCOSE BLD-MCNC: 143 MG/DL

## 2021-07-06 PROCEDURE — 99214 OFFICE O/P EST MOD 30 MIN: CPT | Performed by: FAMILY MEDICINE

## 2021-07-06 PROCEDURE — 82962 GLUCOSE BLOOD TEST: CPT | Performed by: FAMILY MEDICINE

## 2021-07-06 ASSESSMENT — PATIENT HEALTH QUESTIONNAIRE - PHQ9
SUM OF ALL RESPONSES TO PHQ QUESTIONS 1-9: 0
SUM OF ALL RESPONSES TO PHQ QUESTIONS 1-9: 0
2. FEELING DOWN, DEPRESSED OR HOPELESS: 0
SUM OF ALL RESPONSES TO PHQ QUESTIONS 1-9: 0
1. LITTLE INTEREST OR PLEASURE IN DOING THINGS: 0
SUM OF ALL RESPONSES TO PHQ9 QUESTIONS 1 & 2: 0

## 2021-07-06 NOTE — PROGRESS NOTES
SMOKER    WEIGHT    OA    CHRONIC LOW BACK PAIN    BULGE LUMBAR DISC----WC WITH DR GARY    ELEV TSH IN PAST    LIPID    AFTERNOON SHIFT [employer] MOLDED PRODUCTS    SEVERE NON COMPLIANCE    OBESITY    BRO  2013 AGE 47 MI--SMOKER---OBESE    ELEV WBC 12-14    ELEV CREA 12-14    PROTEINURIA    UTI 1-16    BRDM MOLDED CO    ER WITH URINE RETENTION 6-16-----FLEY--UROL    TOTAL L;EFT NEPHRECTOMY ---RENAL CELL CARCINOMA-----  DR OLIVA----TWO    MASS LEFT KIDNEY----CYST ON RIGHT KIDNEY    Prakash Bishop    Unsteady gait, multifocused CVA, seeing Dr. Hermilo Maher. Surgical consult     Mri    brain  3-21 iwht  radha of old  cva----referred dr Davies Brine   with dr schreiber with  two diff neuroapthy---     Social Determinants of Health     Financial Resource Strain:     Difficulty of Paying Living Expenses:    Food Insecurity:     Worried About Running Out of Food in the Last Year:     [address] in the Last Year:    Transportation Needs:     Lack of Transportation (Medical):  Lack of Transportation (Non-Medical):    Physical Activity:     Days of Exercise per Week:     Minutes of Exercise per Session:    Stress:     Feeling of Stress :    Social Connections:     Frequency of Communication with Friends and Family:     Frequency of Social Gatherings with Friends and Family:     Attends Hindu Services:     Active Member of Clubs or Organizations:     Attends Club or Organization Meetings:     Marital Status:    Intimate Partner Violence:     Fear of Current or Ex-Partner:     Emotionally Abused:     Physically Abused:     Sexually Abused:       Past Medical History:   Diagnosis Date    Bone marrow edema 3/25/2021    Abnl. C-spine MRI wo/mikel.  Patient refused MR C-spien with contrast, c/o out-of-pocket cost of other MRI's    DDD (degenerative disc disease), cervical 3/25/2021    Diabetes mellitus ([address]     Hyperglycemia due to diabetes mellitus ([address] 2021    Hyperlipidemia  Lumbar degenerative disc disease 3/15/2021    Multiple lacunar infarcts (HCC) 3/25/2021    Numbness and tingling 3/15/2021    Postural dizziness 3/15/2021    UTI (urinary tract infection)      History reviewed. No pertinent family history. Past Surgical History:   Procedure Laterality Date    TOTAL NEPHRECTOMY Left 09/27/2016      Vitals:    07/06/21 1257   BP: 128/78   Pulse: 75   Resp: 16   Temp: 97.4 °F (36.3 °C)   SpO2: 99%   Weight: 200 lb 12.8 oz (91.1 kg)   Height: 5' 3\" (1.6 m)       Objective:    Physical Exam  Vitals reviewed. Constitutional:       Appearance: She is well-developed. HENT:      Head: Normocephalic. Eyes:      Pupils: Pupils are equal, round, and reactive to light. Cardiovascular:      Rate and Rhythm: Normal rate and regular rhythm. Pulmonary:      Effort: Pulmonary effort is normal.      Breath sounds: Normal breath sounds. Abdominal:      Palpations: Abdomen is soft. Musculoskeletal:      Cervical back: Normal range of motion. Comments: Unsteady gait   Dec  str both lower legs   Skin:     General: Skin is warm. Neurological:      Mental Status: She is alert and oriented to person, place, and time. Psychiatric:         Behavior: Behavior normal.         Clif Montelongo was seen today for diabetes. Diagnoses and all orders for this visit:    Type 2 diabetes mellitus without complication, without long-term current use of insulin (HCC)  -     POCT Glucose    Multiple lacunar infarcts [facility])    Neurologic gait dysfunction    Lumbar degenerative disc disease        Comments: disucss  Mri  emg  And I advse pt   Call  Dr Nargis Matson and be seen  And go over emg  Will need another form the en dof themonth      Check blood pressure at home twice a day. Low-salt low caffeine diet. Call if systolic blood pressure is above 165 and diastolic blood pressures above 85. Only use a upper arm digital cuff. Check blood sugars 4 times a day.   Aggressive low, sugar low carbohydrate diet.  Call if blood sugar less than 70 or over 200. Avoid eating in between meals. Lose weight. Exercise. A great deal of time spent reviewing medications, diet, exercise, social issues. Also reviewing the chart before entering the room with patient and finishing charting after leaving patient's room. More than half of that time was spent face to face with the patient in counseling and coordinating care. Follow Up: Return for end of thismonth with fbs----morning appt.      Seen by:  Cecile Jasmine,

## 2021-07-23 ENCOUNTER — TELEPHONE (OUTPATIENT)
Dept: PRIMARY CARE CLINIC | Age: 61
End: 2021-07-23

## 2021-07-23 DIAGNOSIS — C64.2 MALIGNANT NEOPLASM OF LEFT KIDNEY (HCC): ICD-10-CM

## 2021-07-23 DIAGNOSIS — N18.31 STAGE 3A CHRONIC KIDNEY DISEASE (HCC): ICD-10-CM

## 2021-07-23 DIAGNOSIS — E11.9 TYPE 2 DIABETES MELLITUS WITHOUT COMPLICATION, WITHOUT LONG-TERM CURRENT USE OF INSULIN (HCC): Primary | ICD-10-CM

## 2021-07-23 DIAGNOSIS — E55.9 VITAMIN D DEFICIENCY: ICD-10-CM

## 2021-07-23 NOTE — TELEPHONE ENCOUNTER
I tried to call Erica Reyes to let her know that the pt was at the lab, but she was busy and unable to answer.  Our phone system is different so the call volume has increased so I forgot to continue to call Erica Reyes, and the pt left the lab

## 2021-07-23 NOTE — TELEPHONE ENCOUNTER
The pt is at the lab to get her labs drawn but there are no orders in the computer, can you please place an order

## 2021-07-26 DIAGNOSIS — E55.9 VITAMIN D DEFICIENCY: ICD-10-CM

## 2021-07-26 DIAGNOSIS — E11.9 TYPE 2 DIABETES MELLITUS WITHOUT COMPLICATION, WITHOUT LONG-TERM CURRENT USE OF INSULIN (HCC): ICD-10-CM

## 2021-07-26 DIAGNOSIS — C64.2 MALIGNANT NEOPLASM OF LEFT KIDNEY (HCC): ICD-10-CM

## 2021-07-26 DIAGNOSIS — N18.31 STAGE 3A CHRONIC KIDNEY DISEASE (HCC): ICD-10-CM

## 2021-07-26 LAB
ALBUMIN SERPL-MCNC: 3.9 G/DL (ref 3.5–5.2)
ALP BLD-CCNC: 111 U/L (ref 35–104)
ALT SERPL-CCNC: 7 U/L (ref 0–32)
ANION GAP SERPL CALCULATED.3IONS-SCNC: 13 MMOL/L (ref 7–16)
AST SERPL-CCNC: 10 U/L (ref 0–31)
BASOPHILS ABSOLUTE: 0.07 E9/L (ref 0–0.2)
BASOPHILS RELATIVE PERCENT: 0.7 % (ref 0–2)
BILIRUB SERPL-MCNC: 0.2 MG/DL (ref 0–1.2)
BUN BLDV-MCNC: 15 MG/DL (ref 6–23)
CALCIUM SERPL-MCNC: 9.6 MG/DL (ref 8.6–10.2)
CHLORIDE BLD-SCNC: 105 MMOL/L (ref 98–107)
CHOLESTEROL, TOTAL: 208 MG/DL (ref 0–199)
CO2: 24 MMOL/L (ref 22–29)
CREAT SERPL-MCNC: 1.1 MG/DL (ref 0.5–1)
EOSINOPHILS ABSOLUTE: 0.24 E9/L (ref 0.05–0.5)
EOSINOPHILS RELATIVE PERCENT: 2.3 % (ref 0–6)
GFR AFRICAN AMERICAN: >60
GFR NON-AFRICAN AMERICAN: 50 ML/MIN/1.73
GLUCOSE BLD-MCNC: 143 MG/DL (ref 74–99)
HBA1C MFR BLD: 6.6 % (ref 4–5.6)
HCT VFR BLD CALC: 39.4 % (ref 34–48)
HDLC SERPL-MCNC: 37 MG/DL
HEMOGLOBIN: 12.9 G/DL (ref 11.5–15.5)
IMMATURE GRANULOCYTES #: 0.03 E9/L
IMMATURE GRANULOCYTES %: 0.3 % (ref 0–5)
LDL CHOLESTEROL CALCULATED: 136 MG/DL (ref 0–99)
LYMPHOCYTES ABSOLUTE: 2.57 E9/L (ref 1.5–4)
LYMPHOCYTES RELATIVE PERCENT: 25 % (ref 20–42)
MCH RBC QN AUTO: 30 PG (ref 26–35)
MCHC RBC AUTO-ENTMCNC: 32.7 % (ref 32–34.5)
MCV RBC AUTO: 91.6 FL (ref 80–99.9)
MONOCYTES ABSOLUTE: 0.65 E9/L (ref 0.1–0.95)
MONOCYTES RELATIVE PERCENT: 6.3 % (ref 2–12)
NEUTROPHILS ABSOLUTE: 6.7 E9/L (ref 1.8–7.3)
NEUTROPHILS RELATIVE PERCENT: 65.4 % (ref 43–80)
PDW BLD-RTO: 14.1 FL (ref 11.5–15)
PLATELET # BLD: 301 E9/L (ref 130–450)
PMV BLD AUTO: 10.4 FL (ref 7–12)
POTASSIUM SERPL-SCNC: 4.3 MMOL/L (ref 3.5–5)
RBC # BLD: 4.3 E12/L (ref 3.5–5.5)
SODIUM BLD-SCNC: 142 MMOL/L (ref 132–146)
TOTAL PROTEIN: 7.2 G/DL (ref 6.4–8.3)
TRIGL SERPL-MCNC: 176 MG/DL (ref 0–149)
VITAMIN D 25-HYDROXY: 26 NG/ML (ref 30–100)
VLDLC SERPL CALC-MCNC: 35 MG/DL
WBC # BLD: 10.3 E9/L (ref 4.5–11.5)

## 2021-07-27 ENCOUNTER — TELEPHONE (OUTPATIENT)
Dept: PRIMARY CARE CLINIC | Age: 61
End: 2021-07-27

## 2021-07-27 ENCOUNTER — OFFICE VISIT (OUTPATIENT)
Dept: PRIMARY CARE CLINIC | Age: 61
End: 2021-07-27
Payer: COMMERCIAL

## 2021-07-27 VITALS — BODY MASS INDEX: 35.79 KG/M2 | WEIGHT: 202 LBS | HEIGHT: 63 IN | TEMPERATURE: 97.6 F

## 2021-07-27 DIAGNOSIS — E78.2 MIXED HYPERLIPIDEMIA: ICD-10-CM

## 2021-07-27 DIAGNOSIS — M51.36 LUMBAR DEGENERATIVE DISC DISEASE: Chronic | ICD-10-CM

## 2021-07-27 DIAGNOSIS — M81.0 AGE-RELATED OSTEOPOROSIS WITHOUT CURRENT PATHOLOGICAL FRACTURE: ICD-10-CM

## 2021-07-27 DIAGNOSIS — E11.9 TYPE 2 DIABETES MELLITUS WITHOUT COMPLICATION, WITHOUT LONG-TERM CURRENT USE OF INSULIN (HCC): Primary | ICD-10-CM

## 2021-07-27 DIAGNOSIS — I63.89 CEREBRAL INFARCTION DUE TO OTHER MECHANISM (HCC): ICD-10-CM

## 2021-07-27 DIAGNOSIS — R26.9 NEUROLOGIC GAIT DYSFUNCTION: Chronic | ICD-10-CM

## 2021-07-27 PROBLEM — I63.9 CEREBRAL INFARCT (HCC): Status: ACTIVE | Noted: 2021-07-27

## 2021-07-27 LAB
CHP ED QC CHECK: NORMAL
GLUCOSE BLD-MCNC: 159 MG/DL

## 2021-07-27 PROCEDURE — 99214 OFFICE O/P EST MOD 30 MIN: CPT | Performed by: FAMILY MEDICINE

## 2021-07-27 PROCEDURE — 82962 GLUCOSE BLOOD TEST: CPT | Performed by: FAMILY MEDICINE

## 2021-07-27 RX ORDER — PRAVASTATIN SODIUM 20 MG
20 TABLET ORAL DAILY
Qty: 90 TABLET | Refills: 5 | Status: ON HOLD
Start: 2021-07-27 | End: 2021-08-05 | Stop reason: HOSPADM

## 2021-07-27 NOTE — PROGRESS NOTES
21  Name: John Hernandez    : 1960    Sex: female    Age: 57 y.o. Subjective:  Chief Complaint: Patient is here for    3 mo ck  Re  Lab     Kidney function    Sugar   Vit  D  And also       4  Week  Ck re  Legs  wekaneses     She called  Dr Nasima Quinones and told nto need follow up  She feels  Balance poor and  Weakness les  No cp or  Sob   Lab ith     Chol and  ghb up     Ros neg      Review of Systems   Constitutional: Negative. HENT: Negative. Eyes: Negative. Respiratory: Negative. Cardiovascular: Negative. Gastrointestinal: Negative. Endocrine: Negative. Genitourinary: Negative. Musculoskeletal: Positive for arthralgias. See  hpi   Skin: Negative. Allergic/Immunologic: Negative. Neurological:        See  hpi   Hematological: Negative. Psychiatric/Behavioral: Negative.           Current Outpatient Medications:     pravastatin (PRAVACHOL) 20 MG tablet, Take 1 tablet by mouth daily, Disp: 90 tablet, Rfl: 5    aspirin 81 MG EC tablet, Take 81 mg by mouth daily, Disp: , Rfl:     acetaminophen (TYLENOL) 325 MG tablet, Take 650 mg by mouth every 6 hours as needed for Pain, Disp: , Rfl:     vitamin D (ERGOCALCIFEROL) 1.25 MG (39361 UT) CAPS capsule, Take 1 capsule by mouth once a week, Disp: 12 capsule, Rfl: 5    glipiZIDE (GLUCOTROL) 5 MG tablet, Take 1 tablet by mouth 2 times daily (before meals), Disp: 180 tablet, Rfl: 5  Allergies   Allergen Reactions    Codeine     Pyridium [Phenazopyridine Hcl]      Social History     Socioeconomic History    Marital status:      Spouse name: Not on file    Number of children: Not on file    Years of education: Not on file    Highest education level: Not on file   Occupational History    Not on file   Tobacco Use    Smoking status: Current Every Day Smoker     Packs/day: 1.00     Years: 30.00     Pack years: 30.00     Types: Cigarettes    Smokeless tobacco: Never Used   Substance and Sexual Activity    Alcohol use: No    Drug use: No    Sexual activity: Not on file   Other Topics Concern    Not on file   Social History Narrative        DIABETES--DX 1-09    SMOKER    WEIGHT    OA    CHRONIC LOW BACK PAIN    BULGE LUMBAR DISC----WC WITH DR GARY    ELEV TSH IN PAST    LIPID    AFTERNOON SHIFT [employer] MOLDED PRODUCTS    SEVERE NON COMPLIANCE    OBESITY    BRO  2013 AGE 47 MI--SMOKER---OBESE    ELEV WBC 12-14    ELEV CREA 12-14    PROTEINURIA    UTI 1-16    [employer] MOLDED CO    ER WITH URINE RETENTION -----FLEY--UROL    TOTAL L;EFT NEPHRECTOMY ---RENAL CELL CARCINOMA-----  DR OLIVA----TWO    MASS LEFT KIDNEY----CYST ON RIGHT KIDNEY    Boss  Thalia Fruit    Unsteady gait, multifocused CVA, seeing Dr. Rosemary Perez. Surgical consult     Mri    brain  3-21 iwht  radha of old  cva----referred dr Ananya Miller   with dr schreiber with  two diff neuroapthy---     Social Determinants of Health     Financial Resource Strain:     Difficulty of Paying Living Expenses:    Food Insecurity:     Worried About Running Out of Food in the Last Year:     [address] in the Last Year:    Transportation Needs:     Lack of Transportation (Medical):  Lack of Transportation (Non-Medical):    Physical Activity:     Days of Exercise per Week:     Minutes of Exercise per Session:    Stress:     Feeling of Stress :    Social Connections:     Frequency of Communication with Friends and Family:     Frequency of Social Gatherings with Friends and Family:     Attends [employer]ist Services:     Active Member of Clubs or Organizations:     Attends Club or Organization Meetings:     Marital Status:    Intimate Partner Violence:     Fear of Current or Ex-Partner:     Emotionally Abused:     Physically Abused:     Sexually Abused:       Past Medical History:   Diagnosis Date    Bone marrow edema 3/25/2021    Abnl. C-spine MRI wo/mikel.  Patient refused MR C-spien with contrast, c/o out-of-pocket cost of other MRI's    DDD (degenerative disc disease), cervical 3/25/2021    Diabetes mellitus [facility]     Hyperglycemia due to diabetes mellitus ([facility] 5/27/2021    Hyperlipidemia     Lumbar degenerative disc disease 3/15/2021    Multiple lacunar infarcts (HCC) 3/25/2021    Numbness and tingling 3/15/2021    Postural dizziness 3/15/2021    UTI (urinary tract infection)      No family history on file. Past Surgical History:   Procedure Laterality Date    TOTAL NEPHRECTOMY Left 09/27/2016      Vitals:    07/27/21 0840   Temp: 97.6 °F (36.4 °C)   TempSrc: Oral   Weight: 202 lb (91.6 kg)   Height: 5' 3\" (1.6 m)       Objective:    Physical Exam  Vitals reviewed. Constitutional:       Appearance: She is well-developed. HENT:      Head: Normocephalic. Eyes:      Pupils: Pupils are equal, round, and reactive to light. Cardiovascular:      Rate and Rhythm: Normal rate and regular rhythm. Pulmonary:      Effort: Pulmonary effort is normal.      Breath sounds: Normal breath sounds. Abdominal:      Palpations: Abdomen is soft. Musculoskeletal:         General: Normal range of motion. Cervical back: Normal range of motion. Skin:     General: Skin is warm. Neurological:      Mental Status: She is alert and oriented to person, place, and time. Psychiatric:         Behavior: Behavior normal.         Madhu De Guzman was seen today for discuss labs. Diagnoses and all orders for this visit:    Type 2 diabetes mellitus without complication, without long-term current use of insulin (HCC)  -     POCT Glucose    Neurologic gait dysfunction    Lumbar degenerative disc disease    Cerebral infarction due to other mechanism (HCC)    Mixed hyperlipidemia  -     pravastatin (PRAVACHOL) 20 MG tablet; Take 1 tablet by mouth daily        Comments: advised fu with neuro      Cont asa  Diet exer  Hm  Issues      Add  Statin              rtw chg to  12-1-21    Check blood sugars 4 times a day. Aggressive low, sugar low carbohydrate diet.   Call if blood sugar less than 70 or over 200. Avoid eating in between meals. Lose weight. Exercise. A great deal of time spent reviewing medications, diet, exercise, social issues. Also reviewing the chart before entering the room with patient and finishing charting after leaving patient's room. More than half of that time was spent face to face with the patient in counseling and coordinating care. Follow Up: Return in about 3 months (around 10/27/2021) for Lab Before----rtw  12-1-2021.      Seen by:  Candie Izquierdo DO

## 2021-07-31 ENCOUNTER — HOSPITAL ENCOUNTER (EMERGENCY)
Age: 61
Discharge: ANOTHER HOSPITAL | End: 2021-07-31
Attending: EMERGENCY MEDICINE
Payer: COMMERCIAL

## 2021-07-31 ENCOUNTER — APPOINTMENT (OUTPATIENT)
Dept: GENERAL RADIOLOGY | Age: 61
End: 2021-07-31
Payer: COMMERCIAL

## 2021-07-31 ENCOUNTER — APPOINTMENT (OUTPATIENT)
Dept: CT IMAGING | Age: 61
End: 2021-07-31
Payer: COMMERCIAL

## 2021-07-31 VITALS
SYSTOLIC BLOOD PRESSURE: 172 MMHG | HEART RATE: 64 BPM | DIASTOLIC BLOOD PRESSURE: 91 MMHG | BODY MASS INDEX: 34.49 KG/M2 | OXYGEN SATURATION: 99 % | WEIGHT: 202 LBS | RESPIRATION RATE: 16 BRPM | HEIGHT: 64 IN | TEMPERATURE: 97.4 F

## 2021-07-31 DIAGNOSIS — I63.9 ACUTE CVA (CEREBROVASCULAR ACCIDENT) (HCC): Primary | ICD-10-CM

## 2021-07-31 LAB
ACETAMINOPHEN LEVEL: <5 MCG/ML (ref 10–30)
ALBUMIN SERPL-MCNC: 4 G/DL (ref 3.5–5.2)
ALP BLD-CCNC: 133 U/L (ref 35–104)
ALT SERPL-CCNC: 8 U/L (ref 0–32)
ANION GAP SERPL CALCULATED.3IONS-SCNC: 10 MMOL/L (ref 7–16)
APTT: 30.7 SEC (ref 24.5–35.1)
AST SERPL-CCNC: 10 U/L (ref 0–31)
BASOPHILS ABSOLUTE: 0.04 E9/L (ref 0–0.2)
BASOPHILS RELATIVE PERCENT: 0.3 % (ref 0–2)
BILIRUB SERPL-MCNC: 0.3 MG/DL (ref 0–1.2)
BUN BLDV-MCNC: 18 MG/DL (ref 6–23)
CALCIUM SERPL-MCNC: 9.4 MG/DL (ref 8.6–10.2)
CHLORIDE BLD-SCNC: 105 MMOL/L (ref 98–107)
CO2: 25 MMOL/L (ref 22–29)
CREAT SERPL-MCNC: 1 MG/DL (ref 0.5–1)
EOSINOPHILS ABSOLUTE: 0.18 E9/L (ref 0.05–0.5)
EOSINOPHILS RELATIVE PERCENT: 1.6 % (ref 0–6)
ETHANOL: <10 MG/DL (ref 0–0.08)
GFR AFRICAN AMERICAN: >60
GFR NON-AFRICAN AMERICAN: 56 ML/MIN/1.73
GLUCOSE BLD-MCNC: 142 MG/DL (ref 74–99)
HCT VFR BLD CALC: 41 % (ref 34–48)
HEMOGLOBIN: 13.7 G/DL (ref 11.5–15.5)
IMMATURE GRANULOCYTES #: 0.02 E9/L
IMMATURE GRANULOCYTES %: 0.2 % (ref 0–5)
INR BLD: 1
LACTIC ACID: 1.2 MMOL/L (ref 0.5–2.2)
LIPASE: 24 U/L (ref 13–60)
LYMPHOCYTES ABSOLUTE: 2.17 E9/L (ref 1.5–4)
LYMPHOCYTES RELATIVE PERCENT: 18.9 % (ref 20–42)
MCH RBC QN AUTO: 30.6 PG (ref 26–35)
MCHC RBC AUTO-ENTMCNC: 33.4 % (ref 32–34.5)
MCV RBC AUTO: 91.5 FL (ref 80–99.9)
METER GLUCOSE: 135 MG/DL (ref 74–99)
MONOCYTES ABSOLUTE: 0.63 E9/L (ref 0.1–0.95)
MONOCYTES RELATIVE PERCENT: 5.5 % (ref 2–12)
NEUTROPHILS ABSOLUTE: 8.43 E9/L (ref 1.8–7.3)
NEUTROPHILS RELATIVE PERCENT: 73.5 % (ref 43–80)
PDW BLD-RTO: 14 FL (ref 11.5–15)
PLATELET # BLD: 313 E9/L (ref 130–450)
PMV BLD AUTO: 9.6 FL (ref 7–12)
POTASSIUM REFLEX MAGNESIUM: 4.3 MMOL/L (ref 3.5–5)
PROTHROMBIN TIME: 11.3 SEC (ref 9.3–12.4)
RBC # BLD: 4.48 E12/L (ref 3.5–5.5)
SALICYLATE, SERUM: <0.3 MG/DL (ref 0–30)
SODIUM BLD-SCNC: 140 MMOL/L (ref 132–146)
TOTAL PROTEIN: 7.5 G/DL (ref 6.4–8.3)
TRICYCLIC ANTIDEPRESSANTS SCREEN SERUM: NEGATIVE NG/ML
TROPONIN, HIGH SENSITIVITY: 28 NG/L (ref 0–9)
WBC # BLD: 11.5 E9/L (ref 4.5–11.5)

## 2021-07-31 PROCEDURE — 70498 CT ANGIOGRAPHY NECK: CPT

## 2021-07-31 PROCEDURE — 6360000004 HC RX CONTRAST MEDICATION: Performed by: RADIOLOGY

## 2021-07-31 PROCEDURE — 84484 ASSAY OF TROPONIN QUANT: CPT

## 2021-07-31 PROCEDURE — 70450 CT HEAD/BRAIN W/O DYE: CPT

## 2021-07-31 PROCEDURE — 83605 ASSAY OF LACTIC ACID: CPT

## 2021-07-31 PROCEDURE — 83690 ASSAY OF LIPASE: CPT

## 2021-07-31 PROCEDURE — 85025 COMPLETE CBC W/AUTO DIFF WBC: CPT

## 2021-07-31 PROCEDURE — 6360000002 HC RX W HCPCS: Performed by: EMERGENCY MEDICINE

## 2021-07-31 PROCEDURE — 93005 ELECTROCARDIOGRAM TRACING: CPT | Performed by: PHYSICIAN ASSISTANT

## 2021-07-31 PROCEDURE — 85730 THROMBOPLASTIN TIME PARTIAL: CPT

## 2021-07-31 PROCEDURE — 80143 DRUG ASSAY ACETAMINOPHEN: CPT

## 2021-07-31 PROCEDURE — 2580000003 HC RX 258: Performed by: RADIOLOGY

## 2021-07-31 PROCEDURE — 96374 THER/PROPH/DIAG INJ IV PUSH: CPT

## 2021-07-31 PROCEDURE — 70496 CT ANGIOGRAPHY HEAD: CPT

## 2021-07-31 PROCEDURE — 0042T CT BRAIN PERFUSION: CPT

## 2021-07-31 PROCEDURE — 82077 ASSAY SPEC XCP UR&BREATH IA: CPT

## 2021-07-31 PROCEDURE — 80179 DRUG ASSAY SALICYLATE: CPT

## 2021-07-31 PROCEDURE — 80307 DRUG TEST PRSMV CHEM ANLYZR: CPT

## 2021-07-31 PROCEDURE — 85610 PROTHROMBIN TIME: CPT

## 2021-07-31 PROCEDURE — 82962 GLUCOSE BLOOD TEST: CPT

## 2021-07-31 PROCEDURE — 99285 EMERGENCY DEPT VISIT HI MDM: CPT

## 2021-07-31 PROCEDURE — 80053 COMPREHEN METABOLIC PANEL: CPT

## 2021-07-31 PROCEDURE — 71045 X-RAY EXAM CHEST 1 VIEW: CPT

## 2021-07-31 RX ORDER — SODIUM CHLORIDE 0.9 % (FLUSH) 0.9 %
10 SYRINGE (ML) INJECTION PRN
Status: COMPLETED | OUTPATIENT
Start: 2021-07-31 | End: 2021-07-31

## 2021-07-31 RX ORDER — LORAZEPAM 2 MG/ML
1 INJECTION INTRAMUSCULAR ONCE
Status: COMPLETED | OUTPATIENT
Start: 2021-07-31 | End: 2021-07-31

## 2021-07-31 RX ADMIN — LORAZEPAM 1 MG: 2 INJECTION INTRAMUSCULAR; INTRAVENOUS at 21:58

## 2021-07-31 RX ADMIN — Medication 10 ML: at 19:55

## 2021-07-31 RX ADMIN — IOPAMIDOL 75 ML: 755 INJECTION, SOLUTION INTRAVENOUS at 19:58

## 2021-07-31 NOTE — ED PROVIDER NOTES
[facility]  Department of Emergency Medicine     Written by: Miriam Craig DO  Patient Name: Jose Tai Date: 2021  5:52 PM  MRN: [id number]                   : 1960        Chief Complaint   Patient presents with    Altered Mental Status     LKW 0630. left sided facial droop, garbled speech    - Chief complaint    Patient is a 26-year-old female past medical history of diabetes, hyperlipidemia and multiple CVAs. Patient's last known well  30 this morning. Patient was found approximately 2 hours prior to arrival by family members. According to family patient had significant left-sided facial droop, slurred speech and left-sided weakness. They note the patient has some residual bilateral lower extremity weakness from previous strokes however they stated that her symptoms were much more severe today including worsening speech. On arrival patient alert and oriented to person place and time. She states that she does have left sided weakness which has significantly worsened over the last day. She also notes that she feels as though she is having difficulty finding words. Patient states symptoms are moderate in severity and constant since onset she denies any exacerbating or relieving factors. Patient does note a somewhat similar episode with the previous strokes. Patient denies any fevers, chills, nausea, vomiting, chest pain, cough or shortness of breath. The history is provided by the patient and a relative. No  was used. Review of Systems   Constitutional: Negative for chills and fever. HENT: Negative for ear pain, sinus pressure and sore throat. Eyes: Negative for pain, discharge and redness. Respiratory: Negative for cough, shortness of breath and wheezing. Cardiovascular: Negative for chest pain. Gastrointestinal: Negative for abdominal distention, diarrhea, nausea and vomiting.    Genitourinary: Negative for dysuria and frequency. Musculoskeletal: Negative for arthralgias and back pain. Skin: Negative for rash and wound. Neurological: Positive for speech difficulty, weakness and numbness. Negative for headaches. Hematological: Negative for adenopathy. All other systems reviewed and are negative. Physical Exam  Vitals and nursing note reviewed. Constitutional:       General: She is not in acute distress. Appearance: Normal appearance. HENT:      Head: Normocephalic and atraumatic. Nose: No congestion or rhinorrhea. Mouth/Throat:      Mouth: Mucous membranes are moist.      Pharynx: Oropharynx is clear. Eyes:      Extraocular Movements: Extraocular movements intact. Pupils: Pupils are equal, round, and reactive to light. Cardiovascular:      Rate and Rhythm: Normal rate and regular rhythm. Heart sounds: No murmur heard. No gallop. Pulmonary:      Effort: Pulmonary effort is normal. No respiratory distress. Breath sounds: No wheezing, rhonchi or rales. Abdominal:      General: Abdomen is flat. Palpations: Abdomen is soft. There is no mass. Tenderness: There is no abdominal tenderness. There is no guarding. Hernia: No hernia is present. Musculoskeletal:         General: No swelling, tenderness or signs of injury. Normal range of motion. Cervical back: Normal range of motion. No rigidity. No muscular tenderness. Skin:     General: Skin is warm and dry. Capillary Refill: Capillary refill takes less than 2 seconds. Neurological:      General: No focal deficit present. Mental Status: She is alert and oriented to person, place, and time. Mental status is at baseline. Comments:  On neurological exam, patient with significant left facial droop, patient also with significant 3 out of 5 left upper and left lower extremity muscle strength, strength 5 out of 5 to right upper and lower extremity, patient notes decrease sensation to the left side of her face. Patient with mild dysarthria and aphasia. Psychiatric:         Mood and Affect: Mood normal.         Behavior: Behavior normal.        NIH Stroke Scale/Score at time of initial evaluation:  1A: Level of Consciousness 0 - alert; keenly responsive   1B: Ask Month and Age 0 - answers both questions correctly   1C: Tell Patient To Open and Close Eyes, then Hand  Squeeze 0 - performs both tasks correctly   2: Test Horizontal Extraocular Movements 0 - normal   3: Test Visual Fields 0 - no visual loss   4: Test Facial Palsy 1 - minor paralysis (flattened nasolabial fold, asymmetric on smiling)   5A: Test Left Arm Motor Drift 2 - some effort against gravity, limb cannot get to or maintain (if cued) 90 (or 45) degrees, drifts down to bed, but has some effort against gravity    5B: Test Right Arm Motor Drift 0 - no drift, limb holds 90 (or 45) degrees for full 10 seconds   6A: Test Left Leg Motor Drift 2 - some effort against gravity; leg falls to bed by 5 seconds but has some effort against gravity   6B: Test Right Leg Motor Drift 0 - no drift; leg holds 30 degree position for full 5 seconds   7: Test Limb Ataxia   (FNF/Heel-Shin) 1 - present in one limb   8: Test Sensation 1 - mild to moderate sensory loss; patient feels pinprick is less sharp or is dull on the affected side; there is a loss of superficial pain with pinprick but patient is aware of being touched    9: Test Language/Aphasia 1 - mild to moderate aphasia; some obvious loss of fluency or facility of comprehension without significant limitation on ideas expressed or form of expression. Reduction of speech and/or comprehension, however, makes conversation about provided materials difficult or impossible. For example, in conversation about provided materials, examiner can identify picture or naming card content from patient's response.     10: Test Dysarthria 1 - mild to moderate, patient slurs at least some words and at worst, can be understood with some difficulty   11: Test Extinction/Inattention 0 - no abnormality   Total 10       Stroke outcome at three months in the placebo group of the NINDS trial 2   Baseline NIH Stroke Scale Percent with favorable outcome (NIHSS=0 or 1)   Age <60y   0-9 42   10-14 18   5-20 27   >20 12   Age 62-73y   0-9 37    10-14 25    5-20 25   >20 0   Age 75-65y   0-9 47   10-14 32   5-20 0   >20 0   Age >75y   0-9 36   10-14 15   5-20 6   >20 0     tPA Criteria*   Inclusion criteria:   - Ischemic stroke onset within 3 hours of drug administration   - Age 25 or older   - No hemorrhage or non-stroke cause of deficit on CT   - Measurable deficit on NIH Stroke Scale     Exclusion criteria: If the patient. ...   - has minor or improving symptoms   - had seizure at onset of stroke   - has had another stroke or serious head trauma within the last 3 months   - has had major surgery within the last 14 days   - has known history of intracranial hemorrhage   - has sustained systolic blood pressure >057 mmHg   - has sustained diastolic blood pressure >878 mmHg   - requires aggressive treatment is necessary to lower their blood pressure   - has symptoms suggestive of subarachnoid hemorrhage   - has had GI or urinary tract hemorrhage within the last 21 days   - has had an arterial puncture at a non-compressible site within the last 7 days   - received heparin within the last 48 hours and has an elevated PTT   - has a prothrombin time (PT) >15 seconds   - has a platelet count <705,803 uL   - serum blood glucose is <50 mg/dL or >400 mg/dL     Relative Contraindications:   - NIH Stroke score >22   - Patient's CT shows evidence of large MCA territory infarction (>1/3 the MCA territory)        Procedures   EKG #1:  Interpreted by emergency department physician unless otherwise noted. Time:  1828   Rate: 65  Rhythm: Sinus.   Interpretation: EKG reviewed demonstrated normal sinus rhythm, rate 65, normal axis, , no acute ST segment changes. Comparison: was normal.      MDM  Number of Diagnoses or Management Options  Acute CVA (cerebrovascular accident) [facility])  Diagnosis management comments: Patient is a 50-year-old female past medical history of diabetes, hyperlipidemia multiple CVAs. Patient presents with chief complaint of left sided weakness, facial numbness as well as dysarthria. Vital signs stable presentation supple mild hypertension. On physical exam initial NIH of 10 due to left-sided weakness as well as sensation alteration and dysarthria. Heart regular rate and rhythm, lungs clear to auscultation bilaterally, abdomen soft nontender. Abdominal alert called, EKG obtained demonstrated no acute ischemic changes. CBC demonstrated no acute abnormalities, CMP demonstrated mildly elevated glucose of 142, elevated alk phos of 133. INR 1.0, lactic acid 1.2, lipase 24, APTT 30.7, troponin was obtained and was 28. Serum drug screen negative. Chest x-ray obtained demonstrate no acute abnormalities. CT scan of the head without contrast demonstrated multifocal areas of old insults with encephalomalacia, no new acute sizable infarcts or hemorrhagic events. Spoke with telemetry neurology they recommended obtaining CT scans of the head and neck with IV contrast as well as CT of effusions. CT scan of the head demonstrated short segment moderate to severe stenosis in the right M1 segment, as well as benign meningioma. CT scan of the neck demonstrated no acute abnormalities, CT perfusion demonstrated no sizable infarct. Case discussed with radiology and neurology, patient not TPA candidate no large vessel occlusion noted. Findings consistent with acute CVA. Patient does require neurology evaluation which is currently not available at this facility. Decision made to transfer patient. Case discussed with hospitalist at [facility] who is agreed to accept patient in transfer.   Plan of care discussed with patient and family at the bedside including transfer, all questions were answered, patient was in agreement plan of care and transferred to [facility] in stable condition. ED Course as of Jul 31 2355   Sat Jul 31, 2021 2035 Spoke with neurology they will follow up on CTAs of the head and neck. [BP]   2135 Follow-up results from CTA of the head and neck. Moderate to severe stenosis of the M1 branch of the right MCA.    [BP]   2136 Findings consistent with acute on chronic CVA. Decision made to [place] patient [place]. Case discussed with hospitalist who agreed to admit patient. [BP]      ED Course User Index  [BP] Nela Aviles, DO        --------------------------------------------- PAST HISTORY ---------------------------------------------  Past Medical History:  has a past medical history of Bone marrow edema, DDD (degenerative disc disease), cervical, Diabetes mellitus ([address] Hyperglycemia due to diabetes mellitus ([facility] [address] Hyperlipidemia, Lumbar degenerative disc disease, Multiple lacunar infarcts ([address] Numbness and tingling, Postural dizziness, and UTI (urinary tract infection). Past Surgical History:  has a past surgical history that includes total nephrectomy (Left, 09/27/2016). Social History:  reports that she has been smoking cigarettes. She has a 30.00 pack-year smoking history. She has never used smokeless tobacco. She reports that she does not drink alcohol and does not use drugs. Family History: family history is not on file. The patients home medications have been reviewed.     Allergies: Codeine and Pyridium [phenazopyridine hcl]    -------------------------------------------------- RESULTS -------------------------------------------------    Lab  Results for orders placed or performed during the hospital encounter of 07/31/21   CBC Auto Differential   Result Value Ref Range    WBC 11.5 4.5 - 11.5 E9/L    RBC 4.48 3.50 - 5.50 E12/L    Hemoglobin 13.7 11.5 - 15.5 g/dL Hematocrit 41.0 34.0 - 48.0 %    MCV 91.5 80.0 - 99.9 fL    MCH 30.6 26.0 - 35.0 pg    MCHC 33.4 32.0 - 34.5 %    RDW 14.0 11.5 - 15.0 fL    Platelets 167 007 - 540 E9/L    MPV 9.6 7.0 - 12.0 fL    Neutrophils % 73.5 43.0 - 80.0 %    Immature Granulocytes % 0.2 0.0 - 5.0 %    Lymphocytes % 18.9 (L) 20.0 - 42.0 %    Monocytes % 5.5 2.0 - 12.0 %    Eosinophils % 1.6 0.0 - 6.0 %    Basophils % 0.3 0.0 - 2.0 %    Neutrophils Absolute 8.43 (H) 1.80 - 7.30 E9/L    Immature Granulocytes # 0.02 E9/L    Lymphocytes Absolute 2.17 1.50 - 4.00 E9/L    Monocytes Absolute 0.63 0.10 - 0.95 E9/L    Eosinophils Absolute 0.18 0.05 - 0.50 E9/L    Basophils Absolute 0.04 0.00 - 0.20 E9/L   Comprehensive Metabolic Panel w/ Reflex to MG   Result Value Ref Range    Sodium 140 132 - 146 mmol/L    Potassium reflex Magnesium 4.3 3.5 - 5.0 mmol/L    Chloride 105 98 - 107 mmol/L    CO2 25 22 - 29 mmol/L    Anion Gap 10 7 - 16 mmol/L    Glucose 142 (H) 74 - 99 mg/dL    BUN 18 6 - 23 mg/dL    CREATININE 1.0 0.5 - 1.0 mg/dL    GFR Non-African American 56 >=60 mL/min/1.73    GFR African American >60     Calcium 9.4 8.6 - 10.2 mg/dL    Total Protein 7.5 6.4 - 8.3 g/dL    Albumin 4.0 3.5 - 5.2 g/dL    Total Bilirubin 0.3 0.0 - 1.2 mg/dL    Alkaline Phosphatase 133 (H) 35 - 104 U/L    ALT 8 0 - 32 U/L    AST 10 0 - 31 U/L   Troponin   Result Value Ref Range    Troponin, High Sensitivity 28 (H) 0 - 9 ng/L   Lipase   Result Value Ref Range    Lipase 24 13 - 60 U/L   Lactic Acid, Plasma   Result Value Ref Range    Lactic Acid 1.2 0.5 - 2.2 mmol/L   Serum Drug Screen   Result Value Ref Range    Ethanol Lvl <10 mg/dL    Acetaminophen Level <5.0 (L) 10.0 - 63.9 mcg/mL    Salicylate, Serum <6.6 0.0 - 30.0 mg/dL    TCA Scrn NEGATIVE Cutoff:300 ng/mL   Protime-INR   Result Value Ref Range    Protime 11.3 9.3 - 12.4 sec    INR 1.0    APTT   Result Value Ref Range    aPTT 30.7 24.5 - 35.1 sec   POCT Glucose   Result Value Ref Range    Meter Glucose 135 (H) 74 - 99 mg/dL   EKG 12 Lead   Result Value Ref Range    Ventricular Rate 65 BPM    Atrial Rate 65 BPM    P-R Interval 146 ms    QRS Duration 84 ms    Q-T Interval 418 ms    QTc Calculation (Bazett) 434 ms    P Axis 48 degrees    R Axis 38 degrees    T Axis 33 degrees       Radiology  CTA HEAD W CONTRAST   Final Result   1. No perfusion mismatch. 2. Short segment moderate to severe stenosis in the right middle cerebral   artery M1 segment. Otherwise, no acute large vessel occlusion. 3. Unremarkable CTA of the neck. Critical results were called by Dr. Sergio Flores to [facility]   ER on 7/31/2021 at 21:04. CTA NECK W CONTRAST   Final Result   1. No perfusion mismatch. 2. Short segment moderate to severe stenosis in the right middle cerebral   artery M1 segment. Otherwise, no acute large vessel occlusion. 3. Unremarkable CTA of the neck. Critical results were called by Dr. Sergio Flores to [address] [place]   ER on 7/31/2021 at 21:04. CT BRAIN PERFUSION   Final Result   1. No perfusion mismatch. 2. Short segment moderate to severe stenosis in the right middle cerebral   artery M1 segment. Otherwise, no acute large vessel occlusion. 3. Unremarkable CTA of the neck. Critical results were called by Dr. Sergio Flores to [address] [place]   ER on 7/31/2021 at 21:04. XR CHEST PORTABLE   Final Result   No acute cardiopulmonary process. CT HEAD WO CONTRAST   Final Result   No acute intracranial abnormality.               ------------------------- NURSING NOTES AND VITALS REVIEWED ---------------------------  Date / Time Roomed:  7/31/2021  5:52 PM  ED Bed Assignment:  24/24    The nursing notes within the ED encounter and vital signs as below have been reviewed.    Patient Vitals for the past 24 hrs:   BP Temp Temp src Pulse Resp SpO2 Height Weight   07/31/21 2316 (!) 172/91 97.4 °F (36.3 °C) Axillary 64 16 99 % -- --   07/31/21 2204 (!) 182/96 -- -- 67 16 99 % -- --   07/31/21 2147 -- -- -- 61 15 99 % -- --   07/31/21 2049 (!) 179/99 -- -- 65 15 99 % 5' 4\" (1.626 m) 202 lb (91.6 kg)   07/31/21 2047 139/69 97.5 °F (36.4 °C) Axillary 61 16 95 % -- --   07/31/21 1734 -- -- -- 68 -- -- -- --   07/31/21 1727 (!) 206/88 97.9 °F (36.6 °C) Temporal 68 16 98 % -- --       Oxygen Saturation Interpretation: Normal      ------------------------------------------ PROGRESS NOTES ------------------------------------------  Re-evaluation(s):  Time:2105 . Patients symptoms show no change  Repeat physical examination is not changed    Time: 2135. Patients symptoms show no change  Repeat physical examination is not changed    I have spoken with the patient and discussed todays results, in addition to providing specific details for the plan of care and counseling regarding the diagnosis and prognosis. Their questions are answered at this time and they are agreeable with the plan. I have discussed the risks and benefits of transfer and they wish to proceed with the transfer. --------------------------------- ADDITIONAL PROVIDER NOTES ---------------------------------  Consultations:  Spoke with Hospitalist (Medicine). Discussed case. They will admit this patient. Reason for transfer: CVA need for neurology evaluation. This patient's ED course included: a personal history and physicial examination    This patient has remained hemodynamically stable during their ED course. Please note that the withdrawal or failure to initiate urgent interventions for this patient would likely result in a life threatening deterioration or permanent disability. Clinical Impression  1. Acute CVA (cerebrovascular accident) [facility])          Disposition  Patient's disposition: Transfer to [facility]. Transferred by: Griselda Pace. Patient's condition is stable.       Patient was seen and evaluated by myself and my attending  Assessment and Plan discussed with attending provider, please see attestation for final plan of care.      DO Gamaliel Vega,   Resident  08/01/21 [id number]

## 2021-07-31 NOTE — ED NOTES
FIRST PROVIDER CONTACT ASSESSMENT NOTE       Department of Emergency Medicine   7/31/21  5:38 PM EDT    Chief Complaint: Altered Mental Status (LKW 0630. left sided facial droop, garbled speech)    History of Present Illness: Mali Witt is a 57 y.o. female who presents to the ED for altered mental status and garbled speech that began around 0630 this morning. Per family, patient also has left sided facial droop. Family states patient began to complain about not feeling well at 0930 this morning. Focused Physical Exam:  VS:  BP (!) 206/88   Pulse 68   Temp 97.9 °F (36.6 °C) (Temporal)   Resp 16   SpO2 98%      General: Alert, lethargic   CVS: Regular rate for age, normal rhythm  Resp: Clear and equal bilaterally with good airflow, no respiratory distress    Medical History:  has a past medical history of Bone marrow edema, DDD (degenerative disc disease), cervical, Diabetes mellitus ([address] Hyperglycemia due to diabetes mellitus ([address] Hyperlipidemia, Lumbar degenerative disc disease, Multiple lacunar infarcts ([facility]), Numbness and tingling, Postural dizziness, and UTI (urinary tract infection). Surgical History:  has a past surgical history that includes total nephrectomy (Left, 09/27/2016). Social History:  reports that she has been smoking cigarettes. She has a 30.00 pack-year smoking history. She has never used smokeless tobacco. She reports that she does not drink alcohol and does not use drugs. Family History: family history is not on file.     *ALLERGIES*     Codeine and Pyridium [phenazopyridine hcl]     Initial Plan of Care:  Initiate Treatment-Testing, Proceed toTreatment Area When Bed Available for ED Attending/MLP to Continue Care    Patient immediately sent to CT    Charge nurse called and advised of possible malvin alert   Patient will be going to ED room after CT scan     -----------------END OF FIRST PROVIDER CONTACT ASSESSMENT NOTE--------------  Electronically signed by Leonid English JANI Knight   DD: 7/31/21         Francine Garrison PA-C  07/31/21 1742

## 2021-08-01 ENCOUNTER — HOSPITAL ENCOUNTER (INPATIENT)
Age: 61
LOS: 4 days | Discharge: HOME | DRG: 065 | End: 2021-08-05
Attending: INTERNAL MEDICINE | Admitting: INTERNAL MEDICINE
Payer: COMMERCIAL

## 2021-08-01 DIAGNOSIS — I63.9 ACUTE CVA (CEREBROVASCULAR ACCIDENT) (HCC): Primary | ICD-10-CM

## 2021-08-01 LAB
EKG ATRIAL RATE: 65 BPM
EKG P AXIS: 48 DEGREES
EKG P-R INTERVAL: 146 MS
EKG Q-T INTERVAL: 418 MS
EKG QRS DURATION: 84 MS
EKG QTC CALCULATION (BAZETT): 434 MS
EKG R AXIS: 38 DEGREES
EKG T AXIS: 33 DEGREES
EKG VENTRICULAR RATE: 65 BPM
METER GLUCOSE: 130 MG/DL (ref 74–99)
METER GLUCOSE: 141 MG/DL (ref 74–99)

## 2021-08-01 PROCEDURE — 6370000000 HC RX 637 (ALT 250 FOR IP): Performed by: INTERNAL MEDICINE

## 2021-08-01 PROCEDURE — 93010 ELECTROCARDIOGRAM REPORT: CPT | Performed by: INTERNAL MEDICINE

## 2021-08-01 PROCEDURE — 82962 GLUCOSE BLOOD TEST: CPT

## 2021-08-01 PROCEDURE — 99253 IP/OBS CNSLTJ NEW/EST LOW 45: CPT | Performed by: PSYCHIATRY & NEUROLOGY

## 2021-08-01 PROCEDURE — 2060000000 HC ICU INTERMEDIATE R&B

## 2021-08-01 PROCEDURE — 6360000002 HC RX W HCPCS: Performed by: INTERNAL MEDICINE

## 2021-08-01 PROCEDURE — 6370000000 HC RX 637 (ALT 250 FOR IP): Performed by: PSYCHIATRY & NEUROLOGY

## 2021-08-01 RX ORDER — DEXTROSE MONOHYDRATE 25 G/50ML
12.5 INJECTION, SOLUTION INTRAVENOUS PRN
Status: DISCONTINUED | OUTPATIENT
Start: 2021-08-01 | End: 2021-08-05 | Stop reason: HOSPADM

## 2021-08-01 RX ORDER — LORAZEPAM 2 MG/ML
0.5 INJECTION INTRAMUSCULAR EVERY 6 HOURS PRN
Status: DISCONTINUED | OUTPATIENT
Start: 2021-08-01 | End: 2021-08-03

## 2021-08-01 RX ORDER — DEXTROSE MONOHYDRATE 50 MG/ML
100 INJECTION, SOLUTION INTRAVENOUS PRN
Status: DISCONTINUED | OUTPATIENT
Start: 2021-08-01 | End: 2021-08-05 | Stop reason: HOSPADM

## 2021-08-01 RX ORDER — ACETAMINOPHEN 325 MG/1
650 TABLET ORAL EVERY 6 HOURS PRN
Status: DISCONTINUED | OUTPATIENT
Start: 2021-08-01 | End: 2021-08-05 | Stop reason: HOSPADM

## 2021-08-01 RX ORDER — CLOPIDOGREL BISULFATE 75 MG/1
75 TABLET ORAL DAILY
Status: DISCONTINUED | OUTPATIENT
Start: 2021-08-01 | End: 2021-08-05 | Stop reason: HOSPADM

## 2021-08-01 RX ORDER — ASPIRIN 81 MG/1
81 TABLET ORAL DAILY
Status: DISCONTINUED | OUTPATIENT
Start: 2021-08-01 | End: 2021-08-05 | Stop reason: HOSPADM

## 2021-08-01 RX ORDER — NICOTINE POLACRILEX 4 MG
15 LOZENGE BUCCAL PRN
Status: DISCONTINUED | OUTPATIENT
Start: 2021-08-01 | End: 2021-08-05 | Stop reason: HOSPADM

## 2021-08-01 RX ORDER — GLIPIZIDE 5 MG/1
5 TABLET ORAL
Status: DISCONTINUED | OUTPATIENT
Start: 2021-08-01 | End: 2021-08-05 | Stop reason: HOSPADM

## 2021-08-01 RX ORDER — ERGOCALCIFEROL 1.25 MG/1
50000 CAPSULE ORAL WEEKLY
Status: DISCONTINUED | OUTPATIENT
Start: 2021-08-02 | End: 2021-08-05 | Stop reason: HOSPADM

## 2021-08-01 RX ORDER — ATORVASTATIN CALCIUM 40 MG/1
40 TABLET, FILM COATED ORAL NIGHTLY
Status: DISCONTINUED | OUTPATIENT
Start: 2021-08-01 | End: 2021-08-05 | Stop reason: HOSPADM

## 2021-08-01 RX ADMIN — GLIPIZIDE 5 MG: 5 TABLET ORAL at 16:27

## 2021-08-01 RX ADMIN — LORAZEPAM 0.5 MG: 2 INJECTION INTRAMUSCULAR; INTRAVENOUS at 03:13

## 2021-08-01 RX ADMIN — CLOPIDOGREL BISULFATE 75 MG: 75 TABLET ORAL at 13:32

## 2021-08-01 RX ADMIN — GLIPIZIDE 5 MG: 5 TABLET ORAL at 06:06

## 2021-08-01 RX ADMIN — ATORVASTATIN CALCIUM 40 MG: 40 TABLET, FILM COATED ORAL at 20:33

## 2021-08-01 RX ADMIN — ASPIRIN 81 MG: 81 TABLET, COATED ORAL at 08:56

## 2021-08-01 ASSESSMENT — ENCOUNTER SYMPTOMS
COUGH: 0
EYE DISCHARGE: 0
DIARRHEA: 0
BACK PAIN: 0
VOMITING: 0
EYE PAIN: 0
SHORTNESS OF BREATH: 0
NAUSEA: 0
EYE REDNESS: 0
WHEEZING: 0
SORE THROAT: 0
ABDOMINAL DISTENTION: 0
SINUS PRESSURE: 0

## 2021-08-01 NOTE — PROGRESS NOTES
Pt with agitation  Pulling at IV lines/ heart monitor/ pulled off briefs  Dr. Marlene Cordoba contacted via [employer]  Ativan 0.5 mg q6 PRN ordered

## 2021-08-01 NOTE — CONSULTS
Travis Mann is a 57 y.o. female       No chief complaint on file. CC: AMS, stroke   HPI:  57year old woman with history of HTN and HLD, smoking presents  With garbled speech and left sided weakness. CT head shows chronic appearing RMCA territory stroke CTAs show severe stenosis in RM1 CTP was non diagnostic. Pt has had fluctuating mental status on the floor and was not participating in my exam consistently. No reported seizure activity. HPI  Prior to Visit Medications    Medication Sig Taking? Authorizing Provider   pravastatin (PRAVACHOL) 20 MG tablet Take 1 tablet by mouth daily  Geoff King DO   aspirin 81 MG EC tablet Take 81 mg by mouth daily  Historical Provider, MD   acetaminophen (TYLENOL) 325 MG tablet Take 650 mg by mouth every 6 hours as needed for Pain  Historical Provider, MD   vitamin D (ERGOCALCIFEROL) 1.25 MG (35316 UT) CAPS capsule Take 1 capsule by mouth once a week  Geoff King DO   glipiZIDE (GLUCOTROL) 5 MG tablet Take 1 tablet by mouth 2 times daily (before meals)  Geoff King DO     Social History     Tobacco Use    Smoking status: Current Every Day Smoker     Packs/day: 1.00     Years: 30.00     Pack years: 30.00     Types: Cigarettes    Smokeless tobacco: Never Used   Substance Use Topics    Alcohol use: No    Drug use: No     History reviewed. No pertinent family history. Past Surgical History:   Procedure Laterality Date    TOTAL NEPHRECTOMY Left 09/27/2016     Past Medical History:   Diagnosis Date    Bone marrow edema 3/25/2021    Abnl. C-spine MRI wo/mikel.  Patient refused MR C-spien with contrast, c/o out-of-pocket cost of other MRI's    DDD (degenerative disc disease), cervical 3/25/2021    Diabetes mellitus ([facility] [address]     Hyperglycemia due to diabetes mellitus ([address] 5/27/2021    Hyperlipidemia     Lumbar degenerative disc disease 3/15/2021    Multiple lacunar infarcts (HCC) 3/25/2021    Numbness and tingling 3/15/2021    Postural dizziness 3/15/2021    UTI (urinary tract infection)      Review of Systems  Unable to determine 2/2 mental status/ cooperation. Objective:   BP (!) 141/60   Pulse 74   Temp 97 °F (36.1 °C) (Temporal)   Resp 18   Ht 5' 5\" (1.651 m)   Wt 199 lb 9.6 oz (90.5 kg)   SpO2 97%   BMI 33.22 kg/m²     Physical Exam  Constitutional:       Appearance: She is not ill-appearing or diaphoretic. HENT:      Head: Normocephalic and atraumatic. Mouth/Throat:      Mouth: Mucous membranes are moist.      Pharynx: Oropharynx is clear. Eyes:      Extraocular Movements: Extraocular movements intact. Conjunctiva/sclera: Conjunctivae normal.      Pupils: Pupils are equal, round, and reactive to light. Cardiovascular:      Rate and Rhythm: Regular rhythm. Pulmonary:      Breath sounds: Normal breath sounds. Musculoskeletal:      Cervical back: Neck supple. Skin:     General: Skin is warm and dry. Neurological:      Mental Status: She is alert. Deep Tendon Reflexes:      Reflex Scores:       Tricep reflexes are 1+ on the right side and 1+ on the left side. Bicep reflexes are 1+ on the right side and 1+ on the left side. Patellar reflexes are 2+ on the right side and 2+ on the left side. Neurological Exam  Mental Status  Alert. Initially spoke with me states she fell. Followed simple commands. THen she stated she was tired and stopped participating. Speech was a whisper. .    Cranial Nerves  CN II: Visual fields full to confrontation. CN III, IV, VI: Extraocular movements intact bilaterally. Pupils equal round and reactive to light bilaterally. CN VII:  Left: There is central facial weakness. Asymmetric face. .    Motor    Resists movement in LE B/L vs spasticity. Hold arms antigravity briefly and symmetrically but then does not respond . Withdrawals both legs symmetrically.  .    Sensory  Reacts to stimulation in legs symmetrically and denies numbness,. .    Reflexes Right                      Left  Biceps                                 1+                         1+  Triceps                                1+                         1+  Patellar                                2+                         2+  Achilles                                Tr                         Tr    Coordination  Not participating . Laboratory/Radiology:     CBC with Differential:    Lab Results   Component Value Date    WBC 11.5 07/31/2021    RBC 4.48 07/31/2021    HGB 13.7 07/31/2021    HCT 41.0 07/31/2021     07/31/2021    MCV 91.5 07/31/2021    MCH 30.6 07/31/2021    MCHC 33.4 07/31/2021    RDW 14.0 07/31/2021    LYMPHOPCT 18.9 07/31/2021    MONOPCT 5.5 07/31/2021    BASOPCT 0.3 07/31/2021    MONOSABS 0.63 07/31/2021    LYMPHSABS 2.17 07/31/2021    EOSABS 0.18 07/31/2021    BASOSABS 0.04 07/31/2021     CMP:    Lab Results   Component Value Date     07/31/2021    K 4.3 07/31/2021     07/31/2021    CO2 25 07/31/2021    BUN 18 07/31/2021    CREATININE 1.0 07/31/2021    GFRAA >60 07/31/2021    LABGLOM 56 07/31/2021    GLUCOSE 142 07/31/2021    PROT 7.5 07/31/2021    LABALBU 4.0 07/31/2021    CALCIUM 9.4 07/31/2021    BILITOT 0.3 07/31/2021    ALKPHOS 133 07/31/2021    AST 10 07/31/2021    ALT 8 07/31/2021       CT head:  chronic appearing stroke in [facility]  Severe M1 stenosis . I independently reviewed the labs and imaging studies at today's appointment. Assessment:     RMCA stroke vs post ictal state. Plan:   DAPT high intensity statin   Follow up MRI brain   EEG.        Avinash Guillaume MD  1:06 PM  8/1/2021  ;

## 2021-08-01 NOTE — ED NOTES
Carla Killian for nurse to nurse report at [phone] for room assigned, 0 [facility],[address].      Kiara Petersen RN  07/31/21 [address]  Temitope Ortiz RN  07/31/21 8125

## 2021-08-02 ENCOUNTER — APPOINTMENT (OUTPATIENT)
Dept: NEUROLOGY | Age: 61
DRG: 065 | End: 2021-08-02
Attending: INTERNAL MEDICINE
Payer: COMMERCIAL

## 2021-08-02 ENCOUNTER — APPOINTMENT (OUTPATIENT)
Dept: MRI IMAGING | Age: 61
DRG: 065 | End: 2021-08-02
Attending: INTERNAL MEDICINE
Payer: COMMERCIAL

## 2021-08-02 LAB
METER GLUCOSE: 119 MG/DL (ref 74–99)
METER GLUCOSE: 124 MG/DL (ref 74–99)

## 2021-08-02 PROCEDURE — 6370000000 HC RX 637 (ALT 250 FOR IP): Performed by: INTERNAL MEDICINE

## 2021-08-02 PROCEDURE — 6370000000 HC RX 637 (ALT 250 FOR IP): Performed by: PSYCHIATRY & NEUROLOGY

## 2021-08-02 PROCEDURE — 70551 MRI BRAIN STEM W/O DYE: CPT

## 2021-08-02 PROCEDURE — 2060000000 HC ICU INTERMEDIATE R&B

## 2021-08-02 PROCEDURE — 97162 PT EVAL MOD COMPLEX 30 MIN: CPT

## 2021-08-02 PROCEDURE — 97535 SELF CARE MNGMENT TRAINING: CPT

## 2021-08-02 PROCEDURE — 97530 THERAPEUTIC ACTIVITIES: CPT

## 2021-08-02 PROCEDURE — 97165 OT EVAL LOW COMPLEX 30 MIN: CPT

## 2021-08-02 PROCEDURE — 95819 EEG AWAKE AND ASLEEP: CPT

## 2021-08-02 PROCEDURE — 95816 EEG AWAKE AND DROWSY: CPT | Performed by: PSYCHIATRY & NEUROLOGY

## 2021-08-02 PROCEDURE — 82962 GLUCOSE BLOOD TEST: CPT

## 2021-08-02 RX ADMIN — GLIPIZIDE 5 MG: 5 TABLET ORAL at 06:30

## 2021-08-02 RX ADMIN — ASPIRIN 81 MG: 81 TABLET, COATED ORAL at 08:48

## 2021-08-02 RX ADMIN — ERGOCALCIFEROL 50000 UNITS: 1.25 CAPSULE ORAL at 08:48

## 2021-08-02 RX ADMIN — GLIPIZIDE 5 MG: 5 TABLET ORAL at 17:10

## 2021-08-02 RX ADMIN — CLOPIDOGREL BISULFATE 75 MG: 75 TABLET ORAL at 08:48

## 2021-08-02 RX ADMIN — ATORVASTATIN CALCIUM 40 MG: 40 TABLET, FILM COATED ORAL at 20:28

## 2021-08-02 NOTE — PROGRESS NOTES
Called and spoke with MRI. Tech said they will try today at some point with pt medicated to get MRI done.

## 2021-08-02 NOTE — PROGRESS NOTES
OCCUPATIONAL THERAPY INITIAL EVALUATION     [address] [address] [facility]  [address] [place], [place]        Date:2021                                                  Patient Name: Mali Witt    MRN: [id number]    : 1960    Room: [address] [place] [address]      Evaluating OT: Gabriela Ritchie, [address] OTR/L; [id number]      Referring Provider: Ayden West DO    Specific Provider Orders/Date: OT Eval and Treat 21      Diagnosis: Acute CVA    Surgery: none     Pertinent Medical History: cancer of kidney (), Vasovagal syncope (), DM2, Hyperlipidemia, Neuropathy, Lumbar degenerative disc disease, hx of postural dizziness and neurologic gait dysfunction (), multiple lacunar infarcts (3/2021), DDD, total nephrectomy ()  Recommended Adaptive Equipment: TBD      Precautions:  Fall Risk, cognition, L visual field deficits     Assessment of current deficits    [x] Functional mobility  [x]ADLs  [x] Strength               [x]Cognition    [x] Functional transfers   [x] IADLs         [x] Safety Awareness   [x]Endurance    [x] Fine Coordination              [x] Balance      [x] Vision/perception   [x]Sensation     []Gross Motor Coordination  [] ROM  [] Delirium                   [] Motor Control     OT PLAN OF CARE   OT POC based on physician orders, patient diagnosis and results of clinical assessment     Frequency/Duration: 3-5 days/wk for 2 weeks PRN   Specific OT Treatment Interventions to include:   * Instruction/training on adapted ADL techniques and AE recommendations to increase functional independence within precautions       * Training on energy conservation strategies, correct breathing pattern and techniques to improve independence/tolerance for self-care routine  * Functional transfer/mobility training/DME recommendations for increased independence, safety, and fall prevention  * Patient/Family education to increase follow through with safety techniques and functional independence  * Recommendation of environmental modifications for increased safety with functional transfers/mobility and ADLs  * Cognitive retraining/development of therapeutic activities to improve problem solving, judgement, memory, and attention for increased safety/participation in ADL/IADL tasks  * Therapeutic exercise to improve motor endurance, ROM, and functional strength for ADLs/functional transfers  * Therapeutic activities to facilitate/challenge dynamic balance, stand tolerance for increased safety and independence with ADLs    Modified [place] Scale (MRS)  Score     Description  0             No symptoms  1             No significant disability despite symptoms  2             Slight disability; able to look after own affairs  3             Moderate disability; able to ambulate without assist/ requires assist with ADLs  4             Moderate/Severe disability;requires assist to ambulate/assist with ADLs  5             Severe disability;bedridden/incontinent   6               Score: 4      Home Living: Pt lives with wife in [facility], 1 story home, with 3 stairs and bilateral handrails to enter.    Bathroom setup: tub/shower unit with shower chair, safety frame over commode  Equipment owned: cane, shower chair, safety frame    Prior Level of Function: independent with ADLs , assist from wife with IADLs as needed; ambulated using SPC occasionally/as needed    Pain Level: 0/10  Cognition: A&O: 2/4 - oriented to self and location -reoriented to month/year (stated July and )   Follows single - mutli step directions   Memory: fair   Sequencing:  fair   Problem solving:  fair   Judgement/safety:  fair     Functional Assessment:  AM-PAC Daily Activity Raw Score: 16   Initial Eval Status  Date: 21 Treatment Status  Date: STGs = LTGs  Time frame: 10-14 days   Feeding Stand by Assist   Opening packages/tray set up  IND   Grooming Minimal Assist   Hand hygiene standing sink side Independent    UB Dressing Minimal Assist   Simulated EOB  Independent    LB Dressing Maximal Assist   Donning/doffing socks seated EOB  Posterior LOB during task   sequencing deficits  Minimal Assist    Bathing Moderate Assist  D/t sequencing deficit  And balance deficit  Minimal Assist    Toileting Moderate Assist  For safety d/t balance during pericare  Sequencing task  Stand by Assist    Bed Mobility  Log Roll: Min A  Supine to sit: Minimal Assist   Sit to supine: Minimal Assist   Supine to sit: Independent   Sit to supine: Independent    Functional Transfers Sit to stand:Min A   Stand to sit:Min A  Stand pivot: Min A  Commode: Min A  Sit to stand:IND   Stand to sit:IND  Stand pivot: IND  Commode: IND   Functional Mobility Min A with ww  To bathroom  Moderate [place] with ww/cane   Balance Sitting:     Static - SBA     Dynamic - Min A  Standing: Min A  Sitting:  Static: IND  Dynamic: IND  Standing: IND   Activity Tolerance Fair  Good   Visual/  Perceptual Glasses: yes - reading only    L side visual field deficits - difficulty managing room obstacles on left side during functional mobility from bathroom to bed                Hand Dominance   AROM (PROM) Strength Additional Info:  Goal:   RUE  WFL 4/5 good  and wfl FMC/dexterity noted during ADL tasks   Improve overall RUE strength to 4+/5 for participation in functional tasks       LUE [place] [place] 3+/5 Good  and wfl FMC/dexterity noted during ADL tasks   Improve overall LUE strength to 4/5 for participation in functional tasks         Hearing: [place]/[facility]   Sensation:  Chronic BLE neuropathy   Tone: WFL   Edema: unremarkable    Comment: Cleared by RN to see pt. Upon arrival patient lying supine in bed with HOB elevated, spouse and friend present in room. Pt agreeable to OT session. At end of session, patient lying supine in bed with call light and phone within reach, all lines and tubes intact.   Overall patient demonstrated decreased independence and safety during completion of ADL/functional transfer/mobility tasks. Pt would benefit from continued skilled OT to increase safety and independence with completion of ADL/IADL tasks for functional independence and quality of life. Treatment: Required re-orientation to year/month. Pt required vc's for proper technique/safety with hand placement/body mechanics/posture for bed mobility to for supine to sit. Pt sat EOB ~ 5 minutes to increase core strength/balance/actiivty tolerance for ease with ADLs. Pt required verbal cues and tactile cues when attempting to william/doff socks seated EOB. Pt required repeated verbal cues fo rhand placement EOB and feet placement on floor for postural stability during tasks. Pt completed functional mobility to bathroom utilizing ww d/t balance deficits and decreased insight into deficits. Required cueing for ww management/posture during functional mobility. Completed toileting task with Mod A to managing breif on/off and safety during pericare in seated position. Repeated verbal cues for safety with pivoting in bathroom using ww. Pt returned to bedside chair educated on safety and use of call light for fall prevention Pt required rest breaks during session and educated on pursed lip breathing. Pt appeared to have tolerated session well and appears motivated/cooperative/pleasant . Pt demo'ing fair understanding of education provided. Continue to educate. Rehab Potential: Good  for established goals     LTG: maximize independence with ADLs to return to PLOF    Patient and/or family were instructed on functional diagnosis, prognosis/goals and OT plan of care. Demonstrated fair understanding. [] Malnutrition indicators have been identified and nursing has been notified to ensure a dietitian consult is ordered.         Eval Complexity: MOD   · History: Expanded chart review of medical records and additional review of physical, cognitive, or psychosocial history related to current functional performance  · Exam: 3+ performance deficits  · Assistance/Modification: Mod assistance or modifications required to perform tasks. May have comorbidities that affect occupational performance. Evaluation time includes thorough review of current medical information, gathering information on past medical & social history & PLOF, completion of standardized testing, informal observation of tasks, consultation with other medical professions/disciplines, assessment of data & development of POC/goals. Time In: 1325  Time Out: 1355  Total Treatment Time: 15    Min Units   OT Eval Low 16958       OT Eval Medium 16224  x    OT Eval High 80694      OT Re-Eval [id number]       Therapeutic Ex 18224       Therapeutic Activities 11923       ADL/Self Care 19637  15  1   Orthotic Management 94263       Manual 81919     Neuro Re-Ed 34259       Non-Billable Time          Evaluation Time additionally includes thorough review of current medical information, gathering information on past medical history/social history and prior level of function, interpretation of standardized testing/informal observation of tasks, assessment of data and development of plan of care and goals.          AKANKSHA Garvin OTR/L; [id number]

## 2021-08-02 NOTE — PROGRESS NOTES
Physical Therapy  Physical Therapy Initial Assessment     Name: Oma Hermosillo  : 1960  MRN: [id number]      Date of Service: 2021    Evaluating PT:  Brittani Jain PT, DPT [id number]    Room #:  4332/4702-N  Diagnosis:  Acute CVA (cerebrovascular accident) [facility]) [I63.9]  PMHx/PSHx:  UTI, DM, HLD, LDDD, numbness and tingling, postural dizziness, C DDD, total nephrectomy 2016, tobacco abuse  Precautions:  Falls, cognition, L visual field deficits  Equipment Needs:  Front [employer]    SUBJECTIVE:    Pt lives with wife in a trailer home with 3 stairs to enter and 2 rail. Pt ambulated with SPC David PTA. Pt reports independence for all ADLs and mobility PTA. OBJECTIVE:   Initial Evaluation  Date: 2021 Treatment Short Term/ Long Term   Goals   AM-PAC 6 Clicks 73/12     Was pt agreeable to Eval/treatment? yes     Does pt have pain? No c/o pain     Bed Mobility  Rolling: Kirstie  Supine to sit: Kirstie  Sit to supine: Kirstie  Scooting: Kirstie  Rolling: Independent  Supine to sit: Independent  Sit to supine: Independent  Scooting: Independent   Transfers Sit to stand: Kirstie  Stand to sit: Kirstie  Stand pivot: Kirstie front [employer]  Sit to stand: Independent  Stand to sit: Independent  Stand pivot: David front [employer]   Ambulation    30 feet and 50 feet with front [employer] Kirstie  150 feet with front [employer] David   Stair negotiation: ascended and descended  4 steps with 1 rail Kirstie  4 steps with 1 rail David   ROM BUE:  Per OT note  BLE:  WNL     Strength BUE:  Per OT note  BLE:  WNL     Balance Sitting EOB:  SBA  Dynamic Standing:  Kirstie front [employer]  Sitting EOB:  Independent  Dynamic Standing:  David front [employer]     Pt is A & O x 2 (self, place, not oriented to time or situation)  Sensation:  Chronic neuropathy to BLE  Edema:  unremarkable    Patient education  Pt educated on role of PT intervention. Pt educated on safety in room with utilization of call light for assistance with mobility.   Pt educated on importance of maximizing OOB time by transferring to bedside chair for meals and ambulating to bathroom/transferring to bedside commode with assistance from nursing and therapy staff to increase functional activity tolerance and overall functional independence. Patient response to education:   Pt verbalized understanding Pt demonstrated skill Pt requires further education in this area   yes yes yes     ASSESSMENT:    Conditions Requiring Skilled Therapeutic Intervention:    [x]Decreased strength     [x]Decreased ROM  [x]Decreased functional mobility  [x]Decreased balance   [x]Decreased endurance   []Decreased posture  []Decreased sensation  []Decreased coordination   [x]Decreased vision  [x]Decreased safety awareness   []Increased pain       Comments:  RN cleared pt for activity prior to session. Pt received supine in bed and agreeable to PT intervention at this time. Pt performed all functional mobility as noted above. Pt presents with cognitive decline from baseline per family. Physical function appears to be near baseline. Also presenting with L sided visual field deficits. Constant cueing for safety required throughout session. Pt returned to seated in chair at end of session and left with all needs met and call light in reach. Pt requires continued skilled PT intervention for the purposes of maximizing functional mobility and independence by addressing deficits described above. Treatment:  Patient practiced and was instructed in the following treatment:     Therapeutic Activities Completed:  o Functional mobility as noted above:   - Bed mobility: as noted above. Max VC and hand over hand guidance to facilitate efficient use of BUE on bed rail to promote more independent completion of task. - Transfer training: Kirstie all aspects. Max VC for proper sequencing and hand placement for safety.    - Ambulation: 30 feet to bathroom and then 50 feet x 2 reps in hallway with front [employer] Kirstie. Cues throughout for proper sequencing for safety.    - 4steps with 2 rails Kirstie. Cues throughout for NR patterning for safety.   o Skilled repositioning as noted above.  o Pt education as noted above. Pt's/ family goals   1. Return home. Prognosis is fair for reaching above PT goals. Patient and or family understand(s) diagnosis, prognosis, and plan of care. yes    PHYSICAL THERAPY PLAN OF CARE:    PT POC is established based on physician order and patient diagnosis     Referring provider/PT Order:    08/02/21 1130  PT eval and treat Start: 08/02/21 1130, End: 08/02/21 1130, ONE TIME, Standing Count: 1 Occurrences, R      Gaston Soares,      Diagnosis:  Acute CVA (cerebrovascular accident) [facility]) [I63.9]  Specific instructions for next treatment:  Ambulate as tolerated. Dynamic balance activities. Current Treatment Recommendations:     [x] Strengthening to improve independence with functional mobility   [x] ROM to improve independence with functional mobility   [x] Balance Training to improve static/dynamic balance and to reduce fall risk  [x] Endurance Training to improve activity tolerance during functional mobility   [x] Transfer Training to improve safety and independence with all functional transfers   [x] Gait Training to improve gait mechanics, endurance and assess need for appropriate assistive device  [x] Stair Training in preparation for safe discharge home and/or into the community   [x] Positioning to prevent skin breakdown and contractures  [x] Safety and Education Training   [x] Patient/Caregiver Education   [] HEP  [] Other     PT long term treatment goals are located in above grid    Frequency of treatments: 2-5x/week x 1-2 weeks.     Time in  1325  Time out  1355    Total Treatment Time  15 minutes     Evaluation Time includes thorough review of current medical information, gathering information on past medical history/social history and prior level of function, completion of standardized testing/informal observation of tasks, assessment of data and education on plan of care and goals.     CPT codes:  [] Low Complexity PT evaluation 84848  [x] Moderate Complexity PT evaluation 78047  [] High Complexity PT evaluation 06328  [] PT Re-evaluation 01151  [] Gait training 53298 0 minutes  [] Manual therapy 80430 0 minutes  [x] Therapeutic activities 37810 15 minutes  [] Therapeutic exercises 91820 0 minutes  [] Neuromuscular reeducation 85531 0 minutes     Roselyn Nicole, PT, DPT  [id number]

## 2021-08-02 NOTE — PROCEDURES
[address] Report    MRN: [id number]   PATIENT NAME: Cortes Denise   DATE OF REPORT: 2021    DATE OF SERVICE: 2021    PHYSICIAN NAME: Sherwin Arriola DO  Referring Physician: Annelise ABBASI      Patient's : 1960   Patient's Age: 57 y.o. Gender: female     PROCEDURE: Routine EEG with video      Clinical Interpretation: This abnormal study showed evidence of:    1. Moderate nonspecific cerebral dysfunction of the right frontotemporal region  2. Mild nonspecific cerebral dysfunction of the left frontotemporal region    Structural abnormalities should be considered for the findings above and appropriate imaging obtained if clinically indicated. No seizures or epileptiform discharges were noted during this study. ____________________________  Electronically signed by: Sherwin Arriola DO, 2021 3:28 PM      Patient Clinical Information   Reason for Study: Patient undergoing evaluation for fluctuating mental status  Patient State: Awake  Primary neurological diagnosis: Altered mental status   Primary indication for monitoring: Diagnosis of nonconvulsive seizures    Pertinent Medications and Treatments  None    Sedatives administered: No  Intubated: No  Pharmacological paralytic: No    Reporting Period  Start of Study: , 2021   End of Study:  , 2021       EEG Description  Digital video and scalp EEG monitoring was performed using the standard protocol for this laboratory. Scalp electrodes were applied in the international 10/20 system. Multiple digital montage arrangements were utilized for evaluation. EKG and video were recorded.      Background:      Occipital rhythm (posterior dominant rhythm or PDR): Present   Frequency: 10 Hz  Voltage: Medium   Organization: fair   Reactivity to eye opening/closure: fair    Drowsiness: Present - normal  Sleep: Absent    Technical and Activation Procedures:  Hyperventilation: Not done Photic stimulation: Not done        Reactivity to stimulation: Yes    Abnormalities:    I. Seizures? No    II. Rhythmic or Periodic Patterns? No    III. Other Abnormalities?         Occasional irregular delta activity noted at Fp2, F4, F8, T4 with a shifting field  Rare irregular delta activity noted at Fp2, F4, F8, T4

## 2021-08-02 NOTE — CARE COORDINATION
Met with pt, spouse Juliet, and friend at the bedside. Pt resting information obtained from pt's spouse. Role of  and transition of care. Pt lives with her spouse in a trailer with 3 steps and handrails to enter. PTA pt drove, occasionally used at cane and has a glucometer. They have a walker, SC and BSC at the home if needed. Per Juliet anticipate pt will need LIAM at discharge. She is unsure of a facility and would like to review list prior to making a decision. Encouraged her to have 2-3 choices and let CM/SW know ASAP of choices. Freedom of choice list provided. PCP Dr Savanna Guerra. Pharmacy [facility] on [address]  The Plan for Transition of Care is related to the following treatment goals: LIAM    The Patient and/or patient representative  was provided with a choice of provider and agrees   with the discharge plan. [x] Yes [] No    Freedom of choice list was provided with basic dialogue that supports the patient's individualized plan of care/goals, treatment preferences and shares the quality data associated with the providers.  [x] Yes [] No

## 2021-08-03 LAB — METER GLUCOSE: 124 MG/DL (ref 74–99)

## 2021-08-03 PROCEDURE — 2060000000 HC ICU INTERMEDIATE R&B

## 2021-08-03 PROCEDURE — 82962 GLUCOSE BLOOD TEST: CPT

## 2021-08-03 PROCEDURE — 97535 SELF CARE MNGMENT TRAINING: CPT

## 2021-08-03 PROCEDURE — 6370000000 HC RX 637 (ALT 250 FOR IP): Performed by: PSYCHIATRY & NEUROLOGY

## 2021-08-03 PROCEDURE — 97530 THERAPEUTIC ACTIVITIES: CPT

## 2021-08-03 PROCEDURE — 6370000000 HC RX 637 (ALT 250 FOR IP): Performed by: INTERNAL MEDICINE

## 2021-08-03 PROCEDURE — 99233 SBSQ HOSP IP/OBS HIGH 50: CPT | Performed by: NURSE PRACTITIONER

## 2021-08-03 PROCEDURE — 92523 SPEECH SOUND LANG COMPREHEN: CPT

## 2021-08-03 RX ADMIN — ASPIRIN 81 MG: 81 TABLET, COATED ORAL at 08:16

## 2021-08-03 RX ADMIN — GLIPIZIDE 5 MG: 5 TABLET ORAL at 16:56

## 2021-08-03 RX ADMIN — ATORVASTATIN CALCIUM 40 MG: 40 TABLET, FILM COATED ORAL at 20:50

## 2021-08-03 RX ADMIN — GLIPIZIDE 5 MG: 5 TABLET ORAL at 06:40

## 2021-08-03 RX ADMIN — CLOPIDOGREL BISULFATE 75 MG: 75 TABLET ORAL at 08:16

## 2021-08-03 ASSESSMENT — PAIN SCALES - GENERAL
PAINLEVEL_OUTOF10: 0
PAINLEVEL_OUTOF10: 0

## 2021-08-03 NOTE — PROGRESS NOTES
Tiffanie Ravi is a 57 y.o. right handed female     Neuro is following for stroke    PMH: DDD, diabetes, previous lacunar strokes, HTN, HLD left nephrectomy, smoking    Synopsis:  She presented with garbled speech and left-sided weakness. Imaging showed chronic right MCA territory stroke and severe stenosis in her right M1perfusion imaging was nondiagnostic. Her altered mental status during admission concerning for postictal state    MRI of the brain showed acute bilateral thalamic infarctions--right greater than left. She is very drowsy, with diffuse weakness worse on the left, but is able to follow commands and is oriented. EEG showed no seizures. No A. fib on telemetry.     There is no family present and she is otherwise medically stable    Current Facility-Administered Medications   Medication Dose Route Frequency Provider Last Rate Last Admin    vitamin D (ERGOCALCIFEROL) capsule 50,000 Units  50,000 Units Oral Weekly Daniel Sanchez MD   50,000 Units at 08/02/21 0848    glipiZIDE (GLUCOTROL) tablet 5 mg  5 mg Oral BID AC Daniel Sanchez MD   5 mg at 08/03/21 0640    aspirin EC tablet 81 mg  81 mg Oral Daily Daniel Sanchez MD   81 mg at 08/03/21 0816    acetaminophen (TYLENOL) tablet 650 mg  650 mg Oral Q6H PRN Daniel Sanchez MD        atorvastatin (LIPITOR) tablet 40 mg  40 mg Oral Nightly Daniel Sanchez MD   40 mg at 08/02/21 2028    glucose (GLUTOSE) 40 % oral gel 15 g  15 g Oral PRN Daniel Sanchez MD        dextrose 50 % IV solution  12.5 g Intravenous PRN Daniel Sanchez MD        glucagon (rDNA) injection 1 mg  1 mg Intramuscular PRN Daniel Sanchez MD        dextrose 5 % solution  100 mL/hr Intravenous PRN Daniel Sanchez MD        LORazepam (ATIVAN) injection 0.5 mg  0.5 mg Intravenous Q6H PRN Daniel Sanchez MD   0.5 mg at 08/01/21 0313    clopidogrel (PLAVIX) tablet 75 mg  75 mg Oral Daily Chichi Montero MD   75 mg at 08/03/21 0816     Objective:     /78   Pulse 74   Temp 97.3 °F (36.3 °C) (Temporal)   Resp 16   Ht 5' 5\" (1.651 m)   Wt 199 lb 9.6 oz (90.5 kg)   SpO2 98%   BMI 33.22 kg/m²     General appearance: Lethargic, appears stated age, in no acute distress in bed  Head: normocephalic, without obvious abnormality, atraumatic  Eyes: conjunctivae/corneas clear.  .  Neck: No carotid bruit, full range of motion  Lungs: clear to auscultation bilaterally  Heart: regular rate and rhythm--NSR  Extremities: normal, atraumatic, no cyanosis or edema  Pulses: 2+ and symmetric  Skin: color, texture, turgor normal---no rashes or lesions      Mental Status: Lethargic, oriented x4--falls back to sleep quickly during exam    Follows all simple commands well with some cueing    Speech: Dysarthric  Language: Laconic; no aphasias    Cranial Nerves:  I: smell NA   II: visual acuity  NA   II: visual fields Full to confrontation   II: pupils EASTON   III,VII: ptosis B/l R>L   III,IV,VI: extraocular muscles   difficulty with vertical upgaze and left gaze, but crosses midline   V: mastication Normal   V: facial light touch sensation  Normal   V,VII: corneal reflex     VII: facial muscle function - upper  Normal   VII: facial muscle function - lower  left seventh upper motor neuron paresis   VIII: hearing Normal   IX: soft palate elevation  Normal   IX,X: gag reflex    XI: trapezius strength  5/5   XI: sternocleidomastoid strength 5/5   XI: neck extension strength  5/5   XII: tongue strength  Normal     Motor:  Diffuse weakness; worse on the left--4/5 L arm with +drift  Normal bulk and tone  No abnormal movements    Sensory:  LT and PP normal in all limbs    Coordination:   FFN and FN slower on L relative to weakness      DTR:   Right Brachioradialis reflex 2+  Left Brachioradialis reflex 2+  Right Biceps reflex 2+  Left Biceps reflex 2+  Right Quadriceps reflex 2+  Left Quadriceps reflex 2+  Right Achilles reflex 1+  Left Achilles dysfunction of the left frontotemporal region   Structural abnormalities should be considered for the findings above and appropriate imaging obtained if clinically indicated. No seizures or epileptiform discharges were noted during this study    All labs and images personally reviewed today    Assessment:     Acute bilateral thalamic strokes: suspect occlusion of the artery of [place]--possibly secondary to embolic disease. No other significant stenosis in her posterior circulation. Her lethargy and AMS are secondary to damage to parts of the reticular activating system that run through the thalamus--and she also displays diffuse weakness, worse on the L. She requires additional workup for cardioembolic disease. She is not having seizures     Previous R MCA stroke: due to mod severe stenosis of short segment of her RM1--also concerning for embolic disease.      Plan:     Echo w bubble--if neg, may need additional cardioembolic workup as OP    Continue DAPT for 21 days then reduce to Plavix monotherapy    Continue high intensity statin--goal LDL <70    PT/OT/ST    Updated patient's wife, Martita Cornell, via phone    Will follow    CORINA Hernández CNP  11:13 AM  8/3/2021

## 2021-08-03 NOTE — PROGRESS NOTES
Hospital Medicine    Subjective:  Pt seen this am / more alert      Current Facility-Administered Medications:     vitamin D (ERGOCALCIFEROL) capsule 50,000 Units, 50,000 Units, Oral, Weekly, Dutch Hanson MD, 50,000 Units at 08/02/21 0848    glipiZIDE (GLUCOTROL) tablet 5 mg, 5 mg, Oral, BID AC, Dutch Hanson MD, 5 mg at 08/03/21 0640    aspirin EC tablet 81 mg, 81 mg, Oral, Daily, Dutch Hanson MD, 81 mg at 08/03/21 0816    acetaminophen (TYLENOL) tablet 650 mg, 650 mg, Oral, Q6H PRN, Dutch Hanson MD    atorvastatin (LIPITOR) tablet 40 mg, 40 mg, Oral, Nightly, Dutch Hanson MD, 40 mg at 08/02/21 2028    glucose (GLUTOSE) 40 % oral gel 15 g, 15 g, Oral, PRN, Dutch Hanson MD    dextrose 50 % IV solution, 12.5 g, Intravenous, PRN, Dutch Hanson MD    glucagon (rDNA) injection 1 mg, 1 mg, Intramuscular, PRN, Dutch Hanson MD    dextrose 5 % solution, 100 mL/hr, Intravenous, PRN, Dutch Hanson MD    clopidogrel (PLAVIX) tablet 75 mg, 75 mg, Oral, Daily, Domenick Severe, MD, 75 mg at 08/03/21 0816    Objective:    /78   Pulse 74   Temp 97.3 °F (36.3 °C) (Temporal)   Resp 16   Ht 5' 5\" (1.651 m)   Wt 199 lb 9.6 oz (90.5 kg)   SpO2 98%   BMI 33.22 kg/m²     Heart:  reg  Lungs:  ctab  Abd: + bs soft nontender  Extrem:  Moves arms and legs    CBC with Differential:    Lab Results   Component Value Date    WBC 11.5 07/31/2021    RBC 4.48 07/31/2021    HGB 13.7 07/31/2021    HCT 41.0 07/31/2021     07/31/2021    MCV 91.5 07/31/2021    MCH 30.6 07/31/2021    MCHC 33.4 07/31/2021    RDW 14.0 07/31/2021    LYMPHOPCT 18.9 07/31/2021    MONOPCT 5.5 07/31/2021    BASOPCT 0.3 07/31/2021    MONOSABS 0.63 07/31/2021    LYMPHSABS 2.17 07/31/2021    EOSABS 0.18 07/31/2021    BASOSABS 0.04 07/31/2021     CMP:    Lab Results   Component Value Date     07/31/2021    K 4.3 07/31/2021     07/31/2021    CO2 25 07/31/2021 BUN 18 07/31/2021    CREATININE 1.0 07/31/2021    GFRAA >60 07/31/2021    LABGLOM 56 07/31/2021    GLUCOSE 142 07/31/2021    PROT 7.5 07/31/2021    LABALBU 4.0 07/31/2021    CALCIUM 9.4 07/31/2021    BILITOT 0.3 07/31/2021    ALKPHOS 133 07/31/2021    AST 10 07/31/2021    ALT 8 07/31/2021     Warfarin PT/INR:    Lab Results   Component Value Date    INR 1.0 07/31/2021    INR 1.0 10/07/2020    PROTIME 11.3 07/31/2021    PROTIME 11.5 10/07/2020       Assessment:    Principal Problem:    Acute CVA (cerebrovascular accident) [facility])  Active Problems:    Type 2 diabetes mellitus without complication, without long-term current use of insulin ([address]  Resolved Problems:    * No resolved hospital problems.  *      Plan:  Echo / cont as per neuro / Rosezetta Najjar, DO  12:38 PM  8/3/2021

## 2021-08-03 NOTE — PROGRESS NOTES
Physical Therapy  Treatment Note     Name: Emily Benitez  : 1960  MRN: [id number]      Date of Service: 8/3/2021    Evaluating PT:  Brandon Up, PT, DPT [id number]    Room #:  1577/4066-A  Diagnosis:  Acute CVA (cerebrovascular accident) [facility]) [I63.9]  PMHx/PSHx:  UTI, DM, HLD, LDDD, numbness and tingling, postural dizziness, C DDD, total nephrectomy 2016, tobacco abuse  Precautions:  Falls, cognition, L visual field deficits  Equipment Needs:  SPC    SUBJECTIVE:    Pt lives with wife in a trailer home with 3 stairs to enter and 2 rail. Pt ambulated with SPC David PTA. Pt reports independence for all ADLs and mobility PTA. OBJECTIVE:   Initial Evaluation  Date: 2021 Treatment  Date: 8/3/2021 Short Term/ Long Term   Goals   AM-PAC 6 Clicks /    Was pt agreeable to Eval/treatment? yes yes    Does pt have pain? No c/o pain No c/o pain    Bed Mobility  Rolling: Kirstie  Supine to sit: Kirstie  Sit to supine: Kirstie  Scooting: Kirstie SBA all aspects Rolling: Independent  Supine to sit: Independent  Sit to supine: Independent  Scooting: Independent   Transfers Sit to stand: Kirstie  Stand to sit: Kirstie  Stand pivot: Kirstie front [employer] Sit<>stand: SBA  Stand pivot: SBA front [employer] and SPC Sit to stand: Independent  Stand to sit: Independent  Stand pivot: David front [employer]   Ambulation    30 feet and 50 feet with front [employer] Kirstie 75 feet SPC  feet with front [employer] David   Stair negotiation: ascended and descended  4 steps with 1 rail Kirstie 4 steps with 1 rail and SPC SBA 4 steps with 1 rail David   ROM BUE:  Per OT note  BLE:  WNL     Strength BUE:  Per OT note  BLE:  WNL     Balance Sitting EOB:  SBA  Dynamic Standing:  Kirstie front [employer] Sitting EOB: SBA  Dynamic standing: SBA front [employer] Sitting EOB:  Independent  Dynamic Standing:  David front [employer]     Pt is A & O x 4. Flat affect. Sensation:  Pt denies numbness and tingling to extremities  Edema:  unremarkable    Patient education  Pt educated on role of PT intervention.   Pt educated on safety in room with utilization of call light for assistance with mobility. Pt educated on importance of maximizing OOB time by transferring to bedside chair for meals and ambulating to bathroom/transferring to bedside commode with assistance from nursing and therapy staff to increase functional activity tolerance and overall functional independence. Educated on [address] with [address] for safety. Patient response to education:   Pt verbalized understanding Pt demonstrated skill Pt requires further education in this area   yes yes yes     ASSESSMENT:    Comments:  RN cleared pt for activity prior to session. Pt received supine in bed and agreeable to PT intervention with OT collaboration at this time. Pt performed all functional mobility as noted above. Pt cognition appears improved this date but she continues to have some L sided inattention and safety awareness deficits. Functionally she is moving well but I recommend pt have 24/ supervision d/t safety concerns. Pt returned to seated in bedside chair at end of session and left with all needs met and call light in reach. Pt requires continued skilled PT intervention for the purposes of maximizing functional mobility and independence by addressing deficits described above. Treatment:  Patient practiced and was instructed in the following treatment:     Therapeutic Activities Completed:  o Functional mobility as noted above:   - Bed mobility: SBA all aspects.   - Transfer training: SBA all aspects. Trialled [address] as is pt's preference. She required mod VC for safe and proper sequencing but completed without need for physical assistance. - Ambulation: 75 feet SPC SBA x 2 reps. Min VC for proper sequencing for safety.    - Stair Trainin steps with 1 rail SBA. Cues for NR patterning and proper sequencing with SPC. o Skilled repositioning in seated position for comfort.  o Pt education as noted above.     PLAN:    Patient is making fair progress towards established goals. Will continue with current POC.       Time in  0930  Time out  0945    Total Treatment Time  15 minutes     CPT codes:  [] Gait training 29230 0 minutes  [] Manual therapy 57612 0 minutes  [x] Therapeutic activities 15649 15 minutes  [] Therapeutic exercises 95405 0 minutes  [] Neuromuscular reeducation 83036 0 minutes    Ether Lev, PT, DPT  [id number]

## 2021-08-03 NOTE — CARE COORDINATION
Received call from [place] at [facility], they have assigned consult to Dr Hall Fairly.

## 2021-08-03 NOTE — CARE COORDINATION
TODD spoke with patients spouse, Jonelle Spain via phone. We discussed transition of care, possible acute rehab vs home. She is agreeable to [facility] acute rehab, she states if they can not accept, she does not want [place] because it is too far away for her and she doesn't drive. She states if not able to go to [facility] acute they will take her home and do outpatient therapy. Have called consult to PM and R [phone], left message.

## 2021-08-03 NOTE — PROGRESS NOTES
SPEECH/LANGUAGE PATHOLOGY  SPEECH/LANGUAGE/COGNITIVE EVALUATION   and PLAN OF CARE      PATIENT NAME:  Cheli Cisneros  (female)     MRN:  [id number]    :  1960  (57 y.o.)  STATUS:  Inpatient: Room 8501/8501-A    TODAY'S DATE:  8/3/2021  REFERRING PROVIDER:    Dr. Gibbs Mart: SLP cognitive language evaluation  Date of order:  21  REASON FOR REFERRAL:  CVA  EVALUATING THERAPIST: LIVE Jackson    ADMITTING DIAGNOSIS: Acute CVA (cerebrovascular accident) [facility]) [I63.9]    VISIT DIAGNOSIS:      SPEECH THERAPY  PLAN OF CARE   The speech therapy  POC is established based on physician order, speech pathology diagnosis and results of clinical assessment     SPEECH PATHOLOGY DIAGNOSIS:    Mild dysarthria, Mild-moderate cognitive deficits    Speech Pathology intervention is recommended 3-6 times per week for LOS or when goals are met with emphasis on the following:      Conditions Requiring Skilled Therapeutic Intervention for speech, language and/or cognition    Dysarthria   Decreased short term memory  Decreased thought organization    Specific Speech Therapy Interventions to Include: Therapeutic exercises  Therapeutic tasks for Cognition    Specific instructions for next treatment:      To initiate memory tasks  To initiate speech production tasks    SHORT/LONG TERM GOALS  Pt will improve immediate, short term, recent memory during structured and unstructured tasks with 75% accuracy   Pt will improve problem solving/thought organization during structured and unstructured tasks with 75% accuracy   Pt will improve speech intelligibility and increased articulatory precision via compensatory strategies and oral motor exercises     Patient goals: Patient/family involved in developing goals and treatment plan:   Treatment goals discussed with Patient    The Patient understand(s) the diagnosis, prognosis and plan of care   The patient/family Agreed with above,     This plan may be re-evaluated and revised as warranted. Rehabilitation Potential/Prognosis: good                CLINICAL ASSESSMENT:  MOTOR SPEECH       Oral Peripheral Examination   Left labiobuccal weakness and Left lingual deviation     Parameters of Speech Production  Respiration:  Adequate for speech production  Articulation:  Distortion  Resonance:  Within functional limits  Quality:   Within functional limits  Pitch: Within functional limits  Intensity: Within functional limits  Fluency:  Intact  Prosody Monotone    RECEPTIVE LANGUAGE    Comprehension of Yes/No Questions: Within functional limits    Process  Simple Verbal Commands:   Within functional limits  Process Intermediate Verbal Commands:   Within functional limits  Process Complex Verbal Commands:     Incomplete    Comprehension of Conversation:      Within functional limits      EXPRESSIVE LANGUAGE     Serials: Functional    Imitation:  Words   Functional   Sentences Functional    Naming:  (Modality used:  Verbal)  Confrontation Naming  Functional  Functional Description  Functional  Response Naming: Functional    Conversation:      Confusion was noted during conversation    COGNITION     Attention/Orientation  Attention: Easily Distracted  Orientation:  Oriented to Person, Place, Reason for hospitalization    Memory   Immediate Recall: Repeated 3/3    Delayed Recall:   Recalled 1/3     Long Term Recall:   Recalled Address, Birthdate, Age and Family    Organization/Problem Solving/Reasoning   Verbal Sequencing:   Impaired        Verbal Problem solving:   Impaired          CLINICAL OBSERVATIONS NOTED DURING THE EVALUATION  Latent responses                  EDUCATION:   The Speech Language Pathologist (SLP) completed education regarding results of evaluation and that intervention is warranted at this time.   Learner: Patient  Education: Reviewed results and recommendations of this evaluation  Evaluation of Education:  Verbalizes understanding    Evaluation Time includes thorough review of current medical information, gathering information on past medical history/social history and prior level of function, completion of standardized testing/informal observation of tasks, assessment of data and education on plan of care and goals. CPT code:    29076  eval speech sound lang comprehension      The admitting diagnosis and active problem list, as listed below have been reviewed prior to initiation of this evaluation.         ACTIVE PROBLEM LIST:   Patient Active Problem List   Diagnosis    Cancer of kidney ([facility]    Vasovagal syncope    Type 2 diabetes mellitus without complication, without long-term current use of insulin ([facility])    Vitamin D deficiency    Mixed hyperlipidemia    Neuropathy    Light headedness    Neurologic gait dysfunction    Lumbar degenerative disc disease    Numbness and tingling    Postural dizziness    Multiple lacunar infarcts ([address]    DDD (degenerative disc disease), cervical    Bone marrow edema    Hyperglycemia due to diabetes mellitus ([address]    Cerebral infarct ([address]    Acute CVA (cerebrovascular accident) ([address]

## 2021-08-03 NOTE — PROGRESS NOTES
follow through with safety techniques and functional independence  * Recommendation of environmental modifications for increased safety with functional transfers/mobility and ADLs  * Cognitive retraining/development of therapeutic activities to improve problem solving, judgement, memory, and attention for increased safety/participation in ADL/IADL tasks  * Therapeutic exercise to improve motor endurance, ROM, and functional strength for ADLs/functional transfers  * Therapeutic activities to facilitate/challenge dynamic balance, stand tolerance for increased safety and independence with ADLs     Modified [place] Scale (MRS)  Score     Description  0             No symptoms  1             No significant disability despite symptoms  2             Slight disability; able to look after own affairs  3             Moderate disability; able to ambulate without assist/ requires assist with ADLs  4             Moderate/Severe disability;requires assist to ambulate/assist with ADLs  5             Severe disability;bedridden/incontinent   6               Score: 4     Home Living: Pt lives with wife in [facility], [address] home, with 3 stairs and bilateral handrails to enter.    Bathroom setup: tub/shower unit with shower chair, safety frame over commode  Equipment owned: cane, shower chair, safety frame     Prior Level of Function: independent with ADLs , assist from wife with IADLs as needed; ambulated using SPC occasionally/as needed     Pain Level: denied any pain   Cognition: A&O: 2/4 - oriented to self and location -reoriented to month/year (stated July and )   Follows 1-2 step directions, pleasant & cooperative               Memory: fair              Sequencing:  fair              Problem solving:  fair              Judgement/safety:  fair-                Functional Assessment:  AM-PAC Daily Activity Raw Score: 16/24    Initial Eval Status  Date: 21 Treatment Status  Date:  8/3/21 STGs = LTGs  Time frame: 10-14 days   Feeding Stand by Assist   Opening packages/tray set up  Mod Indep  Encouraged pt to sit up in bed IND   Grooming Minimal Assist   Hand hygiene standing sink side CGA  Standing at sink washing hands & face  Independent    UB Dressing Minimal Assist   Simulated EOB  CGA  Donning 2nd gown as a robe while standing Independent    LB Dressing Maximal Assist   Donning/doffing socks seated EOB  Posterior LOB during task   sequencing deficits Min A  Doff/donning brief seated on commode, assist to thread over feet with pt then able to william over hips  Goal Update 8/4  Independent   Bathing Moderate Assist  D/t sequencing deficit  And balance deficit  Min A  Simulated task  Goal Update 8/4  Independent   Toileting Moderate Assist  For safety d/t balance during pericare  Sequencing task  Min A  Pt able to complete hygiene & manage brief over hips  Goal Update 8/4  Independent   Bed Mobility  Log Roll: Min A  Supine to sit: Minimal Assist   Sit to supine: Minimal Assist   SBA  Supine to sit  Supine to sit:  Independent   Sit to supine: Independent    Functional Transfers Sit to stand:Min A   Stand to sit:Min A  Stand pivot: Min A  Commode: Min A Min A  Cues for hand placement & safety   Sit to stand:IND   Stand to sit:IND  Stand pivot: IND  Commode: IND   Functional Mobility Min A with ww  To bathroom  Min A  Using walker from bed to bathroom with PT using SC in hallway  (pt using cane at home, defer to PT with recommendation of cane)   Moderate [place] with ww/cane   Balance Sitting:     Static - SBA     Dynamic - Min A  Standing: Min A Sitting: SBA  Standing: CGA  With walker   Sitting:  Static: IND  Dynamic: IND  Standing: IND   Activity Tolerance Fair  Fair  Good   Visual/  Perceptual Glasses: yes - reading only     L side visual field deficits - difficulty managing room obstacles on left side during functional mobility from bathroom to bed    L inattention/neglect  Pt walked past her room which was on her left walking back towards room    Pt able read name tag & clock with R eye closed                Education:  Pt was educated through out treatment regarding proper technique & safety with bed mobility, functional transfers & mobility along with ADL compensatory strategies to ease tasks, improve safety & prevent falls to return home safely. Comments: Upon arrival pt was in bed & agreeable for therapy. At end of session pt was seated in chair, TSM present, all lines and tubes intact, call light within reach. Pt has made Good progress towards set goals. Continue with current plan of care    Treatment Time In:  9:33          Treatment Time Out:  9:45          Treatment Charges: Mins Units   Ther Ex  16779     Manual Therapy 05490     Thera Activities 72016     ADL/Home Mgt 19788 12 1   Neuro Re-ed 96061     Group Therapy      Orthotic manage/training  72782     Non-Billable Time     Total Timed Treatment 12 1       Sherry YANEZ  [address] [place] [address], [address] [place] [address]

## 2021-08-04 LAB
ANION GAP SERPL CALCULATED.3IONS-SCNC: 13 MMOL/L (ref 7–16)
BUN BLDV-MCNC: 27 MG/DL (ref 6–23)
CALCIUM SERPL-MCNC: 9.6 MG/DL (ref 8.6–10.2)
CHLORIDE BLD-SCNC: 102 MMOL/L (ref 98–107)
CO2: 24 MMOL/L (ref 22–29)
CREAT SERPL-MCNC: 1.2 MG/DL (ref 0.5–1)
GFR AFRICAN AMERICAN: 55
GFR NON-AFRICAN AMERICAN: 45 ML/MIN/1.73
GLUCOSE BLD-MCNC: 140 MG/DL (ref 74–99)
HCT VFR BLD CALC: 43.1 % (ref 34–48)
HEMOGLOBIN: 14.3 G/DL (ref 11.5–15.5)
LV EF: 58 %
LVEF MODALITY: NORMAL
MCH RBC QN AUTO: 29.8 PG (ref 26–35)
MCHC RBC AUTO-ENTMCNC: 33.2 % (ref 32–34.5)
MCV RBC AUTO: 89.8 FL (ref 80–99.9)
METER GLUCOSE: 127 MG/DL (ref 74–99)
PDW BLD-RTO: 13.6 FL (ref 11.5–15)
PLATELET # BLD: 343 E9/L (ref 130–450)
PMV BLD AUTO: 9.9 FL (ref 7–12)
POTASSIUM SERPL-SCNC: 4.1 MMOL/L (ref 3.5–5)
RBC # BLD: 4.8 E12/L (ref 3.5–5.5)
SODIUM BLD-SCNC: 139 MMOL/L (ref 132–146)
WBC # BLD: 10.5 E9/L (ref 4.5–11.5)

## 2021-08-04 PROCEDURE — 97530 THERAPEUTIC ACTIVITIES: CPT

## 2021-08-04 PROCEDURE — 97129 THER IVNTJ 1ST 15 MIN: CPT

## 2021-08-04 PROCEDURE — 93306 TTE W/DOPPLER COMPLETE: CPT

## 2021-08-04 PROCEDURE — 6370000000 HC RX 637 (ALT 250 FOR IP): Performed by: INTERNAL MEDICINE

## 2021-08-04 PROCEDURE — 2060000000 HC ICU INTERMEDIATE R&B

## 2021-08-04 PROCEDURE — 82962 GLUCOSE BLOOD TEST: CPT

## 2021-08-04 PROCEDURE — 99253 IP/OBS CNSLTJ NEW/EST LOW 45: CPT | Performed by: PHYSICAL MEDICINE & REHABILITATION

## 2021-08-04 PROCEDURE — 97535 SELF CARE MNGMENT TRAINING: CPT

## 2021-08-04 PROCEDURE — 80048 BASIC METABOLIC PNL TOTAL CA: CPT

## 2021-08-04 PROCEDURE — 97110 THERAPEUTIC EXERCISES: CPT

## 2021-08-04 PROCEDURE — 85027 COMPLETE CBC AUTOMATED: CPT

## 2021-08-04 PROCEDURE — 99233 SBSQ HOSP IP/OBS HIGH 50: CPT | Performed by: NURSE PRACTITIONER

## 2021-08-04 PROCEDURE — 6370000000 HC RX 637 (ALT 250 FOR IP): Performed by: PSYCHIATRY & NEUROLOGY

## 2021-08-04 PROCEDURE — 36415 COLL VENOUS BLD VENIPUNCTURE: CPT

## 2021-08-04 RX ADMIN — GLIPIZIDE 5 MG: 5 TABLET ORAL at 17:00

## 2021-08-04 RX ADMIN — CLOPIDOGREL BISULFATE 75 MG: 75 TABLET ORAL at 09:20

## 2021-08-04 RX ADMIN — ASPIRIN 81 MG: 81 TABLET, COATED ORAL at 09:20

## 2021-08-04 RX ADMIN — ATORVASTATIN CALCIUM 40 MG: 40 TABLET, FILM COATED ORAL at 22:09

## 2021-08-04 RX ADMIN — GLIPIZIDE 5 MG: 5 TABLET ORAL at 05:44

## 2021-08-04 ASSESSMENT — PAIN SCALES - GENERAL
PAINLEVEL_OUTOF10: 0

## 2021-08-04 NOTE — PROGRESS NOTES
Acute Rehab Pre-Admission Screen      Referral date: 8/3/21  Onset/Hospital Admit Date: 8/1/2021 12:00 AM    Current Location: [facility]    Name: Caren Dial: 1960  Age: 57 y.o. Admitting Diagnosis:  CVA   Address: [address] [place] [address] Kenny Theodore   Home Phone: [phone] (home)  [address] #: [id number]    Sex: female  Race:   Marital Status:    Ethnic/Cultural/Jain Considerations: none charted    Advanced Directives: [x] Full Code  [] [facility] [] Medications only       [] Living Will  [] DPOA      []Organ donor      [] No mechanical breathing or ventilation     [] no tube feeding, nutrition or hydration      [] Patient does not have advanced directives or living will     Copies in Chart: no    COVERAGE INFORMATION   Primary Insurer: [facility]  Payor Contact:   Phone:   FAX:  Authorization #:   Secondary Insurer:   Medicare #: ***  Medicaid #: ***  Verified coverage: [] Patient  [] Family/caregiver    [x] financial department [] insurance carrier    COVID SCREEN DATE:  Result: (negative, positive)      MEDICAL UPDATE:  History of present admission: a 57 y.o. female who presents to the ED on 8/1/21 for altered mental status and garbled speech that began around 0630 this morning. Per family, patient also has left sided facial droop. 8/1/21-CT head:chronic appearing stroke in [facility]. Severe M1 stenosis . Plan for antiplatelet agents, neurology consult and possible EEG  8/2/21 OT and PT evals completed. EEG completed which showed nonspecific cerebral dysfunction of the right front temporal region and mild nonspecific cerebral dysfuncti  on of the left front temporal region. MRI   Acute infarcts involving the paramedian thalami bilaterally, right larger   than left.  No evidence of mass effect or midline shift. 2. Otherwise, no acute intracranial abnormality. 3. Chronic infarct involving the posterior right MCA territory.    4. Small chronic infarcts within the cerebellar hemispheres bilaterally. 5. Mild global parenchymal volume loss with chronic microvascular ischemic   changes.                         CT head: EEG showed no seizure activity. No a-fib  8/3 Echo complete MRI of the brain showed acute bilateral thalamic infarctions--right greater than left. She is very drowsy, with diffuse weakness worse on the left, but is able to follow commands and is oriented. EEG showed no seizures. No A. fib on telemetry. 8/4. She was sitting in chair in room with increased alertness. Still has diffuse weakness left greater than right. PHYSICIAN / REFERRAL INFORMATION  Referring Physician: Gisela Sterling DO  Attending Physician: Gisela Sterling DO  Primary Care Physician: Tabatha Josue DO  Consultants/Opinions (see full consult notes on chart): Neuroology    SOCIAL INFORMATION  Primary Contact Juliet Dutta  Relationship: spouse   Primary Phone: [phone]  Secondary Phone:  Secondary  Contact:   Relationship:   Primary Phone:   Secondary Phone:     Previous Community Services: ***  Caregiver available: [x] Yes [] No Hours per day available: ***  Patient previously employed:  [x] Yes [] Part Time [] Full Time [] No [] Retired  Occupation/Profession: unknown  Prior living arrangements: [x] Home  [] Assisted living  [] SNF [] Other  Lived with:  [] Alone  [x] Spouse  [] Family  [] Other  Lived with: Nel phone: [phone]   Home:  1 [place] home  3 entry steps  Rails: 2  Steps:  0 to 2nd floor  Rails:  0   Bedroom: [x] 1st floor  [] 2nd floor    Bathroom:  [x] 1st floor  [] 2nd floor    Prior Functional Level: Independent for: independent with ADL's, Assist from wife with  IAD:'s as needed. Ambulated using SPC as needed.   Assistance for: ***  Dependent for: ***  Dominant hand: [x] Right  [] Left    Previous Home Equipment:  [x] Cane [] Grab bars [] Orthotic / prosthetic   [x] Shower chair [] Tub bench  [] 3-in-1 Commode [] Long handle sponge   [] Oxygen [] Sock aide  [] Wheelchair  [] motorized wc/scooter  [] Wheelchair cushion   [] Crutches [] Long handle shoehorn  [] Reachers [] Toilet seat elevator [] Rollator  [] Walker(wheeled)   [] Walker(standard) [] Mechanical lift    [] None of the above     Has patient had 2 or more falls in the past year or any fall with injury in the past year? [] yes   [] no   []unknown    Has patient had major surgery during past 100 days prior to admission? [] yes   [] no Type/ Date: ***    Surgical History:  Past Surgical History:   Procedure Laterality Date    TOTAL NEPHRECTOMY Left 09/27/2016       Past Medical:  Past Medical History:   Diagnosis Date    Bone marrow edema 03/25/2021    Abnl. C-spine MRI wo/mikel.  Patient refused MR C-spien with contrast, c/o out-of-pocket cost of other MRI's    DDD (degenerative disc disease), cervical 03/25/2021    Diabetes mellitus ([facility]     Hyperlipidemia     Lumbar degenerative disc disease 03/15/2021    Multiple lacunar infarcts ([facility] [address] 03/25/2021    Numbness and tingling 03/15/2021    Postural dizziness 03/15/2021    UTI (urinary tract infection)        Current Co-morbidities:  [] Alzheimer's   [] Dysphasia    [] Parkinsonism  [] Amputation   [] GERD   [] Peripheral artery disease   [] Anemia      [] Encephalopathy  [] Peripheral vascular disease  [] Anxiety   [] Gangrene   [] Pneumonia  [] Aphasia   [] Gout   [] Polyneuropathy  [] Asthma   [] Heart Failure (diastolic) [] Post-polio syndrome  [] Atrial fibrillation  [] Heart Failure (left-sided) [] Pseudomonas enteritis   [] Blind   [] Heart Failure (right-sided) [] Pulmonary embolism  [] Cellulitis     [] Heart Failure (systolic) [] Renal dialysis  [] Clostridium difficile  [] Hemiparesis  [] Renal failure  [] Congestive heart failure [] Hypertension  [] Rheumatoid arthritis  [] COPD   [] Hypotension  [] Seizure disorder   [] Coronary Artery Disease [] Hypothyroidism  [] Septicemia   [] Deaf   [x] Hyperlipidemia  [] Sleep apnea  [] Depression   [] Morbid obesity  [] Spinal cord injury  [x] Diabetes   [] MRSA   [] Stroke  [] Diabetic nephropathy [] Myocardial infarction [] Tracheostomy  [] Diabetic neuropathy [] Osteoarthritis  [] Traumatic brain injury   [] Diabetic retinopathy [] Osteoporosis  [] Urinary tract infection  [] DVT   [] Pancytopenia  [] Vocal cord paralysis  []  [] *** kidney disease  [] VRE  [] Post op ***   [] ***    [] ***       Medical/Functional Conditions requiring inpatient rehabilitation: Patient has functional deficits in ADLS, mobility, speech, swallow and cognition, impaired balance, and needs ongoing medical management   Requires multidisciplinary treatment including PM&R physician daily care, 24 hour rehabilitation nursing, physical therapy, occupational therapy, rehabilitation psychology, recreation therapy and rehabilitation social work, nutrition services due to new deficits     Risk for Medical/Clinical Complications: Falls, injury, pain, skin breakdown, abnormal vitals, abnormal labs, DVT, PE, pneumonia, decreased mobility, neuro changes    CLINICAL DATA:     Height : 5' 5\"     Weight:  199lbs   BMI: 33.22       Date: 8/4/21 Date: 8/5/21 Date: ***   temperature 97.5 97.2    pulse 71 77    respirations  16    Blood pressure 129/64 126/76    Pulse oximeter 95% room aire 97% room aire       ALLERGIES: Codeine and Pyridium [phenazopyridine hcl]    DIET : ADULT DIET; Regular; 4 carb choices (60 gm/meal);  Low Sodium (2 gm)    Current Lab and Diagnostic Tests:   Recent Results (from the past 24 hour(s))   POCT Glucose    Collection Time: 08/04/21  5:43 AM   Result Value Ref Range    Meter Glucose 127 (H) 74 - 99 mg/dL   CBC    Collection Time: 08/04/21  7:38 AM   Result Value Ref Range    WBC 10.5 4.5 - 11.5 E9/L    RBC 4.80 3.50 - 5.50 E12/L    Hemoglobin 14.3 11.5 - 15.5 g/dL    Hematocrit 43.1 34.0 - 48.0 %    MCV 89.8 80.0 - 99.9 fL    MCH 29.8 26.0 - 35.0 pg    MCHC 33.2 32.0 - 34.5 %    RDW 13.6 11.5 - 15.0 fL    Platelets 960 245 - 326 E9/L    MPV 9.9 7.0 - 12.0 fL   Basic Metabolic Panel    Collection Time: 08/04/21  7:38 AM   Result Value Ref Range    Sodium 139 132 - 146 mmol/L    Potassium 4.1 3.5 - 5.0 mmol/L    Chloride 102 98 - 107 mmol/L    CO2 24 22 - 29 mmol/L    Anion Gap 13 7 - 16 mmol/L    Glucose 140 (H) 74 - 99 mg/dL    BUN 27 (H) 6 - 23 mg/dL    CREATININE 1.2 (H) 0.5 - 1.0 mg/dL    GFR Non-African American 45 >=60 mL/min/1.73    GFR African American 55     Calcium 9.6 8.6 - 10.2 mg/dL     MRI BRAIN WO CONTRAST    Result Date: 8/3/2021  EXAMINATION: MRI OF THE BRAIN WITHOUT CONTRAST  8/2/2021 8:23 pm TECHNIQUE: Multiplanar multisequence MRI of the brain was performed without the administration of intravenous contrast. COMPARISON: 03/23/2021. HISTORY: ORDERING SYSTEM PROVIDED HISTORY: stroke TECHNOLOGIST PROVIDED HISTORY: Reason for exam:->stroke What reading provider will be dictating this exam?->RP FINDINGS: INTRACRANIAL STRUCTURES/VENTRICLES: Acute infarcts are seen involving is the paramedian thalami bilaterally, right larger than left. No mass effect or midline shift. A chronic infarct is seen involving the right temporal/parietal lobe, which is unchanged. No evidence of an acute intracranial hemorrhage. Areas of T2 FLAIR hyperintensity are seen in the periventricular and subcortical white matter, which are nonspecific, but may represent chronic microvascular ischemic change. There is prominence of the ventricles and sulci due to global parenchymal volume loss. Small chronic infarcts involving the cerebellar hemispheres bilaterally. The sellar/suprasellar regions appear unremarkable. The normal signal voids within the major intracranial vessels appear maintained. ORBITS: The visualized portion of the orbits demonstrate no acute abnormality. SINUSES: The visualized paranasal sinuses and mastoid air cells are well aerated.  BONES/SOFT TISSUES: The bone marrow signal intensity appears normal. The soft tissues demonstrate no acute abnormality. 1. Acute infarcts involving the paramedian thalami bilaterally, right larger than left. No evidence of mass effect or midline shift. 2. Otherwise, no acute intracranial abnormality. 3. Chronic infarct involving the posterior right MCA territory. 4. Small chronic infarcts within the cerebellar hemispheres bilaterally. 5. Mild global parenchymal volume loss with chronic microvascular ischemic changes. These results were sent to the [employer] [address] ([address] [place] [address]) on 8/3/2021 at 7:06 am to be communicated to the referring/covering health care provider/office.        Additional labs or diagnostic studies needed before admission to rehabilitation unit:  ***    Medications:   vitamin D  50,000 Units Oral Weekly    glipiZIDE  5 mg Oral BID AC    aspirin  81 mg Oral Daily    atorvastatin  40 mg Oral Nightly    clopidogrel  75 mg Oral Daily      dextrose       acetaminophen, glucose, dextrose, glucagon (rDNA), dextrose    SPECIAL PRECAUTIONS: [] No current precautions  [] Cardiac  [] Renal [] Sternal [] Respiratory      [x] Neurological           [] Hip  [] Spinal [] Seizure  [] Aspiration  [] Isolation precautions:    [] Contact   [] Respiratory   [] Protective     [] Droplet    [] Weight Bearing precautions:         [] Non Weight Bearing : ***        [] Toe Touch Weight Bearing : ***       [] Partial Weight Bearing : ***        [] Weight Bearing as Tolerated : ***        [] Fall Risk:   [] Recent history of falls [x] Falls risk level (Sotomayor Scale): high      [] Bed Alarm    [] Do not leave alone in the bathroom    [] Chair Alarm    [x] Cognitive impairment      [] One to One supervision  [] Sitter / Rolanddall Pelt sitter   [] Safety enclosure bed  [] Decreased balance     SPECIAL REHABILITATION NEEDS:   [x] IV Therapy: [] PRN Adapter  [] Midline  [] PICC      [] Central Line    [] TPN       [] Oxygen: [] Trach [] Bi-PAP [] CPAP  [] Nasal per day split into four 45 minute sessions. [x] Able to participate a minimum of 3 hours per day of therapy intervention    Required treatments/services: [x] Rehabilitation nursing [] Dietitian / nurtition                 [x] Case management  [] Respiratory Therapy      [x] Social work   [] Other ***    Required Therapy:  Therapy Hours per Day Days per Week Therapeutic Interventions Required   [x] Physical Therapy 1 5-7 Gait, transfers, Safety, strength, education, endurance   [x] Occupational Therapy 1 5-7 ADLs, IADLs, Safety, strength, education, endurance   [x] Speech Pathology 1 5-7 Cognition.  education   [] Prosthetics / Orthotics       []         Anticipated Discharge Plan:   Anticipated DME Needs:  [x] Home     [] Commode   [] Alone    [] Wheelchair   [] Supervised    [] Walker   [] Assist    [] Oxygen        [] Hospital Bed  [] Assisted Living    [] Ramp        [] To Be Determined    Anticipated Home Health Services:  Anticipated Outpatient Services:  [] PT       [] PT  [] OT      [] OT  [] Speech     [] Speech  [] Nursing     [] Dialysis  [] Aide      [x] To Be Determined  [x] To Be Determined    Anticipated support group:  [] Amputation  [] Multiple Sclerosis  [x] Stroke  [] Brain Injury  [] Spinal cord injury  [] Other ***    Barriers to discharge: ***    Discharge Support: [] Patient lives alone and does not have a caregiver available     [x] Patient has a caregiver available     [] Discharge plan has been verified with patient's caregiver      [] Caregiver is in agreement with the discharge plan     Expected functional status for safe discharge: ***    Patient/support person goals: ***    Expected length of stay: ***    Discussed expected length of stay and agreeable to IRF plan: [] Yes   [] No    Impairment Group Category: ***    Etiological Diagnosis: ***    Primary Rehabilitation Diagnosis: ***    Electronically signed by Aurelio Pineda RN on 8/4/2021 at 12:05 PM    Prescreen completed __________________________________ (signature of prescreener)    Date:   *** Time:  ***    JUSTIFICATION FOR ADMISSION TO ACUTE REHABILITATION:  Impaired mobility, impaired self- care, cognition        RECOMMEND LEVEL OF CARE  Recommend inpatient rehabilitation: [x] Yes   [] No  If no indicate reason:  [] Functional level too high  [] Unmotivated  [] No insurance carrier approval [] Unlikely to return to community  [] No medical necessity  [] Patient or family chose other facility  [] Too medically complex  [] Inadequate discharge plan  [] Rehabilitation bed unavailable [] Functional level too low  [] patient or family refused ARU    If patient not accepted for IRF admission, recommended level of care:  [] [facility]  [] [facility]  [] [place]   [] Home Care  [] Other      [] LTAC       Physician Assigned:  [x] Dr. Mindy De Los Santos         [] Dr. Sun Naylor              [] Dr. Meir Moore [] Dr. Dona Quinones  [] Dr. Violetta Ingram:    ____________________________________________________________________  ____________________________________________________________________  ____________________________________________________________________  ____________________________________________________________________  ____________________________________________________________________      Physician Signature:_____________________________________    Print Signature:_________________________________________    Date:   *** Time:    ***    PRE-SCREEN ASSESSMENT UPDATE (if not admitted within 48 hours of initial pre-screen)    Medical Update/Changes: Prescreen updated to reflect current data since initiated. Functional Update/Changes: Therapy notes updated on prescreen graph to reflect current status.     Reviewer Signature:_____________________________________    Date:  *** Time: ***    PHYSICIAN ADMISSION DETERMINATION AND REVIEW UPDATE: ____________________________________________________________________  ____________________________________________________________________  ____________________________________________________________________  ____________________________________________________________________  ____________________________________________________________________    Physician Signature:_____________________________________    Print Signature:_________________________________________    Date: ***    Time:  ***      *** Be sure all blanks are filled in and then delete this phrase

## 2021-08-04 NOTE — CONSULTS
PM&R Consult Note  Patient: Caprice Funes  Age/sex: 57 y.o. female  Medical Record #: [id number]      Referring physician: Adrian Silver DO  Consulting physician: Dr. Donnie Worthington MD  Primary care provider: Earl Hendrickson DO        Chief complaint:   Impairments and acitivities limitations in ADLs and mobility, secondary to acute CVA    HPI:   Caprice Funes is a 57 y.o. female who presented to [facility] on 8/1/2021 due to altered mental status, garbled speech, left facial droop, left-sided weakness. Head CT showed no acute abnormality. Patient was evaluated by Neurology and brain MRI revealed acute bilateral thalamic infarcts as well as an old appearing infarct in the right MCA territory. EEG was completed and showed no seizure activity. She was started on DAPT and high intensity statin. She has participated in therapy evaluations. Past Medical History:   Diagnosis Date    Bone marrow edema 03/25/2021    Abnl. C-spine MRI wo/mikel. Patient refused MR C-spien with contrast, c/o out-of-pocket cost of other MRI's    DDD (degenerative disc disease), cervical 03/25/2021    Diabetes mellitus ([facility] [address]     Hyperlipidemia     Lumbar degenerative disc disease 03/15/2021    Multiple lacunar infarcts (HCC) 03/25/2021    Numbness and tingling 03/15/2021    Postural dizziness 03/15/2021    UTI (urinary tract infection)      Past Surgical History:   Procedure Laterality Date    TOTAL NEPHRECTOMY Left 09/27/2016       History reviewed. No pertinent family history.     Allergies   Allergen Reactions    Codeine     Pyridium [Phenazopyridine Hcl]        Scheduled Meds:  vitamin D, 50,000 Units, Weekly  glipiZIDE, 5 mg, BID AC  aspirin, 81 mg, Daily  atorvastatin, 40 mg, Nightly  clopidogrel, 75 mg, Daily        PRN Meds  acetaminophen, 650 mg, Q6H PRN  glucose, 15 g, PRN  dextrose, 12.5 g, PRN  glucagon (rDNA), 1 mg, PRN  dextrose, 100 mL/hr, PRN        Social History:  Social History     Socioeconomic History    Marital status:      Spouse name: Not on file    Number of children: Not on file    Years of education: Not on file    Highest education level: Not on file   Occupational History    Not on file   Tobacco Use    Smoking status: Current Every Day Smoker     Packs/day: 1.00     Years: 30.00     Pack years: 30.00     Types: Cigarettes    Smokeless tobacco: Never Used   Substance and Sexual Activity    Alcohol use: No    Drug use: No    Sexual activity: Yes     Partners: Female   Other Topics Concern    Not on file   Social History Narrative        DIABETES--DX 1-09    SMOKER    WEIGHT    OA    CHRONIC LOW BACK PAIN    BULGE LUMBAR DISC----WC WITH DR GARY    ELEV TSH IN PAST    LIPID    AFTERNOON SHIFT [employer] MOLDED PRODUCTS    SEVERE NON COMPLIANCE    OBESITY    BRO  2013 AGE 47 MI--SMOKER---OBESE    ELEV WBC 12-14    ELEV CREA 12-14    PROTEINURIA    UTI -16    [employer] MOLDED CO    ER WITH URINE RETENTION -----FLEY--UROL    TOTAL L;EFT NEPHRECTOMY ---RENAL CELL CARCINOMA-----  DR OLIVA----TWO    MASS LEFT KIDNEY----CYST ON RIGHT KIDNEY    Boss  Jonas Douglas    Unsteady gait, multifocused CVA, seeing Dr. Jacob Estimable. Surgical consult     Mri    brain  3-21 iwht  radha of old  cva----referred dr Henry Lau   with dr schreiber with  two diff neuroapthy---     Social Determinants of Health     Financial Resource Strain:     Difficulty of Paying Living Expenses:    Food Insecurity:     Worried About Running Out of Food in the Last Year:     [address] in the Last Year:    Transportation Needs:     Lack of Transportation (Medical):      Lack of Transportation (Non-Medical):    Physical Activity:     Days of Exercise per Week:     Minutes of Exercise per Session:    Stress:     Feeling of Stress :    Social Connections:     Frequency of Communication with Friends and Family:     Frequency of Social Gatherings with Friends and Family:     Attends Hindu Services:     Active Member of Clubs or Organizations:     Attends Club or Organization Meetings:     Marital Status:    Intimate Partner Violence:     Fear of Current or Ex-Partner:     Emotionally Abused:     Physically Abused:     Sexually Abused:        Home situation: Lives with spouse in 1 level home with 3 steps to enter and 2 rails. Functional History:  Premorbid ADL's: independent for ADLs, assist for IADLs as needed  Premorbid Mobility: Ambulated with SPC as needed  Present: decrease in mobility and function     Physical Therapy:  Bed mobility: SBA  Transfers: SBA  Ambulation: 75 feet SPC SBA    Occupational Therapy:  Feeding: Mod.  I  Grooming: SBA  UB dressing: Min. A  LB dressing: Min. A      Review Of Systems:   Contsitutional: No Fever, chills  HEENT: No HA, blurry vision  Respiratory: No Cough, SOB  Cardiovascular: No CP  Gastrointestinal: No nausea, vomiting, diarrhea, abdominal discomfort; + continent   Genitourinary: No Dysuria  Musculoskeletal: per HPI  Neurologic: Per HPI  Psychiatric: No Depression, No anxiety      Physical Examination:  Vitals:   Vitals:    08/03/21 1501 08/03/21 2308 08/04/21 0845 08/04/21 1606   BP: 133/75 (!) 149/72 129/64 135/77   Pulse: 75 77 71 73   Resp: 14 16 16 14   Temp: 96.3 °F (35.7 °C) 96.4 °F (35.8 °C) 97.5 °F (36.4 °C) 96.8 °F (36 °C)   TempSrc: Temporal Temporal Temporal Temporal   SpO2: 93% 96% 95% 94%   Weight:       Height:           GEN: NAD, resting in bed, alert  HEENT: NC/AT, PERRL  RESP: CTAB, no R/R/W  CV: +S1 S2, RRR  ABD: soft, NT, ND, normal BS   EXT: No erythema/clubbing/cyanosis  Skin: No rash    Neuro Exam:  AAO x4  Speech is dysarthric. No aphasia. Responses are mildly delayed.   Follow simple commands, mildly delayed processing    Cranial Nerves:  I: olfactory not tested  II  visual fields: Left visual field deficit  III, IV, VI: Difficulty with leftward gaze  Pupils (II, III): ERRL  V: Facial Sensation/Jaw clench: intact  VII: Facial Motor: Left facial droop  VIII. Hearing: intact  XI/X: Palate: intact  XI: Shoulder shrug/SCM:intact  XII: Tongue: intact    Coordination:  F - N: Reduced on the left relative to weakness, intact on right    Sensory:      LT and PP intact throughout    Motor:    LUE 4-/5 at deltoid, biceps, triceps; LUE 3/5 WE, FF, , intrinsics  Strength is otherwise 4/5 throughout the right upper and bilateral lower extremity      DTRs:  Reflex Right Left   Biceps 2+ 2+   Triceps 2+ 2+   Brachioradialis 2+ 2+   Patella 2+ 2+   Achilles  1+ 1+       Bilateral Babinski present    Gait deferred    Laboratory:    Lab Results   Component Value Date    WBC 10.5 08/04/2021    HGB 14.3 08/04/2021    HCT 43.1 08/04/2021    MCV 89.8 08/04/2021     08/04/2021     Lab Results   Component Value Date     08/04/2021    K 4.1 08/04/2021    K 4.3 07/31/2021     08/04/2021    CO2 24 08/04/2021    BUN 27 08/04/2021    CREATININE 1.2 08/04/2021    GLUCOSE 140 08/04/2021    CALCIUM 9.6 08/04/2021      Lab Results   Component Value Date    ALT 8 07/31/2021    AST 10 07/31/2021    ALKPHOS 133 (H) 07/31/2021    BILITOT 0.3 07/31/2021       Lab Results   Component Value Date    COLORU Yellow 02/24/2021    NITRU Negative 02/24/2021    GLUCOSEU Negative 02/24/2021    KETUA Negative 02/24/2021    UROBILINOGEN 0.2 02/24/2021    BILIRUBINUR Negative 02/24/2021     Lab Results   Component Value Date    LABA1C 6.6 (H) 07/26/2021         IMPRESSION:  Bert Moody is a 57 y.o. female with impairments and acitivities limitations in ADLs and mobility, secondary to acute CVA      Recommendations:   Patient is appropriate for Acute Rehabilitation when medically cleared and pending insurance pre-CERT        Thank you for the consult.     Nola Ramirez MD  Physical Medicine and Rehabilitation

## 2021-08-04 NOTE — PROGRESS NOTES
Hospital Medicine    Subjective:  Pt alert conversive      Current Facility-Administered Medications:     vitamin D (ERGOCALCIFEROL) capsule 50,000 Units, 50,000 Units, Oral, Weekly, Daniel Sanchez MD, 50,000 Units at 08/02/21 0848    glipiZIDE (GLUCOTROL) tablet 5 mg, 5 mg, Oral, BID AC, Daniel Sanchez MD, 5 mg at 08/04/21 0544    aspirin EC tablet 81 mg, 81 mg, Oral, Daily, Daniel Sanchez MD, 81 mg at 08/03/21 0816    acetaminophen (TYLENOL) tablet 650 mg, 650 mg, Oral, Q6H PRN, Daniel Sanchez MD    atorvastatin (LIPITOR) tablet 40 mg, 40 mg, Oral, Nightly, Daniel Sanchez MD, 40 mg at 08/03/21 2050    glucose (GLUTOSE) 40 % oral gel 15 g, 15 g, Oral, PRN, Daniel Sanchez MD    dextrose 50 % IV solution, 12.5 g, Intravenous, PRN, Daniel Sanchez MD    glucagon (rDNA) injection 1 mg, 1 mg, Intramuscular, PRN, Daniel Sanchez MD    dextrose 5 % solution, 100 mL/hr, Intravenous, PRN, Daniel Sanchez MD    clopidogrel (PLAVIX) tablet 75 mg, 75 mg, Oral, Daily, Chichi Montero MD, 75 mg at 08/03/21 0816    Objective:    BP (!) 149/72   Pulse 77   Temp 96.4 °F (35.8 °C) (Temporal)   Resp 16   Ht 5' 5\" (1.651 m)   Wt 199 lb 9.6 oz (90.5 kg)   SpO2 96%   BMI 33.22 kg/m²     Heart:  reg  Lungs:  ctab  Abd: + bs soft nontender  Extrem:  W/o edema    CBC with Differential:    Lab Results   Component Value Date    WBC 11.5 07/31/2021    RBC 4.48 07/31/2021    HGB 13.7 07/31/2021    HCT 41.0 07/31/2021     07/31/2021    MCV 91.5 07/31/2021    MCH 30.6 07/31/2021    MCHC 33.4 07/31/2021    RDW 14.0 07/31/2021    LYMPHOPCT 18.9 07/31/2021    MONOPCT 5.5 07/31/2021    BASOPCT 0.3 07/31/2021    MONOSABS 0.63 07/31/2021    LYMPHSABS 2.17 07/31/2021    EOSABS 0.18 07/31/2021    BASOSABS 0.04 07/31/2021     CMP:    Lab Results   Component Value Date     07/31/2021    K 4.3 07/31/2021     07/31/2021    CO2 25 07/31/2021    BUN 18 07/31/2021    CREATININE 1.0 07/31/2021    GFRAA >60 07/31/2021    LABGLOM 56 07/31/2021    GLUCOSE 142 07/31/2021    PROT 7.5 07/31/2021    LABALBU 4.0 07/31/2021    CALCIUM 9.4 07/31/2021    BILITOT 0.3 07/31/2021    ALKPHOS 133 07/31/2021    AST 10 07/31/2021    ALT 8 07/31/2021     Warfarin PT/INR:    Lab Results   Component Value Date    INR 1.0 07/31/2021    INR 1.0 10/07/2020    PROTIME 11.3 07/31/2021    PROTIME 11.5 10/07/2020       Assessment:    Principal Problem:    Acute CVA (cerebrovascular accident) [facility])  Active Problems:    Type 2 diabetes mellitus without complication, without long-term current use of insulin ([address]  Resolved Problems:    * No resolved hospital problems.  *      Plan:  Nino planning increase activity await rasta Jason DO  7:24 AM  8/4/2021

## 2021-08-04 NOTE — PROGRESS NOTES
Shaq Moreira is a 57 y.o. right handed female     Neuro is following for stroke    PMH: DDD, diabetes, previous lacunar strokes, HTN, HLD left nephrectomy, smoking    Synopsis:  She presented with garbled speech and left-sided weakness. Imaging showed chronic right MCA territory stroke and severe stenosis in her right M1perfusion imaging was nondiagnostic. Her altered mental status during admission concerning for postictal state  MRI of the brain showed acute bilateral thalamic infarctions--right greater than left. She is sitting up in the chair in the room, more alert today. Still has diffuse weakness left greater than right. Wants to go home and has no other complaints. Echo is pending. No A. fib on telemetry.     There is no family present and she is otherwise medically stable--wife was updated via phone yesterday    Current Facility-Administered Medications   Medication Dose Route Frequency Provider Last Rate Last Admin    vitamin D (ERGOCALCIFEROL) capsule 50,000 Units  50,000 Units Oral Weekly Yoel Mead MD   50,000 Units at 08/02/21 0848    glipiZIDE (GLUCOTROL) tablet 5 mg  5 mg Oral BID AC Yoel Mead MD   5 mg at 08/04/21 0544    aspirin EC tablet 81 mg  81 mg Oral Daily Yoel Mead MD   81 mg at 08/04/21 0920    acetaminophen (TYLENOL) tablet 650 mg  650 mg Oral Q6H PRN Yoel Mead MD        atorvastatin (LIPITOR) tablet 40 mg  40 mg Oral Nightly Yoel Mead MD   40 mg at 08/03/21 2050    glucose (GLUTOSE) 40 % oral gel 15 g  15 g Oral PRN Yoel Mead MD        dextrose 50 % IV solution  12.5 g Intravenous PRN Yoel Mead MD        glucagon (rDNA) injection 1 mg  1 mg Intramuscular PRN Yoel Mead MD        dextrose 5 % solution  100 mL/hr Intravenous PRN Yoel Mead MD        clopidogrel (PLAVIX) tablet 75 mg  75 mg Oral Daily Josephine Melchor MD   75 mg at 08/04/21 0920     Objective: /64   Pulse 71   Temp 97.5 °F (36.4 °C) (Temporal)   Resp 16   Ht 5' 5\" (1.651 m)   Wt 199 lb 9.6 oz (90.5 kg)   SpO2 95%   BMI 33.22 kg/m²     General appearance: alert, appears stated age, in no acute distress sitting up in the chair  Head: normocephalic, without obvious abnormality, atraumatic  Eyes: conjunctivae/corneas clear.  .  Neck: No carotid bruit, full range of motion  Lungs: clear to auscultation bilaterally  Heart: regular rate and rhythm--NSR  Extremities: normal, atraumatic, no cyanosis or edema  Pulses: 2+ and symmetric  Skin: color, texture, turgor normal---no rashes or lesions      Mental Status: alert, oriented x4--flat affect    Follows all simple commands well with some cueing--slightly delayed    Speech: Dysarthric  Language: Laconic; no aphasias    Cranial Nerves:  I: smell NA   II: visual acuity  NA   II: visual fields Full to confrontation   II: pupils EASTON   III,VII: ptosis B/l R>L   III,IV,VI: extraocular muscles   difficulty with vertical up and downward gaze--difficulty maintaining L gaze   V: mastication Normal   V: facial light touch sensation  Normal   V,VII: corneal reflex     VII: facial muscle function - upper  Normal   VII: facial muscle function - lower  left seventh upper motor neuron paresis   VIII: hearing Normal   IX: soft palate elevation  Normal   IX,X: gag reflex    XI: trapezius strength  5/5   XI: sternocleidomastoid strength 5/5   XI: neck extension strength  5/5   XII: tongue strength  Normal     Motor:   Diffuse weakness; worse on the left--4/5 L arm with +drift;   BLE weakness L>R  Normal bulk and tone  No abnormal movements    Sensory:  LT and PP normal in all limbs    Coordination:   FFN and FN slower on L relative to weakness    DTR:   No Cardenas's    No pathological reflexes    Laboratory/Radiology:     CBC with Differential:    Lab Results   Component Value Date    WBC 10.5 08/04/2021    RBC 4.80 08/04/2021    HGB 14.3 08/04/2021    HCT 43.1 08/04/2021     08/04/2021    MCV 89.8 08/04/2021    MCH 29.8 08/04/2021    MCHC 33.2 08/04/2021    RDW 13.6 08/04/2021    LYMPHOPCT 18.9 07/31/2021    MONOPCT 5.5 07/31/2021    BASOPCT 0.3 07/31/2021    MONOSABS 0.63 07/31/2021    LYMPHSABS 2.17 07/31/2021    EOSABS 0.18 07/31/2021    BASOSABS 0.04 07/31/2021     CMP:    Lab Results   Component Value Date     08/04/2021    K 4.1 08/04/2021    K 4.3 07/31/2021     08/04/2021    CO2 24 08/04/2021    BUN 27 08/04/2021    CREATININE 1.2 08/04/2021    GFRAA 55 08/04/2021    LABGLOM 45 08/04/2021    GLUCOSE 140 08/04/2021    PROT 7.5 07/31/2021    LABALBU 4.0 07/31/2021    CALCIUM 9.6 08/04/2021    BILITOT 0.3 07/31/2021    ALKPHOS 133 07/31/2021    AST 10 07/31/2021    ALT 8 07/31/2021     HgBA1c:    Lab Results   Component Value Date    LABA1C 6.6 07/26/2021     FLP:    Lab Results   Component Value Date    TRIG 176 07/26/2021    HDL 37 07/26/2021    LDLCALC 136 07/26/2021    LABVLDL 35 07/26/2021     CTA head/neck: No perfusion mismatch. 2. Short segment moderate to severe stenosis in the right middle cerebral artery M1 segment. Otherwise, no acute large vessel occlusion. 3. Unremarkable CTA of the neck     MRI brain: Acute infarcts involving the paramedian thalami bilaterally, right larger than left. No evidence of mass effect or midline shift. 2. Otherwise, no acute intracranial abnormality. 3. Chronic infarct involving the posterior right MCA territory. 4. Small chronic infarcts within the cerebellar hemispheres bilaterally. 5. Mild global parenchymal volume loss with chronic microvascular ischemic changes. EEG: Moderate nonspecific cerebral dysfunction of the right frontotemporal region  2. Mild nonspecific cerebral dysfunction of the left frontotemporal region   Structural abnormalities should be considered for the findings above and appropriate imaging obtained if clinically indicated.     No seizures or epileptiform discharges were noted during this study    Echo pending    All labs and images personally reviewed today    Assessment:     Acute bilateral thalamic strokes: suspect occlusion of the artery of [place] (though not seen on CTAs)--vs other embolic event to posterior circulation. Her lethargy and AMS have improved and cardiac workup is underway     Previous R MCA stroke: due to mod severe stenosis of short segment of her RM1--also concerning for embolic disease.      Plan:     F/U Echo w bubble--if neg, needs additional cardioembolic workup as OP    Continue DAPT for 21 days then reduce to Plavix monotherapy    Continue high intensity statin--goal LDL <70    Continue PT/OT/ST    Stroke clinic follow up    Will follow up echo results    CORINA Rodriguez - CNP  9:48 AM  8/4/2021

## 2021-08-04 NOTE — CARE COORDINATION
Echo ordered 8/3 for stroke work-up. Spoke with Daniel jorge, manager of the echo department re: possibly having test completed in the morning for ARU to initiate authorization if patient is accepted. She will add the patient to the list to be completed tomorrow. Will continue to follow.      Vandana Worthington RN.  [facility]  [phone]

## 2021-08-04 NOTE — PROGRESS NOTES
Occupational Therapy  OT BEDSIDE TREATMENT NOTE   [facility] [address]  [address] [place] [address]       Date:2021  Patient Name: Guerrero Key  MRN: [id number]  : 1960  Room: [address] [place] [address]     Per OT Eval:    Evaluating OT: Dina Steiner, [address] OTR/L; [id number]       Referring Provider: Nicolás Luke DO    Specific Provider Orders/Date: OT Eval and Treat 21       Diagnosis: Acute CVA    Surgery: none     Pertinent Medical History: cancer of kidney (), Vasovagal syncope (), DM2, Hyperlipidemia, Neuropathy, Lumbar degenerative disc disease, hx of postural dizziness and neurologic gait dysfunction (), multiple lacunar infarcts (3/2021), DDD, total nephrectomy ()  Recommended Adaptive Equipment: TBD       Precautions:  Fall Risk, cognition, L visual field deficits      Assessment of current deficits    [x] Functional mobility            [x]ADLs           [x] Strength                  [x]Cognition    [x] Functional transfers          [x] IADLs         [x] Safety Awareness   [x]Endurance    [x] Fine Coordination                         [x] Balance      [x] Vision/perception   [x]Sensation      []Gross Motor Coordination             [] ROM           [] Delirium                   [] Motor Control      OT PLAN OF CARE   OT POC based on physician orders, patient diagnosis and results of clinical assessment      Frequency/Duration: 3-5 days/wk for 2 weeks PRN   Specific OT Treatment Interventions to include:   * Instruction/training on adapted ADL techniques and AE recommendations to increase functional independence within precautions       * Training on energy conservation strategies, correct breathing pattern and techniques to improve independence/tolerance for self-care routine  * Functional transfer/mobility training/DME recommendations for increased independence, safety, and fall prevention  * Patient/Family education to increase follow through with safety techniques and functional independence  * Recommendation of environmental modifications for increased safety with functional transfers/mobility and ADLs  * Cognitive retraining/development of therapeutic activities to improve problem solving, judgement, memory, and attention for increased safety/participation in ADL/IADL tasks  * Therapeutic exercise to improve motor endurance, ROM, and functional strength for ADLs/functional transfers  * Therapeutic activities to facilitate/challenge dynamic balance, stand tolerance for increased safety and independence with ADLs     Modified [place] Scale (MRS)  Score     Description  0             No symptoms  1             No significant disability despite symptoms  2             Slight disability; able to look after own affairs  3             Moderate disability; able to ambulate without assist/ requires assist with ADLs  4             Moderate/Severe disability;requires assist to ambulate/assist with ADLs  5             Severe disability;bedridden/incontinent   6               Score: 4     Home Living: Pt lives with wife in [facility], [address] home, with 3 stairs and bilateral handrails to enter.    Bathroom setup: tub/shower unit with shower chair, safety frame over commode  Equipment owned: cane, shower chair, safety frame     Prior Level of Function: independent with ADLs , assist from wife with IADLs as needed; ambulated using SPC occasionally/as needed     Pain Level: Pt did not complain of pain this session  Cognition: A&O: 2/4 - oriented to self and location -reoriented to month/year (stated July and )   Follows 1-2 step directions, pleasant & cooperative               Memory: fair              Sequencing:  fair              Problem solving:  fair              Judgement/safety:  fair-                Functional Assessment:  AM-PAC Daily Activity Raw Score: 16    Initial Eval Status  Date: 21 Treatment Status  Date:  21 STGs = LTGs  Time frame: 10-14 days   Feeding Stand by Assist   Opening packages/tray set up  Mod Indep  Seated upright in chair IND   Grooming Minimal Assist   Hand hygiene standing sink side SBA  Pt washed face, applied deodorant, combed hair, cueing for follow through [facility]  Ranjit/doff hospital gown seated upright in chair Independent    LB Dressing Maximal Assist   Donning/doffing socks seated EOB  Posterior LOB during task   sequencing deficits SBA-socks  Pt donned/doffed hospital socks using cross over technique seated upright in chair    Kirstie-dressing  Ranjit brief per previous level, DNT this date Goal Update 8/4   IND   Bathing Moderate Assist  D/t sequencing deficit  And balance deficit Kirstie  Pt compelted sponge bathing task seated/standing, with pt able to wash of UB and LB using cross over technique, assistance to stand and wash of buttocks for safety Goal Update 8/4   IND   Toileting Moderate Assist  For safety d/t balance during pericare  Sequencing task DNT  Kirstie per previous level Stand by Assist    Bed Mobility  Log Roll: Min A  Supine to sit: Minimal Assist   Sit to supine: Minimal Assist  Kirstie  Supine<>EOB    HOB slightly elevated with use of bed rail Supine to sit: Independent   Sit to supine: Independent    Functional Transfers Sit to stand:Min A   Stand to sit:Min A  Stand pivot: Min A  Commode: Min A Kirstie  Sit to Stand  Stand to [employer] Transfer EOB<>Chair with w.w.     Cueing for hand placement   Sit to stand:IND   Stand to sit:IND  Stand pivot: IND  Commode: IND   Functional Mobility Min A with ww  To bathroom Kirstie  Pt took of short steps during SPT with w.w., cueing for safety and walker management Moderate [place] with ww/cane   Balance Sitting:     Static - SBA     Dynamic - Min A  Standing: Min A Sitting EOB:   SBA    Standing:   CGA/Kirstie     Sitting:  Static: IND  Dynamic: IND  Standing: IND   Activity Tolerance Fair  Fair Good   Visual/  Perceptual Glasses: yes - reading only     L side visual field deficits - difficulty managing room obstacles on left side during functional mobility from bathroom to bed    L inattention/neglect  Pt walked past her room which was on her left walking back towards room    Pt able read name tag & clock with R eye closed                Education:  Pt was educated through out treatment regarding proper technique & safety with bed mobility, functional transfers & mobility along with ADL compensatory strategies to ease tasks, improve safety & prevent falls to return home safely. Pt also educated on UB exercises, completing of multiple reps in all planes, focusing on increasing of ROM and prevention of contractures. Comments: Upon arrival pt supine in bed, agreeable to therapy, speaking with nursing okaying pt to be seen this session. Pt completed of bed mobility, functional mobility of short steps, transfers, ADL tasks and UB exercises throughout session. Pt being fatigued throughout session, with cueing for follow through, providing of rest breaks as needed. At end of session, pt seated upright in chair, all tubes and lines intact, call light within reach, nursing aware. Pt has made good progress towards set goals.    Continue with current plan of care focusing on increasing of independency with transfers and ADL tasks    Treatment Time In: 11:22am           Treatment Time Out: 11:46am           Treatment Charges: Mins Units   Ther Ex  [id number]     Manual Therapy [id number]     Thera Activities 27665 12 1   ADL/Home Mgt 31690 12 1   Neuro Re-ed 16081     Group Therapy      Orthotic manage/training  38551     Non-Billable Time     Total Timed Treatment 24 2       Hermelinda Velarde LESTER/L [id number]

## 2021-08-04 NOTE — PROGRESS NOTES
Speech Language Pathology      NAME:  Tiffanie Ravi  DATE: 2021  :  1960  ROOM:  [facility]    Pt was seen for therapy regarding dysarthria and cognitive deficits. Patient was not alert upon initiation of therapy. Patient required continuous verbal cues to maintain awake and become oriented to situation. Patient engaged in appropriate conversation with SLP intern about her spouse, favorite restaurants, favorite food, and other common topics. Patient was engaged in a memory recall task that required her to complete common sayings given the intial part of the phrase. Patient completed activity with fair+ outcome benefiting from min v/c and repetition of beginning of phrases. Patient did not want to engage in oral motor exercises during session. However, patient was educated on the exercises (benefits & directions) and was provided written instructions.        [facility] [I63.9]    Total minutes :  30 minutes    Sabrina Molina  SLP Student Intern

## 2021-08-04 NOTE — PROGRESS NOTES
Case reviewed with Dr. Mindy De Los Santos, patient is appropriate for acute rehab. Will start precert.

## 2021-08-04 NOTE — PROGRESS NOTES
Physical Therapy  Treatment Note     Name: Karena Baum  : 1960  MRN: [id number]      Date of Service: 2021    Evaluating PT:  Annamarie Duque, PT, DPT [id number]    Room #:  5669/6726-J  Diagnosis:  Acute CVA (cerebrovascular accident) [facility]) [I63.9]  PMHx/PSHx:  UTI, DM, HLD, LDDD, numbness and tingling, postural dizziness, C DDD, total nephrectomy 2016, tobacco abuse  Precautions:  Falls, cognition, L visual field deficits  Equipment Needs:  SPC      Attempted to see pt in pm and pt declined.    Pt wanting to rest.    Colton Oliveira, [address]

## 2021-08-05 ENCOUNTER — TELEPHONE (OUTPATIENT)
Dept: PRIMARY CARE CLINIC | Age: 61
End: 2021-08-05

## 2021-08-05 VITALS
SYSTOLIC BLOOD PRESSURE: 126 MMHG | BODY MASS INDEX: 33.26 KG/M2 | HEART RATE: 77 BPM | TEMPERATURE: 97.2 F | OXYGEN SATURATION: 97 % | HEIGHT: 65 IN | WEIGHT: 199.6 LBS | DIASTOLIC BLOOD PRESSURE: 76 MMHG | RESPIRATION RATE: 16 BRPM

## 2021-08-05 LAB
METER GLUCOSE: 136 MG/DL (ref 74–99)
METER GLUCOSE: 230 MG/DL (ref 74–99)

## 2021-08-05 PROCEDURE — 97110 THERAPEUTIC EXERCISES: CPT

## 2021-08-05 PROCEDURE — 82962 GLUCOSE BLOOD TEST: CPT

## 2021-08-05 PROCEDURE — 97535 SELF CARE MNGMENT TRAINING: CPT

## 2021-08-05 PROCEDURE — 97530 THERAPEUTIC ACTIVITIES: CPT

## 2021-08-05 PROCEDURE — 6370000000 HC RX 637 (ALT 250 FOR IP): Performed by: INTERNAL MEDICINE

## 2021-08-05 PROCEDURE — 6370000000 HC RX 637 (ALT 250 FOR IP): Performed by: PSYCHIATRY & NEUROLOGY

## 2021-08-05 PROCEDURE — 97129 THER IVNTJ 1ST 15 MIN: CPT

## 2021-08-05 RX ORDER — CLOPIDOGREL BISULFATE 75 MG/1
75 TABLET ORAL DAILY
Qty: 30 TABLET | Refills: 0 | Status: SHIPPED | OUTPATIENT
Start: 2021-08-06 | End: 2021-08-06 | Stop reason: SDUPTHER

## 2021-08-05 RX ORDER — ATORVASTATIN CALCIUM 40 MG/1
40 TABLET, FILM COATED ORAL NIGHTLY
Qty: 30 TABLET | Refills: 0 | Status: SHIPPED | OUTPATIENT
Start: 2021-08-05 | End: 2021-08-27 | Stop reason: SDUPTHER

## 2021-08-05 RX ADMIN — GLIPIZIDE 5 MG: 5 TABLET ORAL at 06:34

## 2021-08-05 RX ADMIN — ASPIRIN 81 MG: 81 TABLET, COATED ORAL at 08:27

## 2021-08-05 RX ADMIN — CLOPIDOGREL BISULFATE 75 MG: 75 TABLET ORAL at 08:27

## 2021-08-05 ASSESSMENT — PAIN SCALES - GENERAL: PAINLEVEL_OUTOF10: 0

## 2021-08-05 NOTE — PLAN OF CARE
Neuro plan of care:    Notes reviewed--unfortunately, she insurance denied ARU and she will most likely go home with OP therapy. No other new neuro events overnight    Echo w bubble: Normal left ventricle size and systolic function. Ejection fraction is visually estimated at 55-60%. No regional wall motion abnormalities seen. Normal left ventricle wall thickness. Indeterminate diastolic function. No evidence of patent foramen ovale on bubble study. Normal right ventricular size and function. TAPSE 27 mm. No hemodynamically significant aortic stenosis is present. Unable to estimate PA systolic pressure. No evidence for hemodynamically significant pericardial effusion.     Plan:    Recommend additional cardioembolic workup as OP    Rest per last progress note    Neuro signing off--call with new issues    Follow up in stroke clinic    Maria Victoria Arboleda, CORINA - CNP  10:54 AM

## 2021-08-05 NOTE — CARE COORDINATION
TODD called to speak with patients wife, Melodie Birch via phone. Informed her that patient was denied acute rehab with insurance. She states understanding. She states she can take patient home and has a friend that will help at home as well. We discussed [facility] vs Outpatient therapy and she has decided on outpatient therapy . She states she is familiar with [facility] and can arrange for transport to and from therapy for patient. TODD informed her to call Dr Deangelo Jacinto office for a script for outpatient therapy. She states understanding. Notified her that  will most likely discharge today, and she states she can  after 4:30 when she is done with work.

## 2021-08-05 NOTE — CARE COORDINATION
Spoke with therapy and they are recommending at [employer] for the patient at discharge for stability. Spoke with the patient at the bedside. She is agreeable and has no preference for provider as long as they bring it to the room. Call placed to Brittanie Real, liaison with [facility] and referral made. She will review the case and bring the [employer] up to the room prior to the patient's discharge. Guille Petty RN.  P:  [phone]    The Plan for Transition of Care is related to the following treatment goals: Improve patient's ability to ambulate safely at home. The Patient and/or patient representative was provided with a choice of provider and agrees with the discharge plan. [x] Yes [] No    Freedom of choice list was provided with basic dialogue that supports the patient's individualized plan of care/goals, treatment preferences and shares the quality data associated with the providers.  [x] Yes [] No

## 2021-08-05 NOTE — PROGRESS NOTES
Pt seen for dysarthria and cognition. Recall of recent events required min cues. STM task required verbal cues. Recall of personal information was accurate. Mid delay in problem solving and sequencing tasks. Oral motor exercises completed fair.    Will continue

## 2021-08-05 NOTE — CARE COORDINATION
Spoke with Vanessa Harris at acute, precert pending for acute rehab. Await auth. Have given Dry covid test to charge RN. If patient is not approved for acute rehab, wife Juliet plans to take patient home with Outpatient therapy.

## 2021-08-05 NOTE — PROGRESS NOTES
Patient seen and examined. Patient appropriate for ARU once medically cleared and all workup complete. Full consult to follow.      Geo Garcia MD  Physical Medicine and Rehabilitation

## 2021-08-05 NOTE — PROGRESS NOTES
Physical Therapy  Treatment Note     Name: Evangelist Maravilla  : 1960  MRN: [id number]      Date of Service: 2021    Evaluating PT:  Larissa Laboy, PT, DPT [id number]    Room #:  5425/5747-U  Diagnosis:  Acute CVA (cerebrovascular accident) [facility]) [I63.9]  PMHx/PSHx:  UTI, DM, HLD, LDDD, numbness and tingling, postural dizziness, C DDD, total nephrectomy 2016, tobacco abuse  Precautions:  Falls, cognition, L visual field deficits  Equipment Needs:  ww     SUBJECTIVE:    Pt lives with wife in a trailer home with 3 stairs to enter and 2 rail. Pt ambulated with SPC David PTA. Pt reports independence for all ADLs and mobility PTA. OBJECTIVE:   Initial Evaluation  Date: 2021 Treatment  Date: 2021 Short Term/ Long Term   Goals   AM-PAC 6 Clicks 97/41 97/32    Was pt agreeable to Eval/treatment? yes yes    Does pt have pain? No c/o pain No c/o pain    Bed Mobility  Rolling: Kirstie  Supine to sit: Kirstie  Sit to supine: Kirstie  Scooting: Kirstie SBA all aspects Rolling: Independent  Supine to sit: Independent  Sit to supine: Independent  Scooting: Independent   Transfers Sit to stand: Kirstie  Stand to sit: Kirstie  Stand pivot: Kirstie [facility] Sit<>stand: SBA  Stand pivot:  ww sba with cues  Cane cga with cues  Sit to stand: Independent  Stand to sit: Independent  Stand pivot: David [facility]   Ambulation    30 feet and 50 feet with [facility] Kirstie 75 feet SPC cga/ SBA  50 feet with spcane   75 feet ww sba cues for walker approximation  150 feet with [facility] David   Stair negotiation: ascended and descended  4 steps with 1 rail Kirstie 4 steps with 1 rail and SPC min/cga cues for cane placement  4 steps with 1 rail David   ROM BUE:  Per OT note  BLE:  WNL       Strength BUE:  Per OT note  BLE:  WNL     Balance Sitting EOB:  SBA  Dynamic Standing:  Kirstie [facility] Sitting EOB: SBA  Dynamic standing: SBA [facility] Sitting EOB:  Independent  Dynamic Standing:  David [facility]     Pt is A & O x 4. Flat affect.   Sensation:  Pt denies numbness and tingling to extremities  Edema:  unremarkable    Patient education  Walker technique   Safety   Patient response to education:   Pt verbalized understanding Pt demonstrated skill Pt requires further education in this area   yes yes yes     ASSESSMENT:    Comments:  RN cleared pt for activity prior to session. Pt in bed. Pt has flat affect slow moving. Pt ambulated with cane and pt had some unsteadiness and pt has some left neglect. Pt required cues for technique. Pt used ww and admits more steady with ww. Pt required cues for walker approx. At times pt closing eyes and cues to open eyes and increase her speed. Told pt would recommend ww and 24 hour assist .   SW notified and nursing notified  Treatment:  Patient practiced and was instructed in the following treatment:     Therapeutic Activities Completed:  o Functional mobility as noted above:   - Bed mobility: SBA all aspects.   - Transfer training: SBA all aspects. Trialled [facility] as is pt's preference. She required mod VC for safe and proper sequencing but completed without need for physical assistance. - Ambulation: 75 feet SPC SBA x 2 reps. Min VC for proper sequencing for safety.    - Stair Trainin steps with 1 rail SBA. Cues for NR patterning and proper sequencing with SPC. o Skilled repositioning in seated position for comfort.  o Pt education as noted above. PLAN:    Patient is making fair progress towards established goals. Will continue with current POC.       Time in  300 Time out  330    Total Treatment Time  30  minutes     CPT codes:  [] Gait training 45514 0 minutes  [] Manual therapy 94521 0 minutes  [x] Therapeutic activities 39202 30 minutes  [] Therapeutic exercises 26324 0 minutes  [] Neuromuscular reeducation 59896 0 minutes    Lane Perez, PTA  [id number]

## 2021-08-05 NOTE — TELEPHONE ENCOUNTER
Scheduled hospital follow up for stroke. Patient in need of OP therapy.   Would like order sent to [facility]

## 2021-08-05 NOTE — PROGRESS NOTES
Occupational Therapy  OT BEDSIDE TREATMENT NOTE   [facility] [address]  [address] [place] [address] [facility]  Patient Name: Emmie Guerra  MRN: [id number]  : 1960  Room: [address] [place] [address]     Per OT Eval:    Evaluating OT: Jamia Obrien, [address] OTR/L; [id number]       Referring Provider: Bertha Rodriguez DO    Specific Provider Orders/Date: OT Eval and Treat 21       Diagnosis: Acute CVA    Surgery: none     Pertinent Medical History: cancer of kidney (), Vasovagal syncope (), DM2, Hyperlipidemia, Neuropathy, Lumbar degenerative disc disease, hx of postural dizziness and neurologic gait dysfunction (), multiple lacunar infarcts (3/2021), DDD, total nephrectomy ()  Recommended Adaptive Equipment: TBD       Precautions:  Fall Risk, cognition, L visual field deficits      Assessment of current deficits    [x] Functional mobility            [x]ADLs           [x] Strength                  [x]Cognition    [x] Functional transfers          [x] IADLs         [x] Safety Awareness   [x]Endurance    [x] Fine Coordination                         [x] Balance      [x] Vision/perception   [x]Sensation      []Gross Motor Coordination             [] ROM           [] Delirium                   [] Motor Control      OT PLAN OF CARE   OT POC based on physician orders, patient diagnosis and results of clinical assessment      Frequency/Duration: 3-5 days/wk for 2 weeks PRN   Specific OT Treatment Interventions to include:   * Instruction/training on adapted ADL techniques and AE recommendations to increase functional independence within precautions       * Training on energy conservation strategies, correct breathing pattern and techniques to improve independence/tolerance for self-care routine  * Functional transfer/mobility training/DME recommendations for increased independence, safety, and fall prevention  * Patient/Family education to increase follow through with safety techniques and functional independence  * Recommendation of environmental modifications for increased safety with functional transfers/mobility and ADLs  * Cognitive retraining/development of therapeutic activities to improve problem solving, judgement, memory, and attention for increased safety/participation in ADL/IADL tasks  * Therapeutic exercise to improve motor endurance, ROM, and functional strength for ADLs/functional transfers  * Therapeutic activities to facilitate/challenge dynamic balance, stand tolerance for increased safety and independence with ADLs     Modified [place] Scale (MRS)  Score     Description  0             No symptoms  1             No significant disability despite symptoms  2             Slight disability; able to look after own affairs  3             Moderate disability; able to ambulate without assist/ requires assist with ADLs  4             Moderate/Severe disability;requires assist to ambulate/assist with ADLs  5             Severe disability;bedridden/incontinent   6               Score: 4     Home Living: Pt lives with wife in [facility], 1 story home, with 3 stairs and bilateral handrails to enter.    Bathroom setup: tub/shower unit with shower chair, safety frame over commode  Equipment owned: cane, shower chair, safety frame     Prior Level of Function: independent with ADLs , assist from wife with IADLs as needed; ambulated using SPC occasionally/as needed     Pain Level: no pain   Cognition: A&O: 2-3/4 - oriented to self and location -stating January for month, able to recall year   Follows 1-2 step directions, pleasant & cooperative               Memory: LTM fair  Poor STM 0/3 recall               Sequencing:  fair              Problem solving:  fair              Judgement/safety:  fair-                Functional Assessment:  AM-PAC Daily Activity Raw Score:     Initial Eval Status  Date: 21 Treatment Status  Date:  21 STGs = LTGs  Time frame: 10-14 days   Feeding Stand by Assist   Opening packages/tray set up  Mod Indep  Seated upright in chair IND   Grooming Minimal Assist   Hand hygiene standing sink side CGA  Standing at sink to wash hands & face Independent    UB Dressing Minimal Assist   Simulated EOB Min A  Ranjit/doff hospital gown seated Independent    LB Dressing Maximal Assist   Donning/doffing socks seated EOB  Posterior LOB during task   sequencing deficits SBA-socks  Pt donned/doffed hospital socks using cross over technique seated upright in chair    Min A-dressing  Doff/ranjit brief, assist off & over feet Goal Update 8/4   IND   Bathing Moderate Assist  D/t sequencing deficit  And balance deficit Min A  Completed sponge bathing seated Goal Update 8/4   IND   Toileting Moderate Assist  For safety d/t balance during pericare  Sequencing task SBA  With hygiene & clothing management Stand by Assist    Bed Mobility  Log Roll: Min A  Supine to sit: Minimal Assist   Sit to supine: Minimal Assist  Min A  Supine to sit  With pt reaching for therapist arm to pull herself upright  Supine to sit:  Independent   Sit to supine: Independent    Functional Transfers Sit to stand:Min A   Stand to sit:Min A  Stand pivot: Min A  Commode: Min A Min-CGA  Cueing for hand placement   Sit to stand:IND   Stand to sit:IND  Stand pivot: IND  Commode: IND   Functional Mobility Min A with ww  To bathroom Min A  Hand held assist, pt prefers SC, declined use of walker for in room distances  Moderate [place] with ww/cane   Balance Sitting:     Static - SBA     Dynamic - Min A  Standing: Min A Sitting EOB:   SBA    Standing:   CGA/Min A  Mildly unsteady      Sitting:  Static: IND  Dynamic: IND  Standing: IND   Activity Tolerance Fair  Fair Good   Visual/  Perceptual Glasses: yes - reading only     L side visual field deficits - difficulty managing room obstacles on left side during functional mobility from bathroom to bed    Left inattention noted  Left deficits                Education:  Pt was educated through out treatment regarding proper technique & safety with bed mobility, functional transfers & mobility along with ADL compensatory strategies to ease tasks, improve safety & prevent falls to return home safely. Comments: Upon arrival pt supine in bed, agreeable to therapy. At end of session, pt seated upright in chair, tray table in front with breakfast tray, pt able to open packets & manage utensils, all tubes and lines intact, call light within reach, nursing aware. · Pt has made good progress towards set goals. · Continue with current plan of care focusing on increasing of independency with transfers and ADL tasks    Treatment Time In: 8:40am           Treatment Time Out: 9:20am           Treatment Charges: Mins Units   Ther Ex  71531     Manual Therapy 82886     Thera Activities 78578 10 1   ADL/Home Mgt 38462 30 2   Neuro Re-ed 08098     Group Therapy      Orthotic manage/training  31240     Non-Billable Time     Total Timed Treatment 40 3       Sherry YANEZ  [address] [place] [address], [address] [place] [address]

## 2021-08-05 NOTE — PLAN OF CARE
Problem: Falls - Risk of:  Goal: Will remain free from falls  Description: Will remain free from falls  Outcome: Met This Shift  Goal: Absence of physical injury  Description: Absence of physical injury  Outcome: Met This Shift     Problem: Skin Integrity:  Goal: Will show no infection signs and symptoms  Description: Will show no infection signs and symptoms  Outcome: Met This Shift  Goal: Absence of new skin breakdown  Description: Absence of new skin breakdown  Outcome: Met This Shift     Problem: HEMODYNAMIC STATUS  Goal: Patient has stable vital signs and fluid balance  Outcome: Met This Shift     Problem: ACTIVITY INTOLERANCE/IMPAIRED MOBILITY  Goal: Mobility/activity is maintained at optimum level for patient  Outcome: Met This Shift     Problem: COMMUNICATION IMPAIRMENT  Goal: Ability to express needs and understand communication  Outcome: Met This Shift

## 2021-08-06 ENCOUNTER — CARE COORDINATION (OUTPATIENT)
Dept: CARE COORDINATION | Age: 61
End: 2021-08-06

## 2021-08-06 ENCOUNTER — OFFICE VISIT (OUTPATIENT)
Dept: PRIMARY CARE CLINIC | Age: 61
End: 2021-08-06
Payer: COMMERCIAL

## 2021-08-06 VITALS
SYSTOLIC BLOOD PRESSURE: 127 MMHG | TEMPERATURE: 97.9 F | WEIGHT: 190 LBS | BODY MASS INDEX: 31.62 KG/M2 | DIASTOLIC BLOOD PRESSURE: 78 MMHG

## 2021-08-06 DIAGNOSIS — E78.2 MIXED HYPERLIPIDEMIA: Chronic | ICD-10-CM

## 2021-08-06 DIAGNOSIS — I63.89 CEREBRAL INFARCTION DUE TO OTHER MECHANISM (HCC): Primary | ICD-10-CM

## 2021-08-06 DIAGNOSIS — E11.9 TYPE 2 DIABETES MELLITUS WITHOUT COMPLICATION, WITHOUT LONG-TERM CURRENT USE OF INSULIN (HCC): Chronic | ICD-10-CM

## 2021-08-06 DIAGNOSIS — I63.81 MULTIPLE LACUNAR INFARCTS (HCC): Chronic | ICD-10-CM

## 2021-08-06 PROCEDURE — 99496 TRANSJ CARE MGMT HIGH F2F 7D: CPT | Performed by: FAMILY MEDICINE

## 2021-08-06 PROCEDURE — 1111F DSCHRG MED/CURRENT MED MERGE: CPT | Performed by: FAMILY MEDICINE

## 2021-08-06 RX ORDER — CLOPIDOGREL BISULFATE 75 MG/1
75 TABLET ORAL DAILY
Qty: 90 TABLET | Refills: 5 | Status: SHIPPED | OUTPATIENT
Start: 2021-08-06

## 2021-08-06 ASSESSMENT — PATIENT HEALTH QUESTIONNAIRE - PHQ9
SUM OF ALL RESPONSES TO PHQ QUESTIONS 1-9: 0
1. LITTLE INTEREST OR PLEASURE IN DOING THINGS: 0
SUM OF ALL RESPONSES TO PHQ QUESTIONS 1-9: 0
SUM OF ALL RESPONSES TO PHQ9 QUESTIONS 1 & 2: 0
2. FEELING DOWN, DEPRESSED OR HOPELESS: 0
SUM OF ALL RESPONSES TO PHQ QUESTIONS 1-9: 0

## 2021-08-06 NOTE — CARE COORDINATION
[facility] 45 Transitions Initial Follow Up Call    Call within 2 business days of discharge: Yes    Patient: Shashi Powell Patient : 1960   MRN: <[id number]>  Reason for Admission: - Acute CVA  Discharge Date: 21 RARS: Readmission Risk Score: 12      Last Discharge [facility]       Complaint Diagnosis Description Type Department Provider    21  Acute CVA (cerebrovascular accident) [facility]) Admission (Discharged) [facility] 8SE [address] [facility],            Spoke with: Attempted to contact patient for Initial Care Transition call. Left HIPPA compliant message requesting return call. Will attempt to reach again. JILLIAN Ha / [address]  [phone]      Facility: [facility] 6 Transitions 24 Hour Call    Care Transitions Interventions         Follow Up  Future Appointments   Date Time Provider Adina Izquierdo   2021 10:30 AM [address], DO [place] [facility]   10/26/2021 10:15 AM DO YSAB[facility]       Blanka Matthews LPN

## 2021-08-09 ENCOUNTER — CARE COORDINATION (OUTPATIENT)
Dept: CARE COORDINATION | Age: 61
End: 2021-08-09

## 2021-08-09 NOTE — CARE COORDINATION
[facility] 45 Transitions Initial Follow Up Call    Call within 2 business days of discharge: Yes    Patient: Mohit Teran Patient : 1960   MRN: <[id number]>  Reason for Admission: -21 Acute CVA  Discharge Date: 21 RARS: Readmission Risk Score: 12      Last Discharge [facility]       Complaint Diagnosis Description Type Department Provider    21  Acute CVA (cerebrovascular accident) [facility]) Admission (Discharged) [facility] 8SE [address] [facility], DO           Spoke with: Second attempt made to contact patient for Initial Care Transition call. Left HIPPA compliant message requesting return call. Will attempt to reach again.     JILLIAN Ha / [address]  [phone]    Facility: [facility] 6 Transitions 24 Hour Call    Care Transitions Interventions         Follow Up  Future Appointments   Date Time Provider Adina Izquierdo   2021 11:00 AM Nell Li PT L[facility] MCKEON   2021 10:30 AM [facility]EL [facility]   10/26/2021 10:15 AM DO YSABEL [facility]       Blanka Matthews LPN

## 2021-08-20 ENCOUNTER — HOSPITAL ENCOUNTER (OUTPATIENT)
Dept: PHYSICAL THERAPY | Age: 61
Setting detail: THERAPIES SERIES
Discharge: HOME | End: 2021-08-20
Payer: COMMERCIAL

## 2021-08-20 PROCEDURE — 97161 PT EVAL LOW COMPLEX 20 MIN: CPT | Performed by: PHYSICAL THERAPIST

## 2021-08-20 NOTE — PROGRESS NOTES
Physical Therapy  Initial Assessment  Date: 2021  Patient Name: Shashi Powell  MRN: [id number]  : 1960          Restrictions       Subjective   General  Chart Reviewed: Yes  Referring Practitioner: Angle Alcazar  Diagnosis: I63.89 (ICD-10-CM) - Cerebral infarction due to other mechanism ([facility])I63.81 (ICD-10-CM) - Multiple lacunar infarcts ([facility] [address]  Follows Commands: Within Functional Limits  PT Visit Information  PT Insurance Information: BCBS  Total # of Visits to Date: 1  Subjective  Subjective: Patient presents to PT with recent CVA. CVA occurred on 21. She reports having \"mini strokes\" prior to this occurrence. Patient states cont to feel foggy. Patient is currently ambulting with SC. She does admit to using SC intermittently prior to stroke. She cont to have feeling of unsteadiness however no recent falls. She admits to neuropathy with tingling sensation. She denies any pain. No apparent strength loss per pt.   Pain Screening  Patient Currently in Pain: Denies  Vital Signs  Patient Currently in Pain: Denies    Objective     Observation/Palpation  Posture: Fair (rounded posturing)  Palpation: no pain with palpation  Observation: flat affect noted  Edema: no obvious edema    AROM RLE (degrees)  RLE AROM: WFL  AROM LLE (degrees)  LLE AROM : WFL  AROM RUE (degrees)  RUE AROM : WFL  AROM LUE (degrees)  LUE AROM : WFL    Strength RLE  Strength RLE: WFL  Strength LLE  Strength LLE: WFL  Strength RUE  Strength RUE: WFL  Strength LUE  Strength LUE: WFL        Sensation  Overall Sensation Status: WFL             Ambulation  Ambulation?: Yes  Ambulation 1  Surface: level tile  Device: Single point cane  Assistance: Modified Independent  Quality of Gait: Patient ambulates with SC; slowed laya with flat foot contact bilaterally; med/lateral trunk deviation present     Assessment   Conditions Requiring Skilled Therapeutic Intervention  Body structures, Functions, Activity limitations: Decreased functional mobility ;Decreased posture;Decreased balance  Assessment: Patient presents to PT s/p CVA; AROM/strength of UEs is [place]; limited balance/gait noted  Prognosis: Fair  Decision Making: Low Complexity  REQUIRES PT FOLLOW UP: Yes         Plan   Plan  Times per week: 2x/week x 6 weeks  Current Treatment Recommendations: Strengthening, Balance Training, Functional Mobility Training, Gait Training, Neuromuscular Re-education, Home Exercise Program, Safety Education & Training, Patient/Caregiver Education & Training      OutComes Score  Balance Score: 12 (08/20/21 1352)  Gait Score: 8 (08/20/21 1352)        Tinetti Total Score: 20 (08/20/21 [facility])       Goals  Short term goals  Time Frame for Short term goals: 3 weeks  Short term goal 1: increase balance to > 21/28 tinetti to improve functional mobility and ADL functions  Short term goal 2: increase amnbulation with pt demonstrating heel-toe progression with use of SC  Long term goals  Time Frame for Long term goals : 6 weeks  Long term goal 1: increase balance to > 23/28 tinetti allowing independence with all functional mobility and ADLs  Long term goal 2: increase amnbulation with pt demonstrating proper heel-toe mechanics with/without AD  Long term goal 3: pt demonstrates independence with HEP  Patient Goals   Patient goals : to improve balance/gait       Therapy Time   Individual Concurrent Group Co-treatment   Time In 1330         Time Out 1400         [facility]   T: [phone]   F: [phone]     If you have any questions or concerns, please don't hesitate to call. Thank you for your referral.    Physician Signature:________________________________Date:__________________  By signing above, therapists plan is approved by physician. All patients under [place]/ Medicare Insurance   must be signed by physician.

## 2021-08-27 ENCOUNTER — OFFICE VISIT (OUTPATIENT)
Dept: PRIMARY CARE CLINIC | Age: 61
End: 2021-08-27
Payer: COMMERCIAL

## 2021-08-27 VITALS
TEMPERATURE: 98 F | SYSTOLIC BLOOD PRESSURE: 132 MMHG | DIASTOLIC BLOOD PRESSURE: 78 MMHG | WEIGHT: 192 LBS | BODY MASS INDEX: 31.95 KG/M2

## 2021-08-27 DIAGNOSIS — E11.9 TYPE 2 DIABETES MELLITUS WITHOUT COMPLICATION, WITHOUT LONG-TERM CURRENT USE OF INSULIN (HCC): Chronic | ICD-10-CM

## 2021-08-27 DIAGNOSIS — I63.81 MULTIPLE LACUNAR INFARCTS (HCC): Chronic | ICD-10-CM

## 2021-08-27 DIAGNOSIS — C64.2 MALIGNANT NEOPLASM OF LEFT KIDNEY (HCC): Chronic | ICD-10-CM

## 2021-08-27 DIAGNOSIS — R26.9 NEUROLOGIC GAIT DYSFUNCTION: Chronic | ICD-10-CM

## 2021-08-27 DIAGNOSIS — E78.2 MIXED HYPERLIPIDEMIA: Primary | ICD-10-CM

## 2021-08-27 PROCEDURE — 99214 OFFICE O/P EST MOD 30 MIN: CPT | Performed by: FAMILY MEDICINE

## 2021-08-27 RX ORDER — ATORVASTATIN CALCIUM 40 MG/1
40 TABLET, FILM COATED ORAL NIGHTLY
Qty: 90 TABLET | Refills: 5 | Status: SHIPPED | OUTPATIENT
Start: 2021-08-27

## 2021-08-27 ASSESSMENT — ENCOUNTER SYMPTOMS
RESPIRATORY NEGATIVE: 1
GASTROINTESTINAL NEGATIVE: 1
EYES NEGATIVE: 1
ALLERGIC/IMMUNOLOGIC NEGATIVE: 1

## 2021-08-27 NOTE — PROGRESS NOTES
21  Name: Emmie Guerra    : 1960    Sex: female    Age: 57 y.o. Subjective:  Chief Complaint: Patient is here for 3 week ck  Re  cva    bp chol   diab     In room alone  Friend  Drove   -    Gf    Tells pt    That whe feels patient is less foggy   Weak   Walks with cane  No  Cp or sob      Still smoing  Left sided   Weakness little better but stil problem    Walks with cane   Uses  With right hand-  Not in PT now  isnurance deneid any more      Review of Systems   Constitutional: Negative. HENT: Negative. Eyes: Negative. Respiratory: Negative. Cardiovascular: Negative. Gastrointestinal: Negative. Endocrine: Negative. Genitourinary: Negative. Musculoskeletal: Negative. Skin: Negative. Allergic/Immunologic: Negative. Neurological:        See   hpi   Hematological: Negative. Psychiatric/Behavioral: Negative.           Current Outpatient Medications:     atorvastatin (LIPITOR) 40 MG tablet, Take 1 tablet by mouth nightly, Disp: 90 tablet, Rfl: 5    clopidogrel (PLAVIX) 75 MG tablet, Take 1 tablet by mouth daily, Disp: 90 tablet, Rfl: 5    aspirin 81 MG EC tablet, Take 81 mg by mouth daily, Disp: , Rfl:     acetaminophen (TYLENOL) 325 MG tablet, Take 650 mg by mouth every 6 hours as needed for Pain, Disp: , Rfl:     vitamin D (ERGOCALCIFEROL) 1.25 MG (01870 UT) CAPS capsule, Take 1 capsule by mouth once a week, Disp: 12 capsule, Rfl: 5    glipiZIDE (GLUCOTROL) 5 MG tablet, Take 1 tablet by mouth 2 times daily (before meals), Disp: 180 tablet, Rfl: 5  Allergies   Allergen Reactions    Codeine     Pyridium [Phenazopyridine Hcl]      Social History     Socioeconomic History    Marital status:      Spouse name: Not on file    Number of children: Not on file    Years of education: Not on file    Highest education level: Not on file   Occupational History    Not on file   Tobacco Use    Smoking status: Current Every Day Smoker     Packs/day: 1.00 Years: 30.00     Pack years: 30.00     Types: Cigarettes    Smokeless tobacco: Never Used   Substance and Sexual Activity    Alcohol use: No    Drug use: No    Sexual activity: Yes     Partners: Female   Other Topics Concern    Not on file   Social History Narrative        DIABETES--DX 1-09    SMOKER    WEIGHT    OA    CHRONIC LOW BACK PAIN    BULGE LUMBAR DISC----WC WITH DR GARY    ELEV TSH IN PAST    LIPID    AFTERNOON SHIFT [employer] MOLDED PRODUCTS    SEVERE NON COMPLIANCE    OBESITY    BRO  2013 AGE 47 MI--SMOKER---OBESE    ELEV WBC 12-14    ELEV CREA 12-14    PROTEINURIA    UTI 1-16    [employer] MOLDED CO    ER WITH URINE RETENTION -16-----FLEY--UROL    TOTAL L;EFT NEPHRECTOMY ---RENAL CELL CARCINOMA----- - DR OLIVA----TWO    MASS LEFT KIDNEY----CYST ON RIGHT KIDNEY    Boss  [place]    Unsteady gait, multifocused CVA, seeing Dr. Joaquim Grant. Surgical consult     Mri    brain  3-21 iwht  radha of old  cva----referred dr Carol Ann Victoria   with dr schreiber with  two diff neuroapthy---    Cva  speech left sided weakness        Social Determinants of Health     Financial Resource Strain:     Difficulty of Paying Living Expenses:    Food Insecurity:     Worried About Running Out of Food in the Last Year:     [address] in the Last Year:    Transportation Needs:     Lack of Transportation (Medical):      Lack of Transportation (Non-Medical):    Physical Activity:     Days of Exercise per Week:     Minutes of Exercise per Session:    Stress:     Feeling of Stress :    Social Connections:     Frequency of Communication with Friends and Family:     Frequency of Social Gatherings with Friends and Family:     Attends Temple Services:     Active Member of Clubs or Organizations:     Attends Club or Organization Meetings:     Marital Status:    Intimate Partner Violence:     Fear of Current or Ex-Partner:     Emotionally Abused:     Physically Abused:     Sexually Abused: Past Medical History:   Diagnosis Date    Bone marrow edema 03/25/2021    Abnl. C-spine MRI wo/mikel. Patient refused MR C-spien with contrast, c/o out-of-pocket cost of other MRI's    DDD (degenerative disc disease), cervical 03/25/2021    Diabetes mellitus ([facility]     Hyperlipidemia     Lumbar degenerative disc disease 03/15/2021    Multiple lacunar infarcts ([facility] 03/25/2021    Numbness and tingling 03/15/2021    Postural dizziness 03/15/2021    UTI (urinary tract infection)      No family history on file. Past Surgical History:   Procedure Laterality Date    TOTAL NEPHRECTOMY Left 09/27/2016      Vitals:    08/27/21 1028   BP: 132/78   Temp: 98 °F (36.7 °C)   TempSrc: Oral   Weight: 192 lb (87.1 kg)       Objective:    Physical Exam  Vitals reviewed. Constitutional:       Appearance: She is well-developed. HENT:      Head: Normocephalic. Eyes:      Pupils: Pupils are equal, round, and reactive to light. Cardiovascular:      Rate and Rhythm: Normal rate and regular rhythm. Pulmonary:      Effort: Pulmonary effort is normal.      Breath sounds: Normal breath sounds. Abdominal:      Palpations: Abdomen is soft. Musculoskeletal:         General: Normal range of motion. Cervical back: Normal range of motion. Skin:     General: Skin is warm. Neurological:      Mental Status: She is alert and oriented to person, place, and time. Comments: Left sided   weakenss    Dec   dtr  Left knee and  wrist   Psychiatric:         Behavior: Behavior normal.         Diagnoses and all orders for this visit:    Mixed hyperlipidemia  -     atorvastatin (LIPITOR) 40 MG tablet;  Take 1 tablet by mouth nightly    Malignant neoplasm of left kidney (HCC)    Type 2 diabetes mellitus without complication, without long-term current use of insulin (HCC)    Neurologic gait dysfunction    Multiple lacunar infarcts [facility])        Comments:  Diet e xer    Fu iwthneuro  At [address] she clled them and  nt need To follow up  Plan lab next ov      Aggressive low-fat diet. Avoid red meats, greasy fried foods, dairy products. Avoid processed foods. Take cholesterol medications without food. Check blood sugars 4 times a day. Aggressive low, sugar low carbohydrate diet. Call if blood sugar less than 70 or over 200. Avoid eating in between meals. Lose weight. Exercise. A great deal of time spent reviewing medications, diet, exercise, social issues. Also reviewing the chart before entering the room with patient and finishing charting after leaving patient's room. More than half of that time was spent face to face with the patient in counseling and coordinating care. Follow Up: Return for give new card for oct appt with me.      Seen by:  Patrice Nino DO

## 2021-09-02 ENCOUNTER — TREATMENT (OUTPATIENT)
Dept: PHYSICAL THERAPY | Age: 61
End: 2021-09-02
Payer: COMMERCIAL

## 2021-09-02 DIAGNOSIS — R26.9 GAIT DISTURBANCE: ICD-10-CM

## 2021-09-02 DIAGNOSIS — R26.2 DIFFICULTY WALKING: Primary | ICD-10-CM

## 2021-09-02 PROCEDURE — 97530 THERAPEUTIC ACTIVITIES: CPT | Performed by: PHYSICAL THERAPIST

## 2021-09-02 PROCEDURE — 97110 THERAPEUTIC EXERCISES: CPT | Performed by: PHYSICAL THERAPIST

## 2021-09-03 NOTE — PROGRESS NOTES
[address] [facility]   Phone: [phone]   Fax: [phone]      Physical Therapy Daily Treatment Note    Date: 2021  Patient Name: Shashi Powell  : 1960   MRN: [id number]  DOInjury:    DOSx: NA  Referring Provider: Marialuisa Almaguer, DO  [address] [facility]  [place],  [facility]     Medical Diagnosis:     CVA    Outcome Measure:     S: pt c/o weakness    O:   Time [id number]     Visit 1 Repeat outcome measure at mid point and end. Pain      Strength      Palpation      ROM      Modalities            Manual            Stretch      IT band supine      HS supine      Quad Prone      Exercise      Bike      Seated ball squeeze x30 Hip add TE   Seated clamshell x30 Blue band TE   Seated marching x30 Blue band TE   Seated leg curl x30 R & L Blue band TE   LAQ x30 R & L Blue band TE   Hamstring Curl -setaed x30 R & L Loop blue band around ankles TE   Standing heel raises x20  TA   Standing marching x20  TA   Standing leg curls x20  TA   AMB forward/backward allong railing x20  TA   AMB side-stepping along railing x20  TA     NMR To improve balance for safe community and home ambulation    Resisted walk      FWD      BKWD      lat      March      Side stepping      Retro walk      Heel to toe      A:  Tolerated well.   Pt progressed to perform seated & standing exercise program.  P: Continue with rehab plan    Tessy Cox, PT [address]    Treatment Charges: Mins Units   Initial Evaluation     Re-Evaluation     Ther Exercise         TE 16 1   Manual Therapy     MT     Ther Activities        TA 24 2   Gait Training          GT     Neuro Re-education NR     Modalities     Non-Billable Service Time     Other     Total Time/Units 40 3

## 2021-09-09 ENCOUNTER — TREATMENT (OUTPATIENT)
Dept: PHYSICAL THERAPY | Age: 61
End: 2021-09-09

## 2021-09-14 NOTE — DISCHARGE SUMMARY
[address]                  [address] [facility],  [facility]                               DISCHARGE SUMMARY    PATIENT NAME: Juanito Riggs                    :        1960  MED REC NO:   [id number]                            ROOM:       8501  ACCOUNT NO:   [de-identified]                           ADMIT DATE: 2021  PROVIDER:     Jasmine Haq DO                  DISCHARGE DATE:  2021    DISCHARGE DIAGNOSES:  Acute CVA, hyperlipidemia, diabetes mellitus. HOSPITAL COURSE:  The patient is a 21-year-old female, presented to the  emergency room with altered mental status. The patient was admitted to  the hospital.  Diagnostic evaluation revealed findings consistent with  CVA. EEG revealed no seizure. She was seen by Neurology. Echocardiogram revealed ejection fraction 55-60%. No evidence of patent  foramen ovale. The patient was discharged to home in stable condition  on 2021, with recommendations to follow up as an outpatient with  her primary care physician, Dr. Amelia Cuevas in 1 week, call office for  appointment. Follow up with Neurology, call office for appointment. DISCHARGE MEDICATIONS:  As per discharge med rec which include Tylenol  p.r.n., aspirin 81 mg daily, glipizide 5 mg b.i.d., vitamin D 50,000  units weekly, Lipitor 40 mg daily, Plavix 75 mg daily.         Romeo Ivey DO    D: 2021 13:17:17       T: 2021 13:19:32     [id number]  Job#: [id number]     Doc#: [id number]    CC:

## 2021-10-07 NOTE — PROGRESS NOTES
[address] [facility]   Phone: [phone]   Fax: [phone]    Discharge Summary      REASON FOR DISCHARGE: Pt failed to attend final appt and did not return call to reschedule. PATIENT EDUCATION/INSTRUCTIONS: continue HEP as instructed    RECOMMENDATIONS: call c questions or if additional services are warranted. Thank you for the opportunity to work with your patient. If you have questions or comments, please contact me at numbers listed above.       Roberto Waters [address] , DPT PT [id number] 10/7/2021

## 2022-04-17 ENCOUNTER — ANESTHESIA (OUTPATIENT)
Dept: OPERATING ROOM | Age: 62
DRG: 710 | End: 2022-04-17
Payer: COMMERCIAL

## 2022-04-17 ENCOUNTER — APPOINTMENT (OUTPATIENT)
Dept: GENERAL RADIOLOGY | Age: 62
DRG: 710 | End: 2022-04-17
Payer: COMMERCIAL

## 2022-04-17 ENCOUNTER — HOSPITAL ENCOUNTER (INPATIENT)
Age: 62
LOS: 3 days | Discharge: NURSING, REHAB OR LONG-TERM CARE | DRG: 710 | End: 2022-04-20
Attending: EMERGENCY MEDICINE | Admitting: INTERNAL MEDICINE
Payer: COMMERCIAL

## 2022-04-17 ENCOUNTER — PREP FOR PROCEDURE (OUTPATIENT)
Dept: PODIATRY | Age: 62
End: 2022-04-17

## 2022-04-17 ENCOUNTER — ANESTHESIA EVENT (OUTPATIENT)
Dept: OPERATING ROOM | Age: 62
DRG: 710 | End: 2022-04-17
Payer: COMMERCIAL

## 2022-04-17 DIAGNOSIS — L03.90 SEPSIS DUE TO CELLULITIS (HCC): ICD-10-CM

## 2022-04-17 DIAGNOSIS — A41.9 SEPSIS DUE TO CELLULITIS (HCC): ICD-10-CM

## 2022-04-17 DIAGNOSIS — E10.10 DIABETIC KETOACIDOSIS WITHOUT COMA ASSOCIATED WITH TYPE 1 DIABETES MELLITUS (HCC): ICD-10-CM

## 2022-04-17 DIAGNOSIS — M72.6 NECROTIZING FASCIITIS (HCC): Primary | ICD-10-CM

## 2022-04-17 PROBLEM — E11.628 CELLULITIS IN DIABETIC FOOT (HCC): Status: ACTIVE | Noted: 2022-04-17

## 2022-04-17 PROBLEM — L03.119 CELLULITIS IN DIABETIC FOOT (HCC): Status: ACTIVE | Noted: 2022-04-17

## 2022-04-17 LAB
ANION GAP SERPL CALCULATED.3IONS-SCNC: 20 MMOL/L (ref 7–16)
BACTERIA: NORMAL /HPF
BETA-HYDROXYBUTYRATE: 1.81 MMOL/L (ref 0.02–0.27)
BILIRUBIN URINE: NEGATIVE
BLOOD, URINE: ABNORMAL
BUN BLDV-MCNC: 49 MG/DL (ref 6–23)
CALCIUM SERPL-MCNC: 9.7 MG/DL (ref 8.6–10.2)
CHLORIDE BLD-SCNC: 90 MMOL/L (ref 98–107)
CLARITY: CLEAR
CO2: 18 MMOL/L (ref 22–29)
COLOR: YELLOW
CREAT SERPL-MCNC: 1.3 MG/DL (ref 0.5–1)
EPITHELIAL CELLS, UA: NORMAL /HPF
GFR AFRICAN AMERICAN: 50
GFR NON-AFRICAN AMERICAN: 41 ML/MIN/1.73
GLUCOSE BLD-MCNC: 399 MG/DL (ref 74–99)
GLUCOSE URINE: >=1000 MG/DL
KETONES, URINE: 15 MG/DL
LACTIC ACID, SEPSIS: 3.9 MMOL/L (ref 0.5–1.9)
LEUKOCYTE ESTERASE, URINE: NEGATIVE
LIPASE: 33 U/L (ref 13–60)
NITRITE, URINE: NEGATIVE
PH UA: 6 (ref 5–9)
PH VENOUS: 7.36 (ref 7.35–7.45)
POTASSIUM SERPL-SCNC: 5.4 MMOL/L (ref 3.5–5)
PRO-BNP: 4682 PG/ML (ref 0–125)
PROTEIN UA: 30 MG/DL
RBC UA: NORMAL /HPF (ref 0–2)
REASON FOR REJECTION: NORMAL
REJECTED TEST: NORMAL
SEDIMENTATION RATE, ERYTHROCYTE: 103 MM/HR (ref 0–20)
SODIUM BLD-SCNC: 128 MMOL/L (ref 132–146)
SPECIFIC GRAVITY UA: 1.01 (ref 1–1.03)
TOTAL CK: 84 U/L (ref 20–180)
TROPONIN, HIGH SENSITIVITY: 28 NG/L (ref 0–9)
UROBILINOGEN, URINE: 0.2 E.U./DL
WBC UA: NORMAL /HPF (ref 0–5)

## 2022-04-17 PROCEDURE — 73630 X-RAY EXAM OF FOOT: CPT

## 2022-04-17 PROCEDURE — 87205 SMEAR GRAM STAIN: CPT

## 2022-04-17 PROCEDURE — 99223 1ST HOSP IP/OBS HIGH 75: CPT | Performed by: INTERNAL MEDICINE

## 2022-04-17 PROCEDURE — 87206 SMEAR FLUORESCENT/ACID STAI: CPT

## 2022-04-17 PROCEDURE — 0QBN0ZZ EXCISION OF RIGHT METATARSAL, OPEN APPROACH: ICD-10-PCS | Performed by: PODIATRIST

## 2022-04-17 PROCEDURE — 84484 ASSAY OF TROPONIN QUANT: CPT

## 2022-04-17 PROCEDURE — 87040 BLOOD CULTURE FOR BACTERIA: CPT

## 2022-04-17 PROCEDURE — 80076 HEPATIC FUNCTION PANEL: CPT

## 2022-04-17 PROCEDURE — 2500000003 HC RX 250 WO HCPCS: Performed by: PODIATRIST

## 2022-04-17 PROCEDURE — 87075 CULTR BACTERIA EXCEPT BLOOD: CPT

## 2022-04-17 PROCEDURE — 3600000012 HC SURGERY LEVEL 2 ADDTL 15MIN: Performed by: PODIATRIST

## 2022-04-17 PROCEDURE — 82550 ASSAY OF CK (CPK): CPT

## 2022-04-17 PROCEDURE — 99283 EMERGENCY DEPT VISIT LOW MDM: CPT

## 2022-04-17 PROCEDURE — 87015 SPECIMEN INFECT AGNT CONCNTJ: CPT

## 2022-04-17 PROCEDURE — 87070 CULTURE OTHR SPECIMN AEROBIC: CPT

## 2022-04-17 PROCEDURE — 82800 BLOOD PH: CPT

## 2022-04-17 PROCEDURE — 6360000002 HC RX W HCPCS: Performed by: EMERGENCY MEDICINE

## 2022-04-17 PROCEDURE — 80048 BASIC METABOLIC PNL TOTAL CA: CPT

## 2022-04-17 PROCEDURE — 83880 ASSAY OF NATRIURETIC PEPTIDE: CPT

## 2022-04-17 PROCEDURE — 73590 X-RAY EXAM OF LOWER LEG: CPT

## 2022-04-17 PROCEDURE — 99253 IP/OBS CNSLTJ NEW/EST LOW 45: CPT | Performed by: PODIATRIST

## 2022-04-17 PROCEDURE — 7100000011 HC PHASE II RECOVERY - ADDTL 15 MIN: Performed by: PODIATRIST

## 2022-04-17 PROCEDURE — 87186 SC STD MICRODIL/AGAR DIL: CPT

## 2022-04-17 PROCEDURE — 85651 RBC SED RATE NONAUTOMATED: CPT

## 2022-04-17 PROCEDURE — 87102 FUNGUS ISOLATION CULTURE: CPT

## 2022-04-17 PROCEDURE — 2500000003 HC RX 250 WO HCPCS: Performed by: EMERGENCY MEDICINE

## 2022-04-17 PROCEDURE — 71045 X-RAY EXAM CHEST 1 VIEW: CPT

## 2022-04-17 PROCEDURE — 3700000001 HC ADD 15 MINUTES (ANESTHESIA): Performed by: PODIATRIST

## 2022-04-17 PROCEDURE — 85025 COMPLETE CBC W/AUTO DIFF WBC: CPT

## 2022-04-17 PROCEDURE — 2000000000 HC ICU R&B

## 2022-04-17 PROCEDURE — 82010 KETONE BODYS QUAN: CPT

## 2022-04-17 PROCEDURE — 2580000003 HC RX 258: Performed by: NURSE ANESTHETIST, CERTIFIED REGISTERED

## 2022-04-17 PROCEDURE — 7100000010 HC PHASE II RECOVERY - FIRST 15 MIN: Performed by: PODIATRIST

## 2022-04-17 PROCEDURE — 2709999900 HC NON-CHARGEABLE SUPPLY: Performed by: PODIATRIST

## 2022-04-17 PROCEDURE — 93005 ELECTROCARDIOGRAM TRACING: CPT | Performed by: EMERGENCY MEDICINE

## 2022-04-17 PROCEDURE — 81001 URINALYSIS AUTO W/SCOPE: CPT

## 2022-04-17 PROCEDURE — 87116 MYCOBACTERIA CULTURE: CPT

## 2022-04-17 PROCEDURE — 3600000002 HC SURGERY LEVEL 2 BASE: Performed by: PODIATRIST

## 2022-04-17 PROCEDURE — 83690 ASSAY OF LIPASE: CPT

## 2022-04-17 PROCEDURE — 6360000002 HC RX W HCPCS: Performed by: NURSE ANESTHETIST, CERTIFIED REGISTERED

## 2022-04-17 PROCEDURE — 3700000000 HC ANESTHESIA ATTENDED CARE: Performed by: PODIATRIST

## 2022-04-17 PROCEDURE — 87077 CULTURE AEROBIC IDENTIFY: CPT

## 2022-04-17 PROCEDURE — 86140 C-REACTIVE PROTEIN: CPT

## 2022-04-17 PROCEDURE — 83605 ASSAY OF LACTIC ACID: CPT

## 2022-04-17 PROCEDURE — 2580000003 HC RX 258: Performed by: EMERGENCY MEDICINE

## 2022-04-17 RX ORDER — SODIUM CHLORIDE 9 MG/ML
25 INJECTION, SOLUTION INTRAVENOUS PRN
Status: CANCELLED | OUTPATIENT
Start: 2022-04-17

## 2022-04-17 RX ORDER — BUPIVACAINE HYDROCHLORIDE 5 MG/ML
INJECTION, SOLUTION EPIDURAL; INTRACAUDAL PRN
Status: DISCONTINUED | OUTPATIENT
Start: 2022-04-17 | End: 2022-04-18 | Stop reason: ALTCHOICE

## 2022-04-17 RX ORDER — 0.9 % SODIUM CHLORIDE 0.9 %
1000 INTRAVENOUS SOLUTION INTRAVENOUS ONCE
Status: COMPLETED | OUTPATIENT
Start: 2022-04-17 | End: 2022-04-18

## 2022-04-17 RX ORDER — SODIUM CHLORIDE 9 MG/ML
INJECTION, SOLUTION INTRAVENOUS CONTINUOUS PRN
Status: DISCONTINUED | OUTPATIENT
Start: 2022-04-17 | End: 2022-04-18 | Stop reason: SDUPTHER

## 2022-04-17 RX ORDER — CLINDAMYCIN PHOSPHATE 900 MG/50ML
900 INJECTION INTRAVENOUS ONCE
Status: COMPLETED | OUTPATIENT
Start: 2022-04-17 | End: 2022-04-18

## 2022-04-17 RX ORDER — PROPOFOL 10 MG/ML
INJECTION, EMULSION INTRAVENOUS PRN
Status: DISCONTINUED | OUTPATIENT
Start: 2022-04-17 | End: 2022-04-18 | Stop reason: SDUPTHER

## 2022-04-17 RX ORDER — SODIUM CHLORIDE 0.9 % (FLUSH) 0.9 %
5-40 SYRINGE (ML) INJECTION PRN
Status: CANCELLED | OUTPATIENT
Start: 2022-04-17

## 2022-04-17 RX ORDER — FENTANYL CITRATE 50 UG/ML
INJECTION, SOLUTION INTRAMUSCULAR; INTRAVENOUS PRN
Status: DISCONTINUED | OUTPATIENT
Start: 2022-04-17 | End: 2022-04-18 | Stop reason: SDUPTHER

## 2022-04-17 RX ORDER — SODIUM CHLORIDE 0.9 % (FLUSH) 0.9 %
5-40 SYRINGE (ML) INJECTION EVERY 12 HOURS SCHEDULED
Status: CANCELLED | OUTPATIENT
Start: 2022-04-17

## 2022-04-17 RX ORDER — SODIUM CHLORIDE 0.9 % (FLUSH) 0.9 %
10 SYRINGE (ML) INJECTION PRN
Status: DISCONTINUED | OUTPATIENT
Start: 2022-04-17 | End: 2022-04-20 | Stop reason: HOSPADM

## 2022-04-17 RX ADMIN — PROPOFOL 20 MG: 10 INJECTION, EMULSION INTRAVENOUS at 23:44

## 2022-04-17 RX ADMIN — SODIUM CHLORIDE 1000 ML: 9 INJECTION, SOLUTION INTRAVENOUS at 23:13

## 2022-04-17 RX ADMIN — FENTANYL CITRATE 25 MCG: 50 INJECTION, SOLUTION INTRAMUSCULAR; INTRAVENOUS at 23:50

## 2022-04-17 RX ADMIN — CLINDAMYCIN IN 5 PERCENT DEXTROSE 900 MG: 18 INJECTION, SOLUTION INTRAVENOUS at 23:51

## 2022-04-17 RX ADMIN — SODIUM CHLORIDE: 9 INJECTION, SOLUTION INTRAVENOUS at 23:39

## 2022-04-17 RX ADMIN — CEFEPIME HYDROCHLORIDE 2000 MG: 2 INJECTION, POWDER, FOR SOLUTION INTRAVENOUS at 23:15

## 2022-04-17 RX ADMIN — SODIUM CHLORIDE: 9 INJECTION, SOLUTION INTRAVENOUS at 23:40

## 2022-04-17 RX ADMIN — PROPOFOL 50 MCG/KG/MIN: 10 INJECTION, EMULSION INTRAVENOUS at 23:45

## 2022-04-17 RX ADMIN — FENTANYL CITRATE 50 MCG: 50 INJECTION, SOLUTION INTRAMUSCULAR; INTRAVENOUS at 23:54

## 2022-04-17 RX ADMIN — FENTANYL CITRATE 25 MCG: 50 INJECTION, SOLUTION INTRAMUSCULAR; INTRAVENOUS at 23:45

## 2022-04-17 RX ADMIN — SODIUM CHLORIDE 1000 ML: 9 INJECTION, SOLUTION INTRAVENOUS at 23:14

## 2022-04-17 ASSESSMENT — ENCOUNTER SYMPTOMS
ABDOMINAL DISTENTION: 0
BACK PAIN: 0
SORE THROAT: 0
EYE REDNESS: 0
SHORTNESS OF BREATH: 0
VOMITING: 0
EYE DISCHARGE: 0
SINUS PRESSURE: 0
NAUSEA: 0
DIARRHEA: 0
WHEEZING: 0
EYE PAIN: 0
COUGH: 0

## 2022-04-17 ASSESSMENT — LIFESTYLE VARIABLES: SMOKING_STATUS: 1

## 2022-04-18 ENCOUNTER — APPOINTMENT (OUTPATIENT)
Dept: GENERAL RADIOLOGY | Age: 62
DRG: 710 | End: 2022-04-18
Payer: COMMERCIAL

## 2022-04-18 ENCOUNTER — APPOINTMENT (OUTPATIENT)
Dept: INTERVENTIONAL RADIOLOGY/VASCULAR | Age: 62
DRG: 710 | End: 2022-04-18
Payer: COMMERCIAL

## 2022-04-18 ENCOUNTER — APPOINTMENT (OUTPATIENT)
Dept: CT IMAGING | Age: 62
DRG: 710 | End: 2022-04-18
Payer: COMMERCIAL

## 2022-04-18 ENCOUNTER — ANESTHESIA EVENT (OUTPATIENT)
Dept: OPERATING ROOM | Age: 62
DRG: 710 | End: 2022-04-18
Payer: COMMERCIAL

## 2022-04-18 ENCOUNTER — ANESTHESIA (OUTPATIENT)
Dept: OPERATING ROOM | Age: 62
DRG: 710 | End: 2022-04-18
Payer: COMMERCIAL

## 2022-04-18 VITALS — SYSTOLIC BLOOD PRESSURE: 129 MMHG | DIASTOLIC BLOOD PRESSURE: 73 MMHG | OXYGEN SATURATION: 100 %

## 2022-04-18 VITALS
DIASTOLIC BLOOD PRESSURE: 65 MMHG | OXYGEN SATURATION: 97 % | RESPIRATION RATE: 14 BRPM | SYSTOLIC BLOOD PRESSURE: 135 MMHG

## 2022-04-18 PROBLEM — L03.90 NECROTIZING CELLULITIS: Status: ACTIVE | Noted: 2022-04-18

## 2022-04-18 LAB
ABO/RH: NORMAL
ALBUMIN SERPL-MCNC: 2.6 G/DL (ref 3.5–5.2)
ALP BLD-CCNC: 200 U/L (ref 35–104)
ALT SERPL-CCNC: 9 U/L (ref 0–32)
ANION GAP SERPL CALCULATED.3IONS-SCNC: 11 MMOL/L (ref 7–16)
ANION GAP SERPL CALCULATED.3IONS-SCNC: 12 MMOL/L (ref 7–16)
ANION GAP SERPL CALCULATED.3IONS-SCNC: 15 MMOL/L (ref 7–16)
ANION GAP SERPL CALCULATED.3IONS-SCNC: 18 MMOL/L (ref 7–16)
ANION GAP SERPL CALCULATED.3IONS-SCNC: 19 MMOL/L (ref 7–16)
ANTIBODY SCREEN: NORMAL
APTT: 27 SEC (ref 24.5–35.1)
AST SERPL-CCNC: 12 U/L (ref 0–31)
BASOPHILS ABSOLUTE: 0.17 E9/L (ref 0–0.2)
BASOPHILS ABSOLUTE: 0.17 E9/L (ref 0–0.2)
BASOPHILS RELATIVE PERCENT: 0.3 % (ref 0–2)
BASOPHILS RELATIVE PERCENT: 0.3 % (ref 0–2)
BILIRUB SERPL-MCNC: 0.9 MG/DL (ref 0–1.2)
BILIRUBIN DIRECT: 0.3 MG/DL (ref 0–0.3)
BILIRUBIN, INDIRECT: 0.6 MG/DL (ref 0–1)
BUN BLDV-MCNC: 39 MG/DL (ref 6–23)
BUN BLDV-MCNC: 43 MG/DL (ref 6–23)
BUN BLDV-MCNC: 45 MG/DL (ref 6–23)
BUN BLDV-MCNC: 46 MG/DL (ref 6–23)
BUN BLDV-MCNC: 48 MG/DL (ref 6–23)
C-REACTIVE PROTEIN: 61.2 MG/DL (ref 0–0.4)
CALCIUM SERPL-MCNC: 8.2 MG/DL (ref 8.6–10.2)
CALCIUM SERPL-MCNC: 8.4 MG/DL (ref 8.6–10.2)
CALCIUM SERPL-MCNC: 8.9 MG/DL (ref 8.6–10.2)
CALCIUM SERPL-MCNC: 8.9 MG/DL (ref 8.6–10.2)
CALCIUM SERPL-MCNC: 9 MG/DL (ref 8.6–10.2)
CHLORIDE BLD-SCNC: 101 MMOL/L (ref 98–107)
CHLORIDE BLD-SCNC: 102 MMOL/L (ref 98–107)
CHLORIDE BLD-SCNC: 103 MMOL/L (ref 98–107)
CHLORIDE BLD-SCNC: 95 MMOL/L (ref 98–107)
CHLORIDE BLD-SCNC: 99 MMOL/L (ref 98–107)
CO2: 15 MMOL/L (ref 22–29)
CO2: 16 MMOL/L (ref 22–29)
CO2: 18 MMOL/L (ref 22–29)
CO2: 18 MMOL/L (ref 22–29)
CO2: 19 MMOL/L (ref 22–29)
CREAT SERPL-MCNC: 1 MG/DL (ref 0.5–1)
CREAT SERPL-MCNC: 1.1 MG/DL (ref 0.5–1)
CREAT SERPL-MCNC: 1.2 MG/DL (ref 0.5–1)
EKG ATRIAL RATE: 114 BPM
EKG P AXIS: 67 DEGREES
EKG P-R INTERVAL: 130 MS
EKG Q-T INTERVAL: 324 MS
EKG QRS DURATION: 72 MS
EKG QTC CALCULATION (BAZETT): 446 MS
EKG R AXIS: 55 DEGREES
EKG T AXIS: 69 DEGREES
EKG VENTRICULAR RATE: 114 BPM
EOSINOPHILS ABSOLUTE: 0 E9/L (ref 0.05–0.5)
EOSINOPHILS ABSOLUTE: 0.05 E9/L (ref 0.05–0.5)
EOSINOPHILS RELATIVE PERCENT: 0 % (ref 0–6)
EOSINOPHILS RELATIVE PERCENT: 0.1 % (ref 0–6)
FIBRINOGEN: >700 MG/DL (ref 225–540)
GFR AFRICAN AMERICAN: 55
GFR AFRICAN AMERICAN: >60
GFR AFRICAN AMERICAN: >60
GFR NON-AFRICAN AMERICAN: 45 ML/MIN/1.73
GFR NON-AFRICAN AMERICAN: 50 ML/MIN/1.73
GFR NON-AFRICAN AMERICAN: 56 ML/MIN/1.73
GLUCOSE BLD-MCNC: 122 MG/DL (ref 74–99)
GLUCOSE BLD-MCNC: 148 MG/DL (ref 74–99)
GLUCOSE BLD-MCNC: 196 MG/DL (ref 74–99)
GLUCOSE BLD-MCNC: 342 MG/DL (ref 74–99)
GLUCOSE BLD-MCNC: 385 MG/DL (ref 74–99)
HBA1C MFR BLD: 7.8 % (ref 4–5.6)
HCT VFR BLD CALC: 33.5 % (ref 34–48)
HCT VFR BLD CALC: 37.5 % (ref 34–48)
HEMOGLOBIN: 11 G/DL (ref 11.5–15.5)
HEMOGLOBIN: 12.4 G/DL (ref 11.5–15.5)
IMMATURE GRANULOCYTES #: 1.49 E9/L
IMMATURE GRANULOCYTES #: 1.91 E9/L
IMMATURE GRANULOCYTES %: 2.3 % (ref 0–5)
IMMATURE GRANULOCYTES %: 3.2 % (ref 0–5)
INR BLD: 1.8
LACTIC ACID: 2.6 MMOL/L (ref 0.5–2.2)
LACTIC ACID: 2.8 MMOL/L (ref 0.5–2.2)
LACTIC ACID: 3.3 MMOL/L (ref 0.5–2.2)
LACTIC ACID: 3.3 MMOL/L (ref 0.5–2.2)
LYMPHOCYTES ABSOLUTE: 1.56 E9/L (ref 1.5–4)
LYMPHOCYTES ABSOLUTE: 1.76 E9/L (ref 1.5–4)
LYMPHOCYTES RELATIVE PERCENT: 2.6 % (ref 20–42)
LYMPHOCYTES RELATIVE PERCENT: 2.7 % (ref 20–42)
MAGNESIUM: 1.7 MG/DL (ref 1.6–2.6)
MAGNESIUM: 1.8 MG/DL (ref 1.6–2.6)
MAGNESIUM: 1.9 MG/DL (ref 1.6–2.6)
MAGNESIUM: 1.9 MG/DL (ref 1.6–2.6)
MAGNESIUM: 2 MG/DL (ref 1.6–2.6)
MCH RBC QN AUTO: 30.5 PG (ref 26–35)
MCH RBC QN AUTO: 30.5 PG (ref 26–35)
MCHC RBC AUTO-ENTMCNC: 32.8 % (ref 32–34.5)
MCHC RBC AUTO-ENTMCNC: 33.1 % (ref 32–34.5)
MCV RBC AUTO: 92.4 FL (ref 80–99.9)
MCV RBC AUTO: 92.8 FL (ref 80–99.9)
METER GLUCOSE: 126 MG/DL (ref 74–99)
METER GLUCOSE: 150 MG/DL (ref 74–99)
METER GLUCOSE: 160 MG/DL (ref 74–99)
METER GLUCOSE: 160 MG/DL (ref 74–99)
METER GLUCOSE: 165 MG/DL (ref 74–99)
METER GLUCOSE: 170 MG/DL (ref 74–99)
METER GLUCOSE: 172 MG/DL (ref 74–99)
METER GLUCOSE: 178 MG/DL (ref 74–99)
METER GLUCOSE: 201 MG/DL (ref 74–99)
METER GLUCOSE: 214 MG/DL (ref 74–99)
METER GLUCOSE: 238 MG/DL (ref 74–99)
METER GLUCOSE: 240 MG/DL (ref 74–99)
METER GLUCOSE: 398 MG/DL (ref 74–99)
METER GLUCOSE: 398 MG/DL (ref 74–99)
MONOCYTES ABSOLUTE: 1.45 E9/L (ref 0.1–0.95)
MONOCYTES ABSOLUTE: 1.97 E9/L (ref 0.1–0.95)
MONOCYTES RELATIVE PERCENT: 2.4 % (ref 2–12)
MONOCYTES RELATIVE PERCENT: 3.1 % (ref 2–12)
NEUTROPHILS ABSOLUTE: 55.07 E9/L (ref 1.8–7.3)
NEUTROPHILS ABSOLUTE: 58.78 E9/L (ref 1.8–7.3)
NEUTROPHILS RELATIVE PERCENT: 91.5 % (ref 43–80)
NEUTROPHILS RELATIVE PERCENT: 91.5 % (ref 43–80)
PDW BLD-RTO: 14.6 FL (ref 11.5–15)
PDW BLD-RTO: 14.6 FL (ref 11.5–15)
PHOSPHORUS: 1.3 MG/DL (ref 2.5–4.5)
PHOSPHORUS: 1.3 MG/DL (ref 2.5–4.5)
PHOSPHORUS: 2.1 MG/DL (ref 2.5–4.5)
PHOSPHORUS: 2.1 MG/DL (ref 2.5–4.5)
PHOSPHORUS: 2.7 MG/DL (ref 2.5–4.5)
PLATELET # BLD: 318 E9/L (ref 130–450)
PLATELET # BLD: 419 E9/L (ref 130–450)
PMV BLD AUTO: 10.6 FL (ref 7–12)
PMV BLD AUTO: 10.7 FL (ref 7–12)
POTASSIUM REFLEX MAGNESIUM: 5.6 MMOL/L (ref 3.5–5)
POTASSIUM SERPL-SCNC: 3.6 MMOL/L (ref 3.5–5)
POTASSIUM SERPL-SCNC: 3.8 MMOL/L (ref 3.5–5)
POTASSIUM SERPL-SCNC: 4 MMOL/L (ref 3.5–5)
POTASSIUM SERPL-SCNC: 4.2 MMOL/L (ref 3.5–5)
PROTHROMBIN TIME: 19.7 SEC (ref 9.3–12.4)
RBC # BLD: 3.61 E12/L (ref 3.5–5.5)
RBC # BLD: 4.06 E12/L (ref 3.5–5.5)
RBC # BLD: NORMAL 10*6/UL
RBC # BLD: NORMAL 10*6/UL
REASON FOR REJECTION: NORMAL
REJECTED TEST: NORMAL
SODIUM BLD-SCNC: 130 MMOL/L (ref 132–146)
SODIUM BLD-SCNC: 132 MMOL/L (ref 132–146)
SODIUM BLD-SCNC: 132 MMOL/L (ref 132–146)
SODIUM BLD-SCNC: 133 MMOL/L (ref 132–146)
SODIUM BLD-SCNC: 134 MMOL/L (ref 132–146)
TOTAL PROTEIN: 9.2 G/DL (ref 6.4–8.3)
WBC # BLD: 60.2 E9/L (ref 4.5–11.5)
WBC # BLD: 64.2 E9/L (ref 4.5–11.5)

## 2022-04-18 PROCEDURE — C9113 INJ PANTOPRAZOLE SODIUM, VIA: HCPCS | Performed by: PHYSICIAN ASSISTANT

## 2022-04-18 PROCEDURE — 93010 ELECTROCARDIOGRAM REPORT: CPT | Performed by: INTERNAL MEDICINE

## 2022-04-18 PROCEDURE — 73700 CT LOWER EXTREMITY W/O DYE: CPT

## 2022-04-18 PROCEDURE — 86850 RBC ANTIBODY SCREEN: CPT

## 2022-04-18 PROCEDURE — 86923 COMPATIBILITY TEST ELECTRIC: CPT

## 2022-04-18 PROCEDURE — 3600000013 HC SURGERY LEVEL 3 ADDTL 15MIN: Performed by: SURGERY

## 2022-04-18 PROCEDURE — 2580000003 HC RX 258: Performed by: NURSE ANESTHETIST, CERTIFIED REGISTERED

## 2022-04-18 PROCEDURE — 28002 TREATMENT OF FOOT INFECTION: CPT | Performed by: PODIATRIST

## 2022-04-18 PROCEDURE — 88311 DECALCIFY TISSUE: CPT

## 2022-04-18 PROCEDURE — 84100 ASSAY OF PHOSPHORUS: CPT

## 2022-04-18 PROCEDURE — 80048 BASIC METABOLIC PNL TOTAL CA: CPT

## 2022-04-18 PROCEDURE — 0Y6C0Z3 DETACHMENT AT RIGHT UPPER LEG, LOW, OPEN APPROACH: ICD-10-PCS | Performed by: SURGERY

## 2022-04-18 PROCEDURE — 99222 1ST HOSP IP/OBS MODERATE 55: CPT | Performed by: INTERNAL MEDICINE

## 2022-04-18 PROCEDURE — 99254 IP/OBS CNSLTJ NEW/EST MOD 60: CPT | Performed by: SURGERY

## 2022-04-18 PROCEDURE — 86901 BLOOD TYPING SEROLOGIC RH(D): CPT

## 2022-04-18 PROCEDURE — 71045 X-RAY EXAM CHEST 1 VIEW: CPT

## 2022-04-18 PROCEDURE — 27592 AMPUTATE LEG AT THIGH: CPT | Performed by: SURGERY

## 2022-04-18 PROCEDURE — 2580000003 HC RX 258: Performed by: PHYSICIAN ASSISTANT

## 2022-04-18 PROCEDURE — 6370000000 HC RX 637 (ALT 250 FOR IP): Performed by: SURGERY

## 2022-04-18 PROCEDURE — 83036 HEMOGLOBIN GLYCOSYLATED A1C: CPT

## 2022-04-18 PROCEDURE — 36556 INSERT NON-TUNNEL CV CATH: CPT

## 2022-04-18 PROCEDURE — 3600000003 HC SURGERY LEVEL 3 BASE: Performed by: SURGERY

## 2022-04-18 PROCEDURE — 3700000001 HC ADD 15 MINUTES (ANESTHESIA): Performed by: SURGERY

## 2022-04-18 PROCEDURE — 27592 AMPUTATE LEG AT THIGH: CPT | Performed by: PHYSICIAN ASSISTANT

## 2022-04-18 PROCEDURE — 36415 COLL VENOUS BLD VENIPUNCTURE: CPT

## 2022-04-18 PROCEDURE — 3700000000 HC ANESTHESIA ATTENDED CARE: Performed by: SURGERY

## 2022-04-18 PROCEDURE — 2500000003 HC RX 250 WO HCPCS: Performed by: PHYSICIAN ASSISTANT

## 2022-04-18 PROCEDURE — 2000000000 HC ICU R&B

## 2022-04-18 PROCEDURE — 87081 CULTURE SCREEN ONLY: CPT

## 2022-04-18 PROCEDURE — 82962 GLUCOSE BLOOD TEST: CPT

## 2022-04-18 PROCEDURE — 85025 COMPLETE CBC W/AUTO DIFF WBC: CPT

## 2022-04-18 PROCEDURE — 2709999900 HC NON-CHARGEABLE SUPPLY: Performed by: SURGERY

## 2022-04-18 PROCEDURE — 6360000002 HC RX W HCPCS: Performed by: PHYSICIAN ASSISTANT

## 2022-04-18 PROCEDURE — 99233 SBSQ HOSP IP/OBS HIGH 50: CPT | Performed by: INTERNAL MEDICINE

## 2022-04-18 PROCEDURE — 11044 DBRDMT BONE 1ST 20 SQ CM/<: CPT | Performed by: PODIATRIST

## 2022-04-18 PROCEDURE — 83735 ASSAY OF MAGNESIUM: CPT

## 2022-04-18 PROCEDURE — 86900 BLOOD TYPING SEROLOGIC ABO: CPT

## 2022-04-18 PROCEDURE — 02HV33Z INSERTION OF INFUSION DEVICE INTO SUPERIOR VENA CAVA, PERCUTANEOUS APPROACH: ICD-10-PCS | Performed by: INTERNAL MEDICINE

## 2022-04-18 PROCEDURE — 6370000000 HC RX 637 (ALT 250 FOR IP): Performed by: INTERNAL MEDICINE

## 2022-04-18 PROCEDURE — 6370000000 HC RX 637 (ALT 250 FOR IP): Performed by: PODIATRIST

## 2022-04-18 PROCEDURE — 85610 PROTHROMBIN TIME: CPT

## 2022-04-18 PROCEDURE — 2580000003 HC RX 258: Performed by: INTERNAL MEDICINE

## 2022-04-18 PROCEDURE — 2580000003 HC RX 258: Performed by: PODIATRIST

## 2022-04-18 PROCEDURE — 88307 TISSUE EXAM BY PATHOLOGIST: CPT

## 2022-04-18 PROCEDURE — 83605 ASSAY OF LACTIC ACID: CPT

## 2022-04-18 PROCEDURE — P9016 RBC LEUKOCYTES REDUCED: HCPCS

## 2022-04-18 PROCEDURE — 93923 UPR/LXTR ART STDY 3+ LVLS: CPT

## 2022-04-18 PROCEDURE — 6360000002 HC RX W HCPCS: Performed by: PODIATRIST

## 2022-04-18 PROCEDURE — 6360000002 HC RX W HCPCS: Performed by: NURSE ANESTHETIST, CERTIFIED REGISTERED

## 2022-04-18 PROCEDURE — 6360000002 HC RX W HCPCS: Performed by: INTERNAL MEDICINE

## 2022-04-18 PROCEDURE — 2500000003 HC RX 250 WO HCPCS: Performed by: NURSE ANESTHETIST, CERTIFIED REGISTERED

## 2022-04-18 PROCEDURE — 85384 FIBRINOGEN ACTIVITY: CPT

## 2022-04-18 PROCEDURE — 85730 THROMBOPLASTIN TIME PARTIAL: CPT

## 2022-04-18 RX ORDER — MAGNESIUM SULFATE 1 G/100ML
1000 INJECTION INTRAVENOUS PRN
Status: DISCONTINUED | OUTPATIENT
Start: 2022-04-18 | End: 2022-04-20 | Stop reason: HOSPADM

## 2022-04-18 RX ORDER — POLYETHYLENE GLYCOL 3350 17 G/17G
17 POWDER, FOR SOLUTION ORAL DAILY PRN
Status: DISCONTINUED | OUTPATIENT
Start: 2022-04-18 | End: 2022-04-20 | Stop reason: HOSPADM

## 2022-04-18 RX ORDER — PERMETHRIN 50 MG/G
CREAM TOPICAL ONCE
Status: DISCONTINUED | OUTPATIENT
Start: 2022-04-18 | End: 2022-04-18

## 2022-04-18 RX ORDER — SODIUM CHLORIDE 9 MG/ML
INJECTION, SOLUTION INTRAVENOUS CONTINUOUS PRN
Status: DISCONTINUED | OUTPATIENT
Start: 2022-04-18 | End: 2022-04-18 | Stop reason: SDUPTHER

## 2022-04-18 RX ORDER — ERGOCALCIFEROL 1.25 MG/1
50000 CAPSULE ORAL WEEKLY
Status: DISCONTINUED | OUTPATIENT
Start: 2022-04-18 | End: 2022-04-20 | Stop reason: HOSPADM

## 2022-04-18 RX ORDER — SODIUM CHLORIDE 9 MG/ML
INJECTION, SOLUTION INTRAVENOUS PRN
Status: DISCONTINUED | OUTPATIENT
Start: 2022-04-18 | End: 2022-04-20 | Stop reason: HOSPADM

## 2022-04-18 RX ORDER — SODIUM CHLORIDE 9 MG/ML
25 INJECTION, SOLUTION INTRAVENOUS PRN
Status: DISCONTINUED | OUTPATIENT
Start: 2022-04-18 | End: 2022-04-18 | Stop reason: HOSPADM

## 2022-04-18 RX ORDER — LINDANE 10 MG/ML
SHAMPOO, SUSPENSION TOPICAL ONCE
Status: COMPLETED | OUTPATIENT
Start: 2022-04-18 | End: 2022-04-18

## 2022-04-18 RX ORDER — DEXTROSE MONOHYDRATE 25 G/50ML
12.5 INJECTION, SOLUTION INTRAVENOUS PRN
Status: DISCONTINUED | OUTPATIENT
Start: 2022-04-18 | End: 2022-04-18

## 2022-04-18 RX ORDER — MAGNESIUM SULFATE 1 G/100ML
1000 INJECTION INTRAVENOUS PRN
Status: DISCONTINUED | OUTPATIENT
Start: 2022-04-18 | End: 2022-04-19

## 2022-04-18 RX ORDER — 0.9 % SODIUM CHLORIDE 0.9 %
15 INTRAVENOUS SOLUTION INTRAVENOUS ONCE
Status: DISCONTINUED | OUTPATIENT
Start: 2022-04-18 | End: 2022-04-18

## 2022-04-18 RX ORDER — ONDANSETRON 2 MG/ML
INJECTION INTRAMUSCULAR; INTRAVENOUS PRN
Status: DISCONTINUED | OUTPATIENT
Start: 2022-04-18 | End: 2022-04-18 | Stop reason: SDUPTHER

## 2022-04-18 RX ORDER — DEXTROSE MONOHYDRATE 50 MG/ML
100 INJECTION, SOLUTION INTRAVENOUS PRN
Status: DISCONTINUED | OUTPATIENT
Start: 2022-04-18 | End: 2022-04-20 | Stop reason: HOSPADM

## 2022-04-18 RX ORDER — DEXAMETHASONE SODIUM PHOSPHATE 4 MG/ML
INJECTION, SOLUTION INTRA-ARTICULAR; INTRALESIONAL; INTRAMUSCULAR; INTRAVENOUS; SOFT TISSUE PRN
Status: DISCONTINUED | OUTPATIENT
Start: 2022-04-18 | End: 2022-04-18 | Stop reason: SDUPTHER

## 2022-04-18 RX ORDER — SODIUM CHLORIDE 0.9 % (FLUSH) 0.9 %
10 SYRINGE (ML) INJECTION EVERY 12 HOURS SCHEDULED
Status: DISCONTINUED | OUTPATIENT
Start: 2022-04-18 | End: 2022-04-20 | Stop reason: HOSPADM

## 2022-04-18 RX ORDER — SODIUM CHLORIDE 9 MG/ML
INJECTION, SOLUTION INTRAVENOUS CONTINUOUS
Status: DISCONTINUED | OUTPATIENT
Start: 2022-04-18 | End: 2022-04-18

## 2022-04-18 RX ORDER — NICOTINE POLACRILEX 4 MG
15 LOZENGE BUCCAL PRN
Status: DISCONTINUED | OUTPATIENT
Start: 2022-04-18 | End: 2022-04-20 | Stop reason: HOSPADM

## 2022-04-18 RX ORDER — POTASSIUM CHLORIDE 7.45 MG/ML
10 INJECTION INTRAVENOUS PRN
Status: DISCONTINUED | OUTPATIENT
Start: 2022-04-18 | End: 2022-04-18

## 2022-04-18 RX ORDER — SODIUM CHLORIDE 0.9 % (FLUSH) 0.9 %
5-40 SYRINGE (ML) INJECTION EVERY 12 HOURS SCHEDULED
Status: DISCONTINUED | OUTPATIENT
Start: 2022-04-18 | End: 2022-04-18 | Stop reason: HOSPADM

## 2022-04-18 RX ORDER — PROPOFOL 10 MG/ML
INJECTION, EMULSION INTRAVENOUS PRN
Status: DISCONTINUED | OUTPATIENT
Start: 2022-04-18 | End: 2022-04-18 | Stop reason: SDUPTHER

## 2022-04-18 RX ORDER — ASPIRIN 81 MG/1
81 TABLET ORAL DAILY
Status: DISCONTINUED | OUTPATIENT
Start: 2022-04-18 | End: 2022-04-20 | Stop reason: HOSPADM

## 2022-04-18 RX ORDER — SODIUM HYPOCHLORITE 1.25 MG/ML
SOLUTION TOPICAL ONCE
Status: COMPLETED | OUTPATIENT
Start: 2022-04-18 | End: 2022-04-18

## 2022-04-18 RX ORDER — SODIUM CHLORIDE 450 MG/100ML
INJECTION, SOLUTION INTRAVENOUS CONTINUOUS
Status: DISCONTINUED | OUTPATIENT
Start: 2022-04-18 | End: 2022-04-18

## 2022-04-18 RX ORDER — SODIUM CHLORIDE 0.9 % (FLUSH) 0.9 %
10 SYRINGE (ML) INJECTION PRN
Status: DISCONTINUED | OUTPATIENT
Start: 2022-04-18 | End: 2022-04-20 | Stop reason: HOSPADM

## 2022-04-18 RX ORDER — POTASSIUM CHLORIDE 7.45 MG/ML
10 INJECTION INTRAVENOUS PRN
Status: DISCONTINUED | OUTPATIENT
Start: 2022-04-18 | End: 2022-04-19

## 2022-04-18 RX ORDER — ACETAMINOPHEN 650 MG
TABLET, EXTENDED RELEASE ORAL
Status: COMPLETED
Start: 2022-04-18 | End: 2022-04-18

## 2022-04-18 RX ORDER — ACETAMINOPHEN 650 MG/1
650 SUPPOSITORY RECTAL EVERY 6 HOURS PRN
Status: DISCONTINUED | OUTPATIENT
Start: 2022-04-18 | End: 2022-04-20 | Stop reason: HOSPADM

## 2022-04-18 RX ORDER — DEXTROSE AND SODIUM CHLORIDE 5; .45 G/100ML; G/100ML
INJECTION, SOLUTION INTRAVENOUS CONTINUOUS PRN
Status: DISCONTINUED | OUTPATIENT
Start: 2022-04-18 | End: 2022-04-18

## 2022-04-18 RX ORDER — ATORVASTATIN CALCIUM 40 MG/1
40 TABLET, FILM COATED ORAL NIGHTLY
Status: DISCONTINUED | OUTPATIENT
Start: 2022-04-18 | End: 2022-04-20 | Stop reason: HOSPADM

## 2022-04-18 RX ORDER — SODIUM HYPOCHLORITE 1.25 MG/ML
SOLUTION TOPICAL DAILY
Status: DISCONTINUED | OUTPATIENT
Start: 2022-04-18 | End: 2022-04-20 | Stop reason: HOSPADM

## 2022-04-18 RX ORDER — SUCCINYLCHOLINE/SOD CL,ISO/PF 200MG/10ML
SYRINGE (ML) INTRAVENOUS PRN
Status: DISCONTINUED | OUTPATIENT
Start: 2022-04-18 | End: 2022-04-18 | Stop reason: SDUPTHER

## 2022-04-18 RX ORDER — GLIPIZIDE 5 MG/1
5 TABLET ORAL
Status: DISCONTINUED | OUTPATIENT
Start: 2022-04-18 | End: 2022-04-18

## 2022-04-18 RX ORDER — SODIUM CHLORIDE 9 MG/ML
25 INJECTION, SOLUTION INTRAVENOUS EVERY 8 HOURS
Status: DISCONTINUED | OUTPATIENT
Start: 2022-04-18 | End: 2022-04-20 | Stop reason: HOSPADM

## 2022-04-18 RX ORDER — PANTOPRAZOLE SODIUM 40 MG/10ML
40 INJECTION, POWDER, LYOPHILIZED, FOR SOLUTION INTRAVENOUS DAILY
Status: DISCONTINUED | OUTPATIENT
Start: 2022-04-18 | End: 2022-04-20

## 2022-04-18 RX ORDER — DEXTROSE AND SODIUM CHLORIDE 5; .45 G/100ML; G/100ML
INJECTION, SOLUTION INTRAVENOUS CONTINUOUS PRN
Status: DISCONTINUED | OUTPATIENT
Start: 2022-04-18 | End: 2022-04-19

## 2022-04-18 RX ORDER — PHENYLEPHRINE HCL IN 0.9% NACL 1 MG/10 ML
SYRINGE (ML) INTRAVENOUS PRN
Status: DISCONTINUED | OUTPATIENT
Start: 2022-04-18 | End: 2022-04-18 | Stop reason: SDUPTHER

## 2022-04-18 RX ORDER — SODIUM CHLORIDE 9 MG/ML
INJECTION, SOLUTION INTRAVENOUS CONTINUOUS
Status: DISCONTINUED | OUTPATIENT
Start: 2022-04-18 | End: 2022-04-19

## 2022-04-18 RX ORDER — FENTANYL CITRATE 50 UG/ML
INJECTION, SOLUTION INTRAMUSCULAR; INTRAVENOUS PRN
Status: DISCONTINUED | OUTPATIENT
Start: 2022-04-18 | End: 2022-04-18 | Stop reason: SDUPTHER

## 2022-04-18 RX ORDER — INSULIN GLARGINE 100 [IU]/ML
15 INJECTION, SOLUTION SUBCUTANEOUS NIGHTLY
Status: DISCONTINUED | OUTPATIENT
Start: 2022-04-18 | End: 2022-04-18

## 2022-04-18 RX ORDER — ACETAMINOPHEN 325 MG/1
650 TABLET ORAL EVERY 6 HOURS PRN
Status: DISCONTINUED | OUTPATIENT
Start: 2022-04-18 | End: 2022-04-20 | Stop reason: HOSPADM

## 2022-04-18 RX ORDER — CLOPIDOGREL BISULFATE 75 MG/1
75 TABLET ORAL DAILY
Status: DISCONTINUED | OUTPATIENT
Start: 2022-04-18 | End: 2022-04-20 | Stop reason: HOSPADM

## 2022-04-18 RX ORDER — SODIUM CHLORIDE 0.9 % (FLUSH) 0.9 %
5-40 SYRINGE (ML) INJECTION PRN
Status: DISCONTINUED | OUTPATIENT
Start: 2022-04-18 | End: 2022-04-18 | Stop reason: HOSPADM

## 2022-04-18 RX ADMIN — SODIUM CHLORIDE: 9 INJECTION, SOLUTION INTRAVENOUS at 15:35

## 2022-04-18 RX ADMIN — PANTOPRAZOLE SODIUM 40 MG: 40 INJECTION, POWDER, FOR SOLUTION INTRAVENOUS at 19:51

## 2022-04-18 RX ADMIN — Medication: at 19:58

## 2022-04-18 RX ADMIN — ONDANSETRON 4 MG: 2 INJECTION INTRAMUSCULAR; INTRAVENOUS at 15:52

## 2022-04-18 RX ADMIN — CLOPIDOGREL BISULFATE 75 MG: 75 TABLET ORAL at 10:15

## 2022-04-18 RX ADMIN — VANCOMYCIN HYDROCHLORIDE 1500 MG: 10 INJECTION, POWDER, LYOPHILIZED, FOR SOLUTION INTRAVENOUS at 02:20

## 2022-04-18 RX ADMIN — LINDANE: 10 SHAMPOO, SUSPENSION TOPICAL at 02:46

## 2022-04-18 RX ADMIN — DEXTROSE AND SODIUM CHLORIDE: 5; 450 INJECTION, SOLUTION INTRAVENOUS at 21:54

## 2022-04-18 RX ADMIN — POTASSIUM CHLORIDE 10 MEQ: 7.46 INJECTION, SOLUTION INTRAVENOUS at 19:45

## 2022-04-18 RX ADMIN — SODIUM CHLORIDE: 9 INJECTION, SOLUTION INTRAVENOUS at 10:01

## 2022-04-18 RX ADMIN — FENTANYL CITRATE 100 MCG: 50 INJECTION, SOLUTION INTRAMUSCULAR; INTRAVENOUS at 15:39

## 2022-04-18 RX ADMIN — SODIUM CHLORIDE, PRESERVATIVE FREE 10 ML: 5 INJECTION INTRAVENOUS at 10:17

## 2022-04-18 RX ADMIN — PROPOFOL 100 MG: 10 INJECTION, EMULSION INTRAVENOUS at 15:39

## 2022-04-18 RX ADMIN — VANCOMYCIN HYDROCHLORIDE 1500 MG: 10 INJECTION, POWDER, LYOPHILIZED, FOR SOLUTION INTRAVENOUS at 11:46

## 2022-04-18 RX ADMIN — PIPERACILLIN AND TAZOBACTAM 3375 MG: 3; .375 INJECTION, POWDER, LYOPHILIZED, FOR SOLUTION INTRAVENOUS at 11:50

## 2022-04-18 RX ADMIN — SODIUM CHLORIDE 12.5 ML: 9 INJECTION, SOLUTION INTRAVENOUS at 15:09

## 2022-04-18 RX ADMIN — INSULIN LISPRO 10 UNITS: 100 INJECTION, SOLUTION INTRAVENOUS; SUBCUTANEOUS at 10:23

## 2022-04-18 RX ADMIN — PIPERACILLIN AND TAZOBACTAM 3375 MG: 3; .375 INJECTION, POWDER, LYOPHILIZED, FOR SOLUTION INTRAVENOUS at 19:58

## 2022-04-18 RX ADMIN — SODIUM CHLORIDE 25 ML: 9 INJECTION, SOLUTION INTRAVENOUS at 10:03

## 2022-04-18 RX ADMIN — SODIUM CHLORIDE 0.1 UNITS/KG/HR: 9 INJECTION, SOLUTION INTRAVENOUS at 11:51

## 2022-04-18 RX ADMIN — POTASSIUM CHLORIDE 10 MEQ: 7.46 INJECTION, SOLUTION INTRAVENOUS at 21:36

## 2022-04-18 RX ADMIN — INSULIN LISPRO 6 UNITS: 100 INJECTION, SOLUTION INTRAVENOUS; SUBCUTANEOUS at 10:25

## 2022-04-18 RX ADMIN — POTASSIUM CHLORIDE 10 MEQ: 7.46 INJECTION, SOLUTION INTRAVENOUS at 23:47

## 2022-04-18 RX ADMIN — SODIUM CHLORIDE: 9 INJECTION, SOLUTION INTRAVENOUS at 02:18

## 2022-04-18 RX ADMIN — Medication: at 11:37

## 2022-04-18 RX ADMIN — DEXAMETHASONE SODIUM PHOSPHATE 10 MG: 4 INJECTION INTRA-ARTICULAR; INTRALESIONAL; INTRAMUSCULAR; INTRAVENOUS; SOFT TISSUE at 15:52

## 2022-04-18 RX ADMIN — POTASSIUM CHLORIDE 10 MEQ: 7.46 INJECTION, SOLUTION INTRAVENOUS at 20:35

## 2022-04-18 RX ADMIN — Medication 160 MG: at 15:39

## 2022-04-18 RX ADMIN — ERGOCALCIFEROL 50000 UNITS: 1.25 CAPSULE ORAL at 10:16

## 2022-04-18 RX ADMIN — Medication 200 MCG: at 15:58

## 2022-04-18 RX ADMIN — SODIUM PHOSPHATE, MONOBASIC, MONOHYDRATE 20 MMOL: 276; 142 INJECTION, SOLUTION INTRAVENOUS at 19:53

## 2022-04-18 RX ADMIN — PIPERACILLIN AND TAZOBACTAM 3375 MG: 3; .375 INJECTION, POWDER, LYOPHILIZED, FOR SOLUTION INTRAVENOUS at 03:07

## 2022-04-18 RX ADMIN — DEXTROSE AND SODIUM CHLORIDE: 5; 450 INJECTION, SOLUTION INTRAVENOUS at 15:19

## 2022-04-18 RX ADMIN — ASPIRIN 81 MG: 81 TABLET, COATED ORAL at 10:15

## 2022-04-18 ASSESSMENT — PAIN SCALES - GENERAL
PAINLEVEL_OUTOF10: 2
PAINLEVEL_OUTOF10: 0

## 2022-04-18 ASSESSMENT — PAIN DESCRIPTION - ORIENTATION
ORIENTATION: RIGHT

## 2022-04-18 ASSESSMENT — PULMONARY FUNCTION TESTS
PIF_VALUE: 20
PIF_VALUE: 3
PIF_VALUE: 11
PIF_VALUE: 11
PIF_VALUE: 18
PIF_VALUE: 19
PIF_VALUE: 20
PIF_VALUE: 19
PIF_VALUE: 10
PIF_VALUE: 17
PIF_VALUE: 2
PIF_VALUE: 0
PIF_VALUE: 11
PIF_VALUE: 1
PIF_VALUE: 19
PIF_VALUE: 11
PIF_VALUE: 19
PIF_VALUE: 3
PIF_VALUE: 0
PIF_VALUE: 2
PIF_VALUE: 0
PIF_VALUE: 0
PIF_VALUE: 10
PIF_VALUE: 0
PIF_VALUE: 20
PIF_VALUE: 19
PIF_VALUE: 11
PIF_VALUE: 11
PIF_VALUE: 20
PIF_VALUE: 3
PIF_VALUE: 0
PIF_VALUE: 1
PIF_VALUE: 19
PIF_VALUE: 2
PIF_VALUE: 19
PIF_VALUE: 20
PIF_VALUE: 11
PIF_VALUE: 19
PIF_VALUE: 4
PIF_VALUE: 19
PIF_VALUE: 17
PIF_VALUE: 3
PIF_VALUE: 0
PIF_VALUE: 2
PIF_VALUE: 18
PIF_VALUE: 18
PIF_VALUE: 11
PIF_VALUE: 20
PIF_VALUE: 10
PIF_VALUE: 19

## 2022-04-18 ASSESSMENT — PAIN DESCRIPTION - ONSET
ONSET: ON-GOING
ONSET: GRADUAL

## 2022-04-18 ASSESSMENT — PAIN DESCRIPTION - PAIN TYPE
TYPE: SURGICAL PAIN

## 2022-04-18 ASSESSMENT — LIFESTYLE VARIABLES: SMOKING_STATUS: 1

## 2022-04-18 ASSESSMENT — PAIN DESCRIPTION - LOCATION
LOCATION: FOOT

## 2022-04-18 ASSESSMENT — PAIN DESCRIPTION - FREQUENCY
FREQUENCY: CONTINUOUS
FREQUENCY: CONTINUOUS

## 2022-04-18 ASSESSMENT — PAIN DESCRIPTION - DESCRIPTORS
DESCRIPTORS: DISCOMFORT
DESCRIPTORS: DISCOMFORT

## 2022-04-18 ASSESSMENT — PAIN DESCRIPTION - PROGRESSION
CLINICAL_PROGRESSION: NOT CHANGED
CLINICAL_PROGRESSION: NOT CHANGED

## 2022-04-18 NOTE — CONSULTS
ENDOCRINOLOGY INITIAL CONSULTATION NOTE      Date of admission: 4/17/2022  Date of service: 4/18/2022  Admitting physician: Justina Caputo DO   Primary Care Physician: Carmel Humphrey DO  Consultant physician: Anthony Alvarado MD     Reason for the consultation:  Uncontrolled DM    History of Present Illness: The history is provided from medical records. Pt very sick and wasn't able to provide the history     Monica Titus is a very pleasant 57 y.o. old female with PMH DM type 2, HLD and other listed below admitted to [facility] on 4/17/2022 because of Rt foot swelling and pain and found to have necrotizing fasciitis, endocrine service was consulted for diabetes management. Admission labs showed , BHB 1.8 (H), WBC 64.2, lactate 3.9   Pt underwent Rt AKA 4/18/2022 and currently in ICU on insulin drip     Prior to admission  The patient wasn't able to tell much about her history, she just came back from OR   Lab Results   Component Value Date    LABA1C 7.8 04/18/2022       Inpatient diet:   Carb Restricted diet     Point of care glucose monitoring   (Independently reviewed)   Recent Labs     04/18/22  1306 04/18/22  1349 04/18/22  1508 04/18/22  1648 04/18/22  1730 04/18/22  1731 04/18/22  1834 04/18/22  1836   GLUMET 238* 160* 126* 160* 240* 214* 398* 201*       Past medical history:   Past Medical History:   Diagnosis Date    Bone marrow edema 03/25/2021    Abnl. C-spine MRI wo/mikel.  Patient refused MR C-spien with contrast, c/o out-of-pocket cost of other MRI's    DDD (degenerative disc disease), cervical 03/25/2021    Diabetes mellitus ([facility]     Hyperlipidemia     Lumbar degenerative disc disease 03/15/2021    Multiple lacunar infarcts (HCC) 03/25/2021    Numbness and tingling 03/15/2021    Postural dizziness 03/15/2021    UTI (urinary tract infection)        Past surgical history:  Past Surgical History:   Procedure Laterality Date    FOOT DEBRIDEMENT N/A 4/17/2022    right foot ulcer deep wound cultures  performed by Cornelia Rodriguez DPM at [address] [facility] Left 09/27/2016       Social history:   Tobacco:   reports that she has been smoking cigarettes. She has a 30.00 pack-year smoking history. She has never used smokeless tobacco.  Alcohol:   reports no history of alcohol use. Drugs:   reports no history of drug use. Family history:    No family history on file. Allergy and drug reactions:    Allergies   Allergen Reactions    Codeine     Pyridium [Phenazopyridine Hcl]        Scheduled Meds:   atorvastatin  40 mg Oral Nightly    aspirin  81 mg Oral Daily    sodium chloride flush  10 mL IntraVENous 2 times per day    vitamin D  50,000 Units Oral Weekly    clopidogrel  75 mg Oral Daily    piperacillin-tazobactam  3,375 mg IntraVENous Q8H    vancomycin  20 mg/kg IntraVENous Q24H    pantoprazole  40 mg IntraVENous Daily    sodium hypochlorite   Irrigation Daily       PRN Meds:   glucose, 15 g, PRN  glucagon (rDNA), 1 mg, PRN  dextrose, 100 mL/hr, PRN  sodium chloride flush, 10 mL, PRN  sodium chloride, , PRN  polyethylene glycol, 17 g, Daily PRN  acetaminophen, 650 mg, Q6H PRN   Or  acetaminophen, 650 mg, Q6H PRN  magnesium sulfate, 1,000 mg, PRN  potassium chloride, 10 mEq, PRN  magnesium sulfate, 1,000 mg, PRN  sodium phosphate IVPB, 10 mmol, PRN   Or  sodium phosphate IVPB, 15 mmol, PRN   Or  sodium phosphate IVPB, 20 mmol, PRN  dextrose 5 % and 0.45 % NaCl, , Continuous PRN  dextrose bolus (hypoglycemia), 125 mL, PRN   Or  dextrose bolus (hypoglycemia), 250 mL, PRN  HYDROmorphone, 0.25 mg, Q3H PRN   Or  HYDROmorphone, 0.5 mg, Q3H PRN  sodium chloride flush, 10 mL, PRN      Continuous Infusions:   dextrose      sodium chloride Stopped (04/18/22 1516)    sodium chloride      sodium chloride 12.5 mL/hr at 04/18/22 1800    dextrose 5 % and 0.45 % NaCl 150 mL/hr at 04/18/22 1800    insulin 7.05 Units/kg/hr (04/18/22 1830)       Review of Systems  Didn't answer any of Qs OBJECTIVE    /69   Pulse 99   Temp 100.5 °F (38.1 °C) (Oral)   Resp 27   Ht 5' 3\" (1.6 m)   Wt 173 lb (78.5 kg)   SpO2 95%   BMI 30.65 kg/m²     Intake/Output Summary (Last 24 hours) at 4/18/2022 1852  Last data filed at 4/18/2022 1800  Gross per 24 hour   Intake 4259.43 ml   Output 1275 ml   Net 2984.43 ml       Physical examination:  General: sleepy   HEENT: normocephalic non traumatic, no exophthalmos   Neck: supple,    Pulm: good equal air entry no added sounds  CVS: S1 + S2  Abd: soft lax, no tenderness  Skin: s/p Rt side AKA     Review of Laboratory Data:  I personally reviewed the following labs:   Recent Labs     04/17/22 2209 04/18/22  0250   WBC 64.2* 60.2*   RBC 4.06 3.61   HGB 12.4 11.0*   HCT 37.5 33.5*   MCV 92.4 92.8   MCH 30.5 30.5   MCHC 33.1 32.8   RDW 14.6 14.6    318   MPV 10.7 10.6     Recent Labs     04/17/22  2302 04/17/22  2302 04/18/22  0520 04/18/22  1111 04/18/22  1509   *   < > 130* 132 134   K 5.4*  --  5.6* 4.0 3.8   CL 90*   < > 95* 99 101   CO2 18*   < > 16* 15* 18*   BUN 49*   < > 46* 48* 45*   CREATININE 1.3*   < > 1.0 1.2* 1.2*   GLUCOSE 399*   < > 385* 342* 122*   CALCIUM 9.7   < > 8.9 8.9 9.0   PROT 9.2*  --   --   --   --    LABALBU 2.6*  --   --   --   --    BILITOT 0.9  --   --   --   --    ALKPHOS 200*  --   --   --   --    AST 12  --   --   --   --    ALT 9  --   --   --   --     < > = values in this interval not displayed.      Beta-Hydroxybutyrate   Date Value Ref Range Status   04/17/2022 1.81 (H) 0.02 - 0.27 mmol/L Final     Lab Results   Component Value Date    LABA1C 7.8 04/18/2022    LABA1C 6.6 07/26/2021    LABA1C 6.3 02/24/2021     Lab Results   Component Value Date/Time    TSH 2.050 02/24/2021 10:19 AM    Y3GUQQW 7.2 02/24/2021 10:19 AM     Lab Results   Component Value Date    LABA1C 7.8 04/18/2022    GLUCOSE 122 04/18/2022    LABMICR 115.4 11/25/2020     Lab Results   Component Value Date    TRIG 176 07/26/2021    HDL 37 07/26/2021 [address] 136 07/26/2021    CHOL 208 07/26/2021       Blood culture   No results found for: [facility]    Radiology:  VL LOWER EXTREMITY ARTERIAL SEGMENTAL PRESSURES W PPG   Final Result   1. Abnormal ankle brachial indices bilaterally at 0.5 on the right and 0.6   on the left. Dampened but preserved biphasic waveforms at all levels   bilaterally. Changes probably secondary to 2 advanced underlying peripheral   vascular disease. 2. PVR evaluation of the bilateral lower extremities from the calf to the   ankles demonstrates irregular but preserved pulsatility with relatively   preserved amplitudes. Non pulsatility in the right metatarsal.  Digital   waveform evaluation of the digits of the bilateral feet were markedly   abnormal.  On the right there is non pulsatility in all digits. On the left   there is non pulsatility in digits 3 through 5 with preserved pulsatility but   severely decreased amplitudes in digits 1 and 2. These findings are most   likely secondary to advanced underlying microvascular disease. At this time   there is poor collateral flow to the digits of the bilateral feet right worse   than left. CT TIBIA FIBULA RIGHT WO CONTRAST   Final Result   Extensive soft tissue gas on the dorsal aspect of distal thigh, entire calf   and ankle and medial aspect of the foot. Findings suspicious for osteomyelitis involving the tibial plafond and medial   aspect of the medial cuneiform. MRI may be obtained to confirm. The findings were sent to the Radiology Results [address] at 12:53   pm on 4/18/2022to be communicated to a licensed caregiver. CT FEMUR RIGHT WO CONTRAST   Final Result   Extensive soft tissue gas on the dorsal aspect of distal thigh, entire calf   and ankle and medial aspect of the foot. Findings suspicious for osteomyelitis involving the tibial plafond and medial   aspect of the medial cuneiform. MRI may be obtained to confirm.       The findings were sent to the Radiology Results [address] at 12:53   pm on 4/18/2022to be communicated to a licensed caregiver. XR CHEST PORTABLE   Final Result   No acute cardiopulmonary abnormality. Right central venous catheter projects over the SVC. No pneumothorax. XR CHEST PORTABLE   Final Result   No acute process. XR FOOT RIGHT (MIN 3 VIEWS)   Final Result   Extensive soft tissue swelling and gas throughout the visualized right lower   extremity, concerning for gas forming infection and necrotizing fasciitis. XR TIBIA FIBULA RIGHT (2 VIEWS)   Final Result   Extensive soft tissue swelling and gas throughout the visualized right lower   extremity, concerning for gas forming infection and necrotizing fasciitis. Medical Records/Labs/Images review:   I personally reviewed and summarized previous records   All labs and imaging were reviewed independently     [address], a 57 y.o.-old female seen today for inpatient diabetes management     Diabetes Mellitus type 2   · Patient's diabetes is uncontrolled   · Currently on insulin drip   · Will help with transition to SQ insulin once DKA resolve   · Continue glucose check with meals and at bedtime   · Will titrate insulin dose based on the blood glucose trend & insulin requirement    Rt LE necrotizing Fasciitis   · S/p Rt AKA 4/18/2022   · Management per primary service     The above issues were reviewed with the patient who understood and agreed with the plan. Thank you for allowing us to participate in the care of this patient. Please do not hesitate to contact us with any additional questions. Rashard Winkler MD  Endocrinologist, [facility] Diabetes Care and Endocrinology   [address] [facility] [address]   Phone: [phone]  Fax: [phone]  --------------------------------------------  An electronic signature was used to authenticate this note.  Ben Dutta MD on 4/18/2022 at 6:52 PM

## 2022-04-18 NOTE — OP NOTE
[address] [address]                                OPERATIVE REPORT    PATIENT NAME: Marino Fairbanks                    :        1960  MED REC NO:   [id number]                            ROOM:  ACCOUNT NO:   [de-identified]                           ADMIT DATE: 2022  PROVIDER:     Scar Huerta DPM    DATE OF PROCEDURE:  2022    SURGEON:  Scar Huerta DPM.    ASSISTANT:  None. PREOPERATIVE DIAGNOSES:  Necrotizing fascitis, diabetic ulcer,  cellulitis, right foot and ankle. POSTOPERATIVE DIAGNOSES:  Necrotizing fascitis, diabetic ulcer,  cellulitis, right foot and ankle. PROCEDURE PERFORMED:  I and D abscess, wound debridement, right ankle. Deep wound cultures, right ankle and foot. ESTIMATED BLOOD LOSS:  Less than 5 mL. TOURNIQUET USE:  No tourniquet. ANESTHESIA:  Preop injection of 10 mL of 0.5% Marcaine plain. COMPLICATIONS:  None. SPECIMENS:  Fluid, tissue, tendon, right ankle. DESCRIPTION OF PROCEDURE:  The patient was brought to the OR and placed  in the supine position. The patient's right foot was prepped and draped  in the usual aseptic fashion. At this time, the medial aspect of the  right foot starting at the distal right hallux proximal to the medial  malleoli, the entire medial aspect of the right foot had a foul odor,  necrotic tissue which was black. At this time, using a #10-blade, an  incision was made through this medial aspect of the right ankle going  distal to the right hallux and MPJ joint. At this time, the incision  was made. A large amount of black, necrotic tissue and pus was noted. During the debridement, this was excisional debridement taken through  subcutaneous through the tendon to bone. It was noted that the entire  medial aspect of the right foot had no vascular supply. The tissue was  avascular, black eschar. This was over 20 cm.   Using

## 2022-04-18 NOTE — ED NOTES
spoke with wife in waiting room, explained situation will be a adult protective service case. Unable to see patient @ this time. Wife voices understanding.      Андрей Yuan RN  04/17/22 1615

## 2022-04-18 NOTE — BRIEF OP NOTE
Brief Postoperative Note      Patient: Suresh Rodrigues  YOB: 1960  MRN: [id number]    Date of Procedure: 4/17/2022    Pre-Op Diagnosis: necrotizing fascitis    Post-Op Diagnosis: Same and Diabetic ulcer       Procedure(s):  right foot ulcer deep wound cultures     Surgeon(s):  Otoniel Chris DPM    Assistant:  * No surgical staff found *    Anesthesia: Monitor Anesthesia Care    Estimated Blood Loss (mL): Minimal    Complications: None    Specimens:   ID Type Source Tests Collected by Time Destination   1 : diabetic ulcer Body Fluid Fluid CULTURE, ANAEROBIC, CULTURE, FUNGUS, GRAM STAIN, CULTURE, SURGICAL, CULTURE WITH SMEAR, ACID FAST BACILLIUS Otoniel Chris DPM 4/17/2022 2349        Implants:  * No implants in log *      Drains:   Urethral Catheter Non-latex;Straight-tip (Active)   Catheter Indications Need for fluid volume management of the critically ill patient in a critical care setting 04/17/22 2320       [REMOVED] External Urinary Catheter (Removed)       Findings: Avascular tissue deep abscess Formation right ankle necrotizing fasciitis with tissue right ankle.     Electronically signed by Otoniel Chris DPM on 4/18/2022 at 12:06 AM

## 2022-04-18 NOTE — PROGRESS NOTES
[facility] Progress Note    --------------------------------------------------------------------------------------  Assessment / Plan  · Necrotizing fasciitis of the right foot with sepsis  · Metabolic acidosis w elevated anion gap / lactic acidosis  · Hyponatremia and hyperkalemia likely due to acidosis  · NIDDM with PVD  · Hx HPL, CVAs    Meets WBC / HR sepsis criteria though pt is afebrile. Maintaining BP off of pressors. Leukocytosis is significant at 60.2. S/p OR late last night with podiatry with extensive debridement and with likely need for BKA. Vascular surgery consulted, checking stat CT scan to determine extent of pathology and they will be in this afternoon to evaluate level of amputation. Blood, wound cx sent; pt given cefepime, clindamycin, and vancomycin x1 with ID consulted, now on vanc and Zosyn. Acidosis should improve w IVF and infection control and would expect correction of electrolytes as pH corrects. Also possible contribution from hyperglycemia; endocrinology is been consulted for blood glucose management. Please see orders for further plan of care.  Code status  Full   DVT prophylaxis SCDs   Disposition  To be determined  --------------------------------------------------------------------------------------    Admission Date  4/17/2022  9:54 PM  Chief Complaint AMS, fatigue    Subjective  History of Present Illness  Radha Monroe is a 79-year-old female with past medical history pertinent for diabetes complicated by PVD and past history of diabetic ulcer of the right foot, as well as hyperlipidemia, multiple prior CVAs who had noted a pink discoloration of her right fifth toe few weeks ago; this progressed to involve her entire foot, causing more pain to the point where she was unable to walk on it. Issues with weakness, low appetite, nausea.   Patient had a foot covered up with a blanket for several days, family saw it last night, found it to be black, and made her come to the ER. Due to concern for gangrene/necrotizing fasciitis, urgent consults to podiatry and vascular surgery were called out. Podiatry had seen the patient in the ER and took her to the operating room last evening, where she underwent extensive debridement of the right foot and even debridement up into the ankle. Unfortunately, it was ultimately determined the patient will most likely require a below the knee amputation with vascular surgery. She was transferred to the intensive care unit postoperatively for vital signs are currently stable. She was seen at bedside in intensive care unit this morning during rounds, she is awake and semialert but encephalopathic. Denying current fevers and pain but at the same time seems fairly out of it. The case was discussed with pulmonology/intensive care as well as infectious disease in the ICU this morning.     Review of Systems - 12-point review of systems has been reviewed and is otherwise negative except as listed in the HPI    Objective  Physical Exam  Vitals: BP (!) 165/70   Pulse 108   Temp 98.9 °F (37.2 °C) (Axillary)   Resp 21   Ht 5' 3\" (1.6 m)   Wt 173 lb (78.5 kg)   SpO2 97%   BMI 30.65 kg/m²   General: well-developed, well-nourished, no acute distress, cooperative  Skin: warm, dry, intact, normal color without cyanosis  HEENT: normocephalic, atraumatic, mucous membranes normal  Respiratory: clear to auscultation bilaterally without respiratory distress  Cardiovascular: regular rate and rhythm without murmur / rub / gallop  Abdominal: soft, nontender, nondistended, normoactive bowel sounds  Extremities: no mottling, pulses intact, no edema, R foot dressed   Neurologic: awake, alert, confused  Psychiatric: normal affect, cooperative    Electronically signed by Eligio Douglas DO on 4/18/2022 at 9:08 AM

## 2022-04-18 NOTE — CONSULTS
Critical Care Admit/Consult Note         Patient - Iker Aguirre   MRN -  [id number]   [facility]   - 1960      Date of Admission -  2022  9:54 PM  Date of evaluation -  2022  0206/0206-A   Hospital Day - 1            ADMIT/CONSULT DETAILS     Reason for Admit/Consult   Necrotizing fasciitis  Hyperglycemia    Consulting Service/Physician   Consulting - Wayne Garcia DO  Primary Care Physician - DO LISA Abrams   The patient is a 57 y.o. female with significant past medical history of Type 2 diabetes and hyperlipidemia who was admitted from the ED 22 for right lower extremity necrotizing fasciitis. In the ED, patient was found to have gangrene of right foot with concern for gas on XRs. Labs remarkable for WBC 64.2, lactate 3.9. , however, pH 7.36 and beta hydroxybutyrate only mildly elevated at 1.81. Patient was given dose of vancomycin, cefepine, and clindamycin in the ED and podiatry was consulted. Patient was taken to the OR where she underwent I&D and debridement of RLE overnight. Past Medical History         Diagnosis Date    Bone marrow edema 2021    Abnl. C-spine MRI wo/mikel.  Patient refused MR C-spien with contrast, c/o out-of-pocket cost of other MRI's    DDD (degenerative disc disease), cervical 2021    Diabetes mellitus ([facility] [address]     Hyperlipidemia     Lumbar degenerative disc disease 03/15/2021    Multiple lacunar infarcts (HCC) 2021    Numbness and tingling 03/15/2021    Postural dizziness 03/15/2021    UTI (urinary tract infection)         Past Surgical History           Procedure Laterality Date    FOOT DEBRIDEMENT N/A 2022    right foot ulcer deep wound cultures  performed by Dorina Luu DPM at [address] Left 2016             Current Medications   Current Medications    atorvastatin  40 mg Oral Nightly    aspirin  81 mg Oral Daily    sodium chloride flush  10 mL IntraVENous 2 times per day    insulin glargine  15 Units SubCUTAneous Nightly    insulin lispro  0.08 Units/kg SubCUTAneous TID WC    insulin lispro  0-12 Units SubCUTAneous TID WC    insulin lispro  0-6 Units SubCUTAneous Nightly    vitamin D  50,000 Units Oral Weekly    clopidogrel  75 mg Oral Daily    piperacillin-tazobactam  3,375 mg IntraVENous Q8H    sodium chloride flush  5-40 mL IntraVENous 2 times per day    vancomycin  20 mg/kg IntraVENous Q24H     glucose, dextrose, glucagon (rDNA), dextrose, sodium chloride flush, sodium chloride, polyethylene glycol, acetaminophen **OR** acetaminophen, sodium chloride, sodium chloride flush, potassium chloride, magnesium sulfate, dextrose 5 % and 0.45 % NaCl, sodium chloride flush  IV Drips/Infusions   dextrose      sodium chloride 100 mL/hr at 04/18/22 0218    sodium chloride      sodium chloride      sodium chloride      sodium chloride      dextrose 5 % and 0.45 % NaCl       Home Medications  Medications Prior to Admission: atorvastatin (LIPITOR) 40 MG tablet, Take 1 tablet by mouth nightly  clopidogrel (PLAVIX) 75 MG tablet, Take 1 tablet by mouth daily  aspirin 81 MG EC tablet, Take 81 mg by mouth daily  acetaminophen (TYLENOL) 325 MG tablet, Take 650 mg by mouth every 6 hours as needed for Pain  vitamin D (ERGOCALCIFEROL) 1.25 MG (18854 UT) CAPS capsule, Take 1 capsule by mouth once a week  glipiZIDE (GLUCOTROL) 5 MG tablet, Take 1 tablet by mouth 2 times daily (before meals)    Diet/Nutrition   Diet NPO Exceptions are: Sips of Water with Meds    Allergies   Codeine and Pyridium [phenazopyridine hcl]    Social History   Tobacco   reports that she has been smoking cigarettes. She has a 30.00 pack-year smoking history. She has never used smokeless tobacco.    Alcohol     reports no history of alcohol use. Occupational history :    Family History   No family history on file.     Sleep History       ROS     REVIEW OF SYSTEMS:   unable to obtain, patient confused      Lines and Devices   RIJ- placed 22    Mechanical Ventilation Data   VENT SETTINGS (Comprehensive)  Vent Information  SpO2: 97 %  Additional Respiratory  Assessments  Pulse: 108  Resp: 21  SpO2: 97 %    ABG  No results found for: PH, PCO2, PO2, HCO3, O2SAT  No results found for: IFIO2, MODE, SETTIDVOL, SETPEEP        Vitals    height is 5' 3\" (1.6 m) and weight is 173 lb (78.5 kg). Her axillary temperature is 98.9 °F (37.2 °C). Her blood pressure is 165/70 (abnormal) and her pulse is 108. Her respiration is 21 and oxygen saturation is 97%. Temperature Range: Temp: 98.9 °F (37.2 °C) Temp  Av.7 °F (37.1 °C)  Min: 98.5 °F (36.9 °C)  Max: 98.9 °F (37.2 °C)  BP Range:  Systolic (75SGT), KAJAL:077 , Min:118 , HIT:888     Diastolic (49MNP), BYI:15, Min:63, Max:126    Pulse Range: Pulse  Av.7  Min: 84  Max: 110  Respiration Range: Resp  Av.9  Min: 12  Max: 30  Current Pulse Ox[de-identified]  SpO2: 97 %  24HR Pulse Ox Range:  SpO2  Av.2 %  Min: 94 %  Max: 100 %  Oxygen Amount and Delivery:        I/O (24 Hours)    Patient Vitals for the past 8 hrs:   BP Temp Temp src Pulse Resp SpO2 Height Weight   22 0600 (!) 165/70 -- -- 108 21 97 % -- --   22 0500 (!) 154/83 -- -- 103 21 98 % -- --   22 0400 (!) 157/86 98.9 °F (37.2 °C) Axillary 104 30 97 % -- --   22 0300 (!) 169/80 -- -- 89 24 95 % -- --   22 0230 (!) 163/86 -- -- 95 21 98 % -- --   22 0215 (!) 161/78 -- -- 100 14 100 % -- --   22 0200 (!) 175/126 -- -- 89 21 96 % -- --   22 0145 (!) 168/76 -- -- 87 12 98 % -- --   22 0130 (!) 163/81 -- -- 97 15 99 % 5' 3\" (1.6 m) --   22 0100 (!) 163/81 98.8 °F (37.1 °C) Axillary 95 14 98 % -- 173 lb (78.5 kg)       Intake/Output Summary (Last 24 hours) at 2022 0841  Last data filed at 2022 0500  Gross per 24 hour   Intake 1490 ml   Output 675 ml   Net 815 ml     I/O last 3 completed shifts:   In: 1687 [I.V.:1190; IV Piggyback:300]  Out: [address]   Date 04/18/22 0000 - 04/18/22 2359   Shift 4048-5978 2039-4679 5592-2359 24 Hour Total   INTAKE   I.V.(mL/kg) 1190(15.2)   1190(15.2)   IV Piggyback(mL/kg) 300(3.8)   300(3.8)   Shift Total(mL/kg) 5257(03)   1490(19)   OUTPUT   Urine(mL/kg/hr) 675(1.1)   675   Shift Total(mL/kg) 675(8.6)   675(8.6)   Weight (kg) 78.5 78.5 78.5 78.5     Patient Vitals for the past 96 hrs (Last 3 readings):   Weight   04/18/22 0100 173 lb (78.5 kg)   04/17/22 2300 188 lb (85.3 kg)       Exam         PHYSICAL EXAM:    General appearance - alert, NAD  Mental status - alert and oriented to person, place, and purpose, but not to year; states she is here for \"brice brice on her foot\"  Eyes - pupils equal and reactive, extraocular eye movements intact, sclera anicteric  Ears - external ear normal  Nose - normal and patent, no erythema, discharge or polyps  Mouth - mucous membranes moist, pharynx normal without lesions  Neck - supple, no significant adenopathy  Chest - clear to auscultation, no wheezes, rales or rhonchi, symmetric air entry  Heart - normal rate, regular rhythm, normal S1, S2, no murmurs, rubs, clicks or gallops  Abdomen - soft, nontender, nondistended, no masses or organomegaly  Neurological - alert, moving all extremities  Extremities - RLE wrapped; black first toe  Skin - first toe RLE black    Data   Old records and images have been reviewed    Lab Results   CBC     Lab Results   Component Value Date    WBC 60.2 04/18/2022    RBC 3.61 04/18/2022    HGB 11.0 04/18/2022    HCT 33.5 04/18/2022     04/18/2022    MCV 92.8 04/18/2022    MCH 30.5 04/18/2022    MCHC 32.8 04/18/2022    RDW 14.6 04/18/2022    LYMPHOPCT 2.6 04/18/2022    MONOPCT 2.4 04/18/2022    BASOPCT 0.3 04/18/2022    MONOSABS 1.45 04/18/2022    LYMPHSABS 1.56 04/18/2022    EOSABS 0.00 04/18/2022    BASOSABS 0.17 04/18/2022       BMP   Lab Results   Component Value Date     04/18/2022    K 5.6 04/18/2022    CL 95 04/18/2022    CO2 16 04/18/2022    BUN 46 04/18/2022    CREATININE 1.0 04/18/2022    GLUCOSE 385 04/18/2022    CALCIUM 8.9 04/18/2022       LFTS  Lab Results   Component Value Date    ALKPHOS 200 04/17/2022    ALT 9 04/17/2022    AST 12 04/17/2022    PROT 9.2 04/17/2022    BILITOT 0.9 04/17/2022    BILIDIR 0.3 04/17/2022    IBILI 0.6 04/17/2022    LABALBU 2.6 04/17/2022       INR  No results for input(s): PROTIME, INR in the last 72 hours. APTT  No results for input(s): APTT in the last 72 hours. Lactic Acid  Lab Results   Component Value Date    LACTA 3.3 04/18/2022    LACTA 1.2 07/31/2021        BNP   No results for input(s): BNP in the last 72 hours. Cultures     No results for input(s): BC in the last 72 hours. No results for input(s): Ronita Kimberley in the last 72 hours. No results for input(s): LABURIN in the last 72 hours. Radiology   XR TIBIA FIBULA RIGHT (2 VIEWS)    Result Date: 4/17/2022  EXAMINATION: 3 XRAY VIEWS OF THE RIGHT TIBIA AND FIBULA; THREE XRAY VIEWS OF THE RIGHT FOOT 4/17/2022 10:13 pm COMPARISON: None. HISTORY: ORDERING SYSTEM PROVIDED HISTORY: Pain TECHNOLOGIST PROVIDED HISTORY: Reason for exam:->Pain; ORDERING SYSTEM PROVIDED HISTORY: pain TECHNOLOGIST PROVIDED HISTORY: Reason for exam:->pain FINDINGS: No acute fracture or subluxation is identified. There is soft tissue swelling and gas within the right foot and tracking proximally to the level of the right knee. No radiopaque foreign body is identified. Extensive soft tissue swelling and gas throughout the visualized right lower extremity, concerning for gas forming infection and necrotizing fasciitis. XR FOOT RIGHT (MIN 3 VIEWS)    Result Date: 4/17/2022  EXAMINATION: 3 XRAY VIEWS OF THE RIGHT TIBIA AND FIBULA; THREE XRAY VIEWS OF THE RIGHT FOOT 4/17/2022 10:13 pm COMPARISON: None.  HISTORY: ORDERING SYSTEM PROVIDED HISTORY: Pain TECHNOLOGIST PROVIDED HISTORY: Reason for exam:->Pain; ORDERING SYSTEM PROVIDED HISTORY: pain TECHNOLOGIST PROVIDED HISTORY: Reason for exam:->pain FINDINGS: No acute fracture or subluxation is identified. There is soft tissue swelling and gas within the right foot and tracking proximally to the level of the right knee. No radiopaque foreign body is identified. Extensive soft tissue swelling and gas throughout the visualized right lower extremity, concerning for gas forming infection and necrotizing fasciitis. XR CHEST PORTABLE    Result Date: 4/17/2022  EXAMINATION: ONE XRAY VIEW OF THE CHEST 4/17/2022 8:13 pm COMPARISON: 07/31/2021 HISTORY: ORDERING SYSTEM PROVIDED HISTORY: eval for Pneumonia TECHNOLOGIST PROVIDED HISTORY: Reason for exam:->eval for Pneumonia FINDINGS: The lungs are without acute focal process. There is no effusion or pneumothorax. The cardiomediastinal silhouette is without acute process. The osseous structures are without acute process. No acute process. SYSTEMS ASSESSMENT    Neuro   AMS- baseline unknown at this time  Patient AAO to person, place, and purpose, but not year  Restless in bed, only momentarily redirectable    Previous history of CVA    Respiratory     Wean oxygen as tolerated.  Keep O2 sat 90-92%    Cardiovascular   On aspirin and plavix  Will defer to podiatry/vascular surgery regarding continuation of treatment in anticipation further surgical intervention    Gastrointestinal   Protonix ppx  NPO in anticipation of surgery    Renal   No acute issues; continue to monitor     Infectious Disease   Necrotizing fasciitis  S/p I&D and debridement 4/17/22  Received vanc, cefepime, and clindamycin in the ED  Currently on Vanc and zosyn  Podiatry and vascular surgery following; CT RLE ordered at vascular surgery's request, results pending  To OR today for amputation    ID consult      Hematology/Oncology   No chemical VTE ppx at this time in anticipation of surgery    Endocrine   Hyperglycemia; pH 7.36, beta hydroxybutyrate 1.81  Start DKA protocol  Consult endocrinology    Lactate 3.9 initially, downtrending to 3.3- continue to trend     Social/Spiritual/DNR/Other   Full Code      Tevin Melgar MD, PGY2  8:41 AM     Meghana Conrad you for asking us to see this complex patient. \"    Attending Physician Attestation: Dr. All Morse    Thank you very much for allowing me to see this patient in consultation and follow up. I personally saw, examined and provided care for the patient. Radiographs, labs and medication list were reviewed by me independently. I spoke with bedside nursing, respiratory therapists and consultants. Critical care services and times documented are independent of procedures and multidisciplinary rounds with Residents. Additionally comprehensive, multidisciplinary rounds were conducted with the MICU team. The case was discussed in detail and plans for care were established. Review of Residents documentation was conducted and revisions were made as appropriate. I agree with the the above documented information.      Physical Examination:  Vitals:   Vitals:    04/18/22 1000 04/18/22 1100 04/18/22 1200 04/18/22 1300   BP: (!) 151/96 137/83 (!) 149/80 116/81   Pulse: 112 113 110 104   Resp: 21 17 30 19   Temp:   97.8 °F (36.6 °C)    TempSrc:   Temporal    SpO2: 95% 96% 95% 98%   Weight:       Height:          General: No Acute Distress  HEENT: normocephalic, atruamatic  CVS: RRR, S1, S2, No S3 or S4  Abdomen: soft, NT, ND  Respiratory: decreased breath sounds at lung bases, no focal wheezes noted  Extremities: gangrenous right medial foot, no clubbing/cyanosis/edema      ASSESSMENT:  1.) Necrotizing Fasciitis and Gangrene of the Right lower Extremity   2.) DKA  3.) Leukocytosis   4.) Acute Kidney Injury   5.) Hypophosphatemia     In addition the following applies:    Check: serial labs, ESR, CRP  Medication Alterations: N/A, abx per ID  Procedures: await amputation per vascular surgery   Imaging: reviewed, CT right lower extremity   New Consultations: ID, Endocrinology   VENT: N/A    Access: Right IJ TLC  Consults: ID, Vascular Surgery, Podiatry, Endocrinology   Drips: Insulin   Nutrition: NPO  ABX: Vancomycin, Zosyn     Thank you for allowing me to participate in the care of this patient. Care reviewed with nursing staff, medical and surgical specialty care, primary care and the patient's family as available. Restraints are ordered when the patient can do harm to him/herself by pulling out devices. Critical Care Time: > 35 minutes excluding procedures    Sofiya Alas M.D.     Sofiya Alas MD  4/18/2022  2:27 PM

## 2022-04-18 NOTE — ANESTHESIA POSTPROCEDURE EVALUATION
Department of Anesthesiology  Postprocedure Note    Patient: Yana Elder  MRN: [id number]  YOB: 1960  Date of evaluation: 4/18/2022  Time:  12:18 AM     Procedure Summary     Date: 04/17/22 Room / Location: [facility] OR 01 / [facility]    Anesthesia Start: 2339 Anesthesia Stop: 04/18/22 0011    Procedures:       right foot ulcer deep wound cultures  (N/A Foot)      Procedure Not Yet Scheduled Diagnosis: (necrotizing fascitis)    Surgeons: [place] DPM Responsible Provider: [address] [place] [address]    Anesthesia Type: MAC ASA Status: 4 - Emergent          Anesthesia Type: MAC    Joy Phase I: Joy Score: 10    Joy Phase II: Joy Score: 10    Last vitals: Reviewed and per EMR flowsheets.        Anesthesia Post Evaluation    Patient location during evaluation: bedside  Patient participation: complete - patient participated  Level of consciousness: awake  Pain score: 3  Airway patency: patent  Nausea & Vomiting: no vomiting and no nausea  Complications: no  Cardiovascular status: hemodynamically stable  Respiratory status: acceptable  Hydration status: stable

## 2022-04-18 NOTE — PROGRESS NOTES
Podiatry Progress Note  4/18/2022   Rohan Kasper       SUBJECTIVE: Rohan Kasper is a 57 y.o. uncontrolled diabetic female s/p right foot I&D, debridement, biopsy (DOS 4/17/2022). Confused but AOx3 this AM.  Dressings changed. Significant necrotic muscle soft tissue and bone noted on dressing change. Positive crepitations extending proximally through the calf and into the popliteal region confirmed on x-ray as proximal extending gas infection. Discussed with vascular surgery who will take over surgical invention with proximal limb amputation as necessary. On room air with 95% SPO2, tachycardic with heart rate 112, respiratory rate 21. Temperature within normal limits. Confused this a.m. however denies any nausea, vomiting, diarrhea, fevers, chills, shortness of breath, chest pain. Past Medical History:   Diagnosis Date    Bone marrow edema 03/25/2021    Abnl. C-spine MRI wo/mikel. Patient refused MR C-spien with contrast, c/o out-of-pocket cost of other MRI's    DDD (degenerative disc disease), cervical 03/25/2021    Diabetes mellitus ([facility]     Hyperlipidemia     Lumbar degenerative disc disease 03/15/2021    Multiple lacunar infarcts ([facility] 03/25/2021    Numbness and tingling 03/15/2021    Postural dizziness 03/15/2021    UTI (urinary tract infection)         Past Surgical History:   Procedure Laterality Date    FOOT DEBRIDEMENT N/A 4/17/2022    right foot ulcer deep wound cultures  performed by Sujatha Oropeza DPM at [facility] 09/27/2016         No family history on file. Social History     Tobacco Use    Smoking status: Current Every Day Smoker     Packs/day: 1.00     Years: 30.00     Pack years: 30.00     Types: Cigarettes    Smokeless tobacco: Never Used   Substance Use Topics    Alcohol use: No        Prior to Admission medications    Medication Sig Start Date End Date Taking?  Authorizing Provider   atorvastatin (LIPITOR) 40 MG tablet Take 1 tablet by mouth nightly 8/27/21   Geoff King DO   clopidogrel (PLAVIX) 75 MG tablet Take 1 tablet by mouth daily 8/6/21   Geoff King DO   aspirin 81 MG EC tablet Take 81 mg by mouth daily    Historical Provider, MD   acetaminophen (TYLENOL) 325 MG tablet Take 650 mg by mouth every 6 hours as needed for Pain    Historical Provider, MD   vitamin D (ERGOCALCIFEROL) 1.25 MG (79678 UT) CAPS capsule Take 1 capsule by mouth once a week 2/25/21   Geoff King DO   glipiZIDE (GLUCOTROL) 5 MG tablet Take 1 tablet by mouth 2 times daily (before meals) 2/25/21   Geoff King DO        Codeine and Pyridium [phenazopyridine hcl]         OBJECTIVE:        Vitals:    04/18/22 1000   BP: (!) 151/96   Pulse: 112   Resp: 21   Temp:    SpO2: 95%                          EXAM:        Pt is AAOx3 but still a little confused, takes some time to think about responses, moderate distress    Vascular Exam:  DP and PT pulses faintly palpable right foot, palpable left foot. Right wet gangrenous changes right first and fifth toe, brisk CFT to digits 2 through 4 right foot. Diffuse nonpitting edema right lower extremity. Neuro Exam:   Diminished protective sensation bilaterally. Dermatologic Exam: Postsurgical debridement right foot with exposed muscle tendon and bone, diffusely necrotic strongly malodorous full-thickness necrotic bone and soft tissue. Positive crepitations extending from the right foot all the way up the proximal calf and into the popliteal region. Diffuse erythema periwound with seropurulent tannish-gray discharge throughout the right foot. Exposed plantar fascia no proximal streaking. MSK: Deferred musculoskeletal testing. Significant tenderness on palpation of periwound, tenderness on palpation to proximal calf with positive crepitations throughout.     Current Facility-Administered Medications   Medication Dose Route Frequency Provider Last Rate Last Admin    atorvastatin (LIPITOR) tablet 40 mg  40 mg Oral Nightly Madison Reeder MD        aspirin EC tablet 81 mg  81 mg Oral Daily Madison Arredondo MD   81 mg at 04/18/22 1015    glucose (GLUTOSE) 40 % oral gel 15 g  15 g Oral PRN Madison Arredondo MD        glucagon (rDNA) injection 1 mg  1 mg IntraMUSCular PRN Madison Arredondo MD        dextrose 5 % solution  100 mL/hr IntraVENous PRN Madison Arredondo MD        0.9 % sodium chloride infusion   IntraVENous Continuous Madison Arredondo  mL/hr at 04/18/22 1001 New Bag at 04/18/22 1001    sodium chloride flush 0.9 % injection 10 mL  10 mL IntraVENous 2 times per day Arline Edwards MD   10 mL at 04/18/22 1017    sodium chloride flush 0.9 % injection 10 mL  10 mL IntraVENous PRN Madison Arredondo MD        0.9 % sodium chloride infusion   IntraVENous PRN Madison Arredondo MD        polyethylene glycol (GLYCOLAX) packet 17 g  17 g Oral Daily PRN Arline Edwards MD        acetaminophen (TYLENOL) tablet 650 mg  650 mg Oral Q6H PRN Madison Arredondo MD        Or    acetaminophen (TYLENOL) suppository 650 mg  650 mg Rectal Q6H PRN Madison Arredondo MD        insulin glargine (LANTUS) injection vial 15 Units  15 Units SubCUTAneous Nightly Madison Arredondo MD        vitamin D (ERGOCALCIFEROL) capsule 50,000 Units  50,000 Units Oral Weekly Rhona Lezama DPM   50,000 Units at 04/18/22 1016    clopidogrel (PLAVIX) tablet 75 mg  75 mg Oral Daily Rhona Lezama DPM   75 mg at 04/18/22 1015    piperacillin-tazobactam (ZOSYN) 3,375 mg in dextrose 5 % 50 mL IVPB extended infusion (mini-bag)  3,375 mg IntraVENous Q8H Madison Arredondo MD   Stopped at 04/18/22 0630    0.9 % sodium chloride infusion  25 mL IntraVENous Q8H Madison Arredondo MD 12.5 mL/hr at 04/18/22 1003 25 mL at 04/18/22 1003    0.9 % sodium chloride infusion  25 mL IntraVENous PRN Rhona Lezama DPM        sodium chloride flush 0.9 % injection 5-40 mL  5-40 mL IntraVENous 2 times per day Rhona Lezama DPM        sodium chloride flush 0.9 % injection 5-40 mL  5-40 mL IntraVENous PRN Willa Calloway DPM        magnesium sulfate 1000 mg in dextrose 5% 100 mL IVPB  1,000 mg IntraVENous PRN Elke Kay MD        0.45 % sodium chloride infusion   IntraVENous Continuous Elke Kay MD        vancomycin 1500 mg in dextrose 5% 300 mL IVPB  20 mg/kg IntraVENous Q24H Madison Arredondo MD        povidone-iodine (BETADINE) 10 % external solution             potassium chloride 10 mEq/100 mL IVPB (Peripheral Line)  10 mEq IntraVENous PRN Merlin Fortis, MD        magnesium sulfate 1000 mg in dextrose 5% 100 mL IVPB  1,000 mg IntraVENous PRN Merlin Fortis, MD        sodium phosphate 10 mmol in dextrose 5 % 250 mL IVPB  10 mmol IntraVENous PRN Merlin Fortis, MD        Or    sodium phosphate 15 mmol in dextrose 5 % 250 mL IVPB  15 mmol IntraVENous PRN Merlin Fortis, MD        Or    sodium phosphate 20 mmol in dextrose 5 % 500 mL IVPB  20 mmol IntraVENous PRN Merlin Fortis, MD        dextrose 5 % and 0.45 % sodium chloride infusion   IntraVENous Continuous PRN Merlin Fortis, MD        insulin regular (HUMULIN R;NOVOLIN R) 100 Units in sodium chloride 0.9 % 100 mL infusion  0.1 Units/kg/hr IntraVENous Continuous Merlin Fortis, MD        dextrose bolus (hypoglycemia) 10% 125 mL  125 mL IntraVENous ASIAN Merlin Fortis, MD        Or    dextrose bolus (hypoglycemia) 10% 250 mL  250 mL IntraVENous PRN Merlin Fortis, MD        sodium chloride flush 0.9 % injection 10 mL  10 mL IntraVENous PRN Willa Calloway DPM            Lab Results   Component Value Date    WBC 60.2 (H) 04/18/2022    HCT 33.5 (L) 04/18/2022    HGB 11.0 (L) 04/18/2022     04/18/2022     (L) 04/18/2022    K 5.6 (H) 04/18/2022    CL 95 (L) 04/18/2022    CO2 16 (L) 04/18/2022    BUN 46 (H) 04/18/2022    CREATININE 1.0 04/18/2022    GLUCOSE 385 (H) 04/18/2022    CRP 61.2 (H) 04/17/2022       ASSESSMENT:  -S/p B right foot incision and drainage, debridement, bone biopsy entheses DOS 4/17/2022)  - Gas gangrene   - Cellulitis with necrosis of muscle and bone right foot, POA, postsurgical debridement.   -Acute osteomyelitis right foot, POA  -Uncontrolled type 2 diabetes mellitus peripheral neuropathy     PLAN:  - Examined and evaluated  - All labs, imaging, and charts reviewed    -WBC 60.2, CRP 61.2,   -X-ray tib-fib, foot and ankle 4/17/2022: Extensive soft tissue swelling and gas visualized throughout the right lower extremity extending from the foot to the proximal calf and slightly above the knee. -Intraoperative cultures 4/17/2022: Pending  -Vascular surgery consulted. To follow with definitive proximal limb amputation secondary to infection. Discussed with vascular surgery. -ABX: Vanco/cefepime/Clinda, ID following  -Central line in place. Patient admitted to ICU   -We will defer further surgical invention to vascular surgery at this time. We will continue with local wound care. -Continue to follow    D/W: Dr. Amparo Baker    Thank you for involving podiatry in this patients care. Please do not hesitate to call with any questions or concerns.      Cedric Flores Podiatry PGY2  4/18/2022   11:23 AM

## 2022-04-18 NOTE — CONSULTS
[address] [place] [address] [facility]    Consultation Note     Admit Date: 4/17/2022  9:54 PM    Reason for Consult:   Necrotizing fasciitis right lower extremity    Attending Physician:  Willa Mcgee DO     Chief Complaint: Right foot dark with odor and family found patient confused    HISTORY OF PRESENT ILLNESS:   The patient is a 57 y.o. woman not known to the Infectious Diseases service. The patient is admitted through the emergency room and after evaluation was taken to surgery for necrotizing fasciitis diabetic foot ulcer right foot and ankle. She underwent I&D and debridement; cultures are pending. In the emergency room she was quite prerenal with a BUN of 46 creatinine 1.0 and in DKA with a beta hydroxybutyrate of 1.81. Blood glucose was 385 and lactic acid was 3.9. She was started on cefepime clindamycin and currently she is on Zosyn and vancomycin. Past Medical History:        Diagnosis Date    Bone marrow edema 03/25/2021    Abnl. C-spine MRI wo/mikel.  Patient refused MR C-spien with contrast, c/o out-of-pocket cost of other MRI's    DDD (degenerative disc disease), cervical 03/25/2021    Diabetes mellitus ([facility]     Hyperlipidemia     Lumbar degenerative disc disease 03/15/2021    Multiple lacunar infarcts ([facility]) 03/25/2021    Numbness and tingling 03/15/2021    Postural dizziness 03/15/2021    UTI (urinary tract infection)      Past Surgical History:        Procedure Laterality Date    FOOT DEBRIDEMENT N/A 4/17/2022    right foot ulcer deep wound cultures  performed by Guillermina Funes DPM at [facility] 09/27/2016     Current Medications:   Scheduled Meds:   atorvastatin  40 mg Oral Nightly    aspirin  81 mg Oral Daily    sodium chloride flush  10 mL IntraVENous 2 times per day    insulin glargine  15 Units SubCUTAneous Nightly    insulin lispro  0.08 Units/kg SubCUTAneous TID WC    insulin lispro  0-12 Units SubCUTAneous TID WC    insulin lispro  0-6 Units SubCUTAneous Nightly    vitamin D  50,000 Units Oral Weekly    clopidogrel  75 mg Oral Daily    piperacillin-tazobactam  3,375 mg IntraVENous Q8H    sodium chloride flush  5-40 mL IntraVENous 2 times per day    vancomycin  20 mg/kg IntraVENous Q24H    povidone-iodine         Continuous Infusions:   dextrose      sodium chloride 100 mL/hr at 22 1001    sodium chloride      sodium chloride 25 mL (22 1003)    sodium chloride      sodium chloride      dextrose 5 % and 0.45 % NaCl       PRN Meds:glucose, dextrose, glucagon (rDNA), dextrose, sodium chloride flush, sodium chloride, polyethylene glycol, acetaminophen **OR** acetaminophen, sodium chloride, sodium chloride flush, potassium chloride, magnesium sulfate, dextrose 5 % and 0.45 % NaCl, sodium chloride flush    Allergies:  Codeine and Pyridium [phenazopyridine hcl]    Social History:   Social History     Socioeconomic History    Marital status:      Spouse name: Not on file    Number of children: Not on file    Years of education: Not on file    Highest education level: Not on file   Occupational History    Not on file   Tobacco Use    Smoking status: Current Every Day Smoker     Packs/day: 1.00     Years: 30.00     Pack years: 30.00     Types: Cigarettes    Smokeless tobacco: Never Used   Substance and Sexual Activity    Alcohol use: No    Drug use: No    Sexual activity: Yes     Partners: Female   Other Topics Concern    Not on file   Social History Narrative        DIABETES--DX 1-09    SMOKER    WEIGHT    OA    CHRONIC LOW BACK PAIN    BULGE LUMBAR DISC----WC WITH DR GARY    ELEV TSH IN PAST    LIPID    AFTERNOON SHIFT [employer]ODUCTS    SEVERE NON COMPLIANCE    OBESITY    BRO  2013 AGE 47 MI--SMOKER---OBESE    ELEV WBC 12-14    ELEV CREA 12-14    PROTEINURIA    UTI 1-16    [employer] CO    ER WITH URINE RETENTION 6-16-----FLEY--UROL    TOTAL L;EFT NEPHRECTOMY ---RENAL CELL CARCINOMA----- 9-16 DR OLIVA----TWO    MASS LEFT KIDNEY----CYST ON RIGHT KIDNEY    [place]    Unsteady gait, multifocused CVA, seeing Dr. Nasima Quinones. Surgical consult 5-21    Mri    brain  3-21 iwht  radha of old  cva----referred dr Crystal Brewer  5-21 with dr schreiber with  two diff neuroapthy---    Cva  speech left sided weakness   8-21     Social Determinants of Health     Financial Resource Strain:     Difficulty of Paying Living Expenses: Not on file   Food Insecurity:     Worried About Running Out of Food in the Last Year: Not on file    [place] of Food in the Last Year: Not on file   Transportation Needs:     Lack of Transportation (Medical): Not on file    Lack of Transportation (Non-Medical): Not on file   Physical Activity:     Days of Exercise per Week: Not on file    Minutes of Exercise per Session: Not on file   Stress:     Feeling of Stress : Not on file   Social Connections:     Frequency of Communication with Friends and Family: Not on file    Frequency of Social Gatherings with Friends and Family: Not on file    Attends Congregation Services: Not on file    Active Member of [address] [place] [address] [employer] or Organizations: Not on file    Attends Club or Organization Meetings: Not on file    Marital Status: Not on file   Intimate Partner Violence:     Fear of Current or Ex-Partner: Not on file    Emotionally Abused: Not on file    Physically Abused: Not on file    Sexually Abused: Not on file   Housing Stability:     Unable to Pay for Housing in the Last Year: Not on file    Number of [place] in the Last Year: Not on file    Unstable Housing in the Last Year: Not on file       Family History:   No family history on file. . Otherwise non-pertinent to the chief complaint. REVIEW OF SYSTEMS:    CONSTITUTIONAL:  No chills, fevers or night sweats. No loss of weight. EYES:  No double vision or drainage from eyes, ears or throat. HEENT:  No neck stiffness. No dysphagia.  No drainage from eyes, ears or throat  RESPIRATORY:  No cough, productive sputum or hemoptysis. CARDIOVASCULAR:  No chest pain, palpitations, orthopnea or dyspnea on exertion. GASTROINTESTINAL:  No nausea, vomiting, diarrhea or constipation or hematochezia   GENITOURINARY:  No frequency burning dysuria or hematuria. INTEGUMENT/BREAST:  No rash or breast masses. HEMATOLOGIC/LYMPHATIC:  No lymphadenopathy or blood dyscrasics. ALLERGIC/IMMUNOLOGIC:  No anaphylaxis. ENDOCRINE:  No polyuria or polydipsia or temperature intolerance. MUSCULOSKELETAL: See history of present illness  NEUROLOGICAL:  No focal motor sensory deficit. BEHAVIOR/PSYCH:  No psychosis. PHYSICAL EXAM:    Vitals:    BP (!) 165/70   Pulse 108   Temp 98.9 °F (37.2 °C) (Axillary)   Resp 21   Ht 5' 3\" (1.6 m)   Wt 173 lb (78.5 kg)   SpO2 97%   BMI 30.65 kg/m²   Constitutional: The patient is awake, encephalopathic at this time  Skin: Warm and dry. No rashes were noted. No jaundice. 4/17/2022    HEENT: Eyes show round, and reactive pupils. Moist mucous membranes, no ulcerations, no thrush. Neck: Supple to movements. No lymphadenopathy. Chest: No use of accessory muscles to breathe. Symmetrical expansion. Auscultation reveals no wheezing, crackles, or rhonchi. Cardiovascular: S1 and S2 are rhythmic and regular. No murmurs appreciated. Abdomen: Positive bowel sounds to auscultation. Benign to palpation. No masses felt. No hepatosplenomegaly. Genitourinary: Female  Extremities: No clubbing, no cyanosis, no edema. Musculoskeletal: Equal and symmetrical; right leg rash with foul odor present  Neurological: Peripheral neuropathy  Lines: peripheral      CBC+dif:  Recent Labs     04/17/22 2209 04/17/22 2209 04/18/22  0250   WBC 64.2*  --  60.2*   HGB 12.4   < > 11.0*   HCT 37.5   < > 33.5*   MCV 92.4   < > 92.8      < > 318   NEUTROABS 58.78*   < > 55.07*    < > = values in this interval not displayed.      Lab Results   Component Value Date    CRP 61. 2 (H) 04/17/2022     No results found for: CRP  Lab Results   Component Value Date    SEDRATE 103 (H) 04/17/2022    SEDRATE 42 (H) 03/15/2021     Lab Results   Component Value Date    ALT 9 04/17/2022    AST 12 04/17/2022    ALKPHOS 200 (H) 04/17/2022    BILITOT 0.9 04/17/2022     Lab Results   Component Value Date     04/18/2022    K 5.6 04/18/2022    CL 95 04/18/2022    CO2 16 04/18/2022    BUN 46 04/18/2022    CREATININE 1.0 04/18/2022    GFRAA >60 04/18/2022    LABGLOM 56 04/18/2022    GLUCOSE 385 04/18/2022    PROT 9.2 04/17/2022    LABALBU 2.6 04/17/2022    CALCIUM 8.9 04/18/2022    BILITOT 0.9 04/17/2022    ALKPHOS 200 04/17/2022    AST 12 04/17/2022    ALT 9 04/17/2022       Lab Results   Component Value Date    PROTIME 11.3 07/31/2021    INR 1.0 07/31/2021       Lab Results   Component Value Date    TSH 2.050 02/24/2021       Lab Results   Component Value Date    COLORU Yellow 04/17/2022    PHUR 6.0 04/17/2022    WBCUA 0-1 04/17/2022    RBCUA NONE 04/17/2022    BACTERIA NONE SEEN 04/17/2022    CLARITYU Clear 04/17/2022    SPECGRAV 1.015 04/17/2022    LEUKOCYTESUR Negative 04/17/2022    UROBILINOGEN 0.2 04/17/2022    BILIRUBINUR Negative 04/17/2022    BLOODU MODERATE 04/17/2022    GLUCOSEU >=1000 04/17/2022       No results found for: BBX8GQH, BEART, T9DQHQBU, PHART, THGBART, PHM0KOT, PO2ART, UQT3DSK  Radiology:  XR CHEST PORTABLE   Final Result   No acute cardiopulmonary abnormality. Right central venous catheter projects over the SVC. No pneumothorax. XR CHEST PORTABLE   Final Result   No acute process. XR FOOT RIGHT (MIN 3 VIEWS)   Final Result   Extensive soft tissue swelling and gas throughout the visualized right lower   extremity, concerning for gas forming infection and necrotizing fasciitis.          XR TIBIA FIBULA RIGHT (2 VIEWS)   Final Result   Extensive soft tissue swelling and gas throughout the visualized right lower   extremity, concerning for gas forming infection and necrotizing fasciitis. VL LOWER EXTREMITY ARTERIAL SEGMENTAL PRESSURES W PPG    (Results Pending)       Microbiology:  Pending  No results for input(s): BC in the last 72 hours. No results for input(s): ORG in the last 72 hours. No results for input(s): Carly Weston in the last 72 hours. No results for input(s): STREPNEUMAGU in the last 72 hours. No results for input(s): LP1UAG in the last 72 hours. No results for input(s): ASO in the last 72 hours. No results for input(s): CULTRESP in the last 72 hours. Assessment:  · Necrotizing fasciitis with gangrene of the right lower extremity  · DKA  · Intravascular volume contraction  · Leukocytosis related to the above    Plan:    · Cont Zosyn and vancomycin  · Amputation at the level of vascular supply  · Check cultures  · Baseline ESR, CRP  · Monitor labs  · Will follow with you    Thank you for having us see this patient in consultation. I will be discussing this case with the treating physicians.       Electronically signed by Pepe Orta MD on 4/18/2022 at 10:31 AM

## 2022-04-18 NOTE — ED PROVIDER NOTES
65-year-old female history of diabetes presents emergency department with right foot infection patient is also been apparently lying in bed for 3 days. Per family the wound was noted Monday but has not been looked at in several days and when they looked that today her foot was black and they are concerned and called ambulance. She is also been more confused and patient not been doing well at home. Patient states her podiatrist is Dr. Jessa Orozco she states she has been told that it looks bad but she did not want to deal with it so she waited hoping would go away. Patient now presents for concern for infection    The history is provided by the patient. Leg Injury  Location:  Foot  Time since incident:  4 days  Foot location:  R foot  Pain details:     Quality:  Aching    Radiates to:  Does not radiate    Severity:  Moderate    Onset quality:  Gradual    Duration:  4 days    Timing:  Intermittent    Progression:  Worsening  Relieved by:  Nothing  Worsened by:  Nothing  Ineffective treatments:  None tried  Associated symptoms: no back pain, no decreased ROM, no fever and no swelling         Review of Systems   Constitutional: Negative for chills and fever. HENT: Negative for ear pain, sinus pressure and sore throat. Eyes: Negative for pain, discharge and redness. Respiratory: Negative for cough, shortness of breath and wheezing. Cardiovascular: Negative for chest pain. Gastrointestinal: Negative for abdominal distention, diarrhea, nausea and vomiting. Genitourinary: Negative for dysuria and frequency. Musculoskeletal: Negative for arthralgias and back pain. Skin: Positive for wound. Negative for rash. Neurological: Negative for weakness and headaches. Hematological: Negative for adenopathy. All other systems reviewed and are negative. Physical Exam  Constitutional:       General: She is in acute distress. Appearance: She is ill-appearing.    HENT:      Head: Normocephalic and atraumatic. Eyes:      Extraocular Movements: Extraocular movements intact. Pupils: Pupils are equal, round, and reactive to light. Cardiovascular:      Rate and Rhythm: Normal rate and regular rhythm. Pulses:           Dorsalis pedis pulses are detected w/ Doppler on the right side. Pulmonary:      Effort: Pulmonary effort is normal.   Abdominal:      Palpations: Abdomen is soft. Musculoskeletal:         General: Normal range of motion. Cervical back: Normal range of motion and neck supple. Feet:      Comments: Significant cellulitis and black coloring to right foot see image below. Skin:     General: Skin is warm. Capillary Refill: Capillary refill takes less than 2 seconds. Neurological:      Mental Status: She is alert and oriented to person, place, and time. Procedures   EKG: This EKG is signed and interpreted by the EP. Time: 22:58  Rate: 114  Rhythm: Sinus  Interpretation: sinus tachycardia  Comparison: stable as compared to patient's most recent EKG    MDM  Number of Diagnoses or Management Options  Diabetic ketoacidosis without coma associated with type 1 diabetes mellitus ([facility]  Necrotizing fasciitis ([facility]  Sepsis due to cellulitis [facility])  Diagnosis management comments: Patient seen and examined. Evaluation reveals concerns for necrotizing fasciitis on exam stat consult to podiatry and vascular surgery. Podiatry plans to take patient to operating room. Lab work reveals concerns for early diabetic ketoacidosis patient was already in the operating room and these lab work results came back I did update the hospitalist Dr. Bayron Valles on these. Case was discussed with the intensive care unit who will accept patient to ICU after surgery antibiotics were initiated including vancomycin and cefepime and Cleocin 900 mg IV. ED Course as of 04/17/22 2232   Lei Necessary Apr 17, 2022 2221 Discussed case with podiatry who requested MRI be ordered.   They will be here in 45 minutes to evaluate the patient. Consult placed to vascular surgery [CF]   64 Spoke with Dr. Lilly Bello vascular surgery. Believes patient needs amputation [CF]      ED Course User Index  [CF] Amy Davis DO     --------------------------------------------- PAST HISTORY ---------------------------------------------  Past Medical History:  has a past medical history of Bone marrow edema, DDD (degenerative disc disease), cervical, Diabetes mellitus ([facility] Hyperlipidemia, Lumbar degenerative disc disease, Multiple lacunar infarcts ([facility] Numbness and tingling, Postural dizziness, and UTI (urinary tract infection). Past Surgical History:  has a past surgical history that includes total nephrectomy (Left, 09/27/2016). Social History:  reports that she has been smoking cigarettes. She has a 30.00 pack-year smoking history. She has never used smokeless tobacco. She reports that she does not drink alcohol and does not use drugs. Family History: family history is not on file. The patients home medications have been reviewed.     Allergies: Codeine and Pyridium [phenazopyridine hcl]    -------------------------------------------------- RESULTS -------------------------------------------------    Lab  Results for orders placed or performed during the hospital encounter of 04/17/22   CBC with Auto Differential   Result Value Ref Range    WBC 64.2 (H) 4.5 - 11.5 E9/L    RBC 4.06 3.50 - 5.50 E12/L    Hemoglobin 12.4 11.5 - 15.5 g/dL    Hematocrit 37.5 34.0 - 48.0 %    MCV 92.4 80.0 - 99.9 fL    MCH 30.5 26.0 - 35.0 pg    MCHC 33.1 32.0 - 34.5 %    RDW 14.6 11.5 - 15.0 fL    Platelets 833 591 - 341 E9/L    MPV 10.7 7.0 - 12.0 fL   Urinalysis   Result Value Ref Range    Color, UA Yellow Straw/Yellow    Clarity, UA Clear Clear    Glucose, Ur >=1000 (A) Negative mg/dL    Bilirubin Urine Negative Negative    Ketones, Urine 15 (A) Negative mg/dL    Specific Gravity, UA 1.015 1.005 - 1.030    Blood, Urine MODERATE (A) Negative    pH, UA 6.0 5.0 - 9.0    Protein, UA 30 (A) Negative mg/dL    Urobilinogen, Urine 0.2 <2.0 E.U./dL    Nitrite, Urine Negative Negative    Leukocyte Esterase, Urine Negative Negative   pH, venous   Result Value Ref Range    pH, Stew 7.36 7.35 - 7.45   Sedimentation Rate   Result Value Ref Range    Sed Rate 103 (H) 0 - 20 mm/Hr   Lactate, Sepsis   Result Value Ref Range    Lactic Acid, Sepsis 3.9 (HH) 0.5 - 1.9 mmol/L   Microscopic Urinalysis   Result Value Ref Range    WBC, UA 0-1 0 - 5 /HPF    RBC, UA NONE 0 - 2 /HPF    Epithelial Cells, UA FEW /HPF    Bacteria, UA NONE SEEN None Seen /HPF   CK   Result Value Ref Range    Total CK 84 20 - 180 U/L   Troponin   Result Value Ref Range    Troponin, High Sensitivity 28 (H) 0 - 9 ng/L   Beta-Hydroxybutyrate   Result Value Ref Range    Beta-Hydroxybutyrate 1.81 (H) 0.02 - 0.27 mmol/L   Basic Metabolic Panel   Result Value Ref Range    Sodium 128 (L) 132 - 146 mmol/L    Potassium 5.4 (H) 3.5 - 5.0 mmol/L    Chloride 90 (L) 98 - 107 mmol/L    CO2 18 (L) 22 - 29 mmol/L    Anion Gap 20 (H) 7 - 16 mmol/L    Glucose 399 (H) 74 - 99 mg/dL    BUN 49 (H) 6 - 23 mg/dL    CREATININE 1.3 (H) 0.5 - 1.0 mg/dL    GFR Non-African American 41 >=60 mL/min/1.73    GFR African American 50     Calcium 9.7 8.6 - 10.2 mg/dL   Hepatic Function Panel   Result Value Ref Range    Total Protein 9.2 (H) 6.4 - 8.3 g/dL    Albumin 2.6 (L) 3.5 - 5.2 g/dL    Alkaline Phosphatase 200 (H) 35 - 104 U/L    ALT 9 0 - 32 U/L    AST 12 0 - 31 U/L    Total Bilirubin 0.9 0.0 - 1.2 mg/dL   Brain Natriuretic Peptide   Result Value Ref Range    Pro-BNP 4,682 (H) 0 - 125 pg/mL   Lipase   Result Value Ref Range    Lipase 33 13 - 60 U/L   SPECIMEN REJECTION   Result Value Ref Range    Rejected Test bmp liver lipas     Reason for Rejection see below    EKG 12 Lead   Result Value Ref Range    Ventricular Rate 114 BPM    Atrial Rate 114 BPM    P-R Interval 130 ms    QRS Duration 72 ms    Q-T Interval 324 ms    QTc Calculation (Bazett) 446 ms    P Axis 67 degrees    R Axis 55 degrees    T Axis 69 degrees       Radiology  XR TIBIA FIBULA RIGHT (2 VIEWS)    Result Date: 4/17/2022  EXAMINATION: 3 XRAY VIEWS OF THE RIGHT TIBIA AND FIBULA; THREE XRAY VIEWS OF THE RIGHT FOOT 4/17/2022 10:13 pm COMPARISON: None. HISTORY: ORDERING SYSTEM PROVIDED HISTORY: Pain TECHNOLOGIST PROVIDED HISTORY: Reason for exam:->Pain; ORDERING SYSTEM PROVIDED HISTORY: pain TECHNOLOGIST PROVIDED HISTORY: Reason for exam:->pain FINDINGS: No acute fracture or subluxation is identified. There is soft tissue swelling and gas within the right foot and tracking proximally to the level of the right knee. No radiopaque foreign body is identified. Extensive soft tissue swelling and gas throughout the visualized right lower extremity, concerning for gas forming infection and necrotizing fasciitis. XR FOOT RIGHT (MIN 3 VIEWS)    Result Date: 4/17/2022  EXAMINATION: 3 XRAY VIEWS OF THE RIGHT TIBIA AND FIBULA; THREE XRAY VIEWS OF THE RIGHT FOOT 4/17/2022 10:13 pm COMPARISON: None. HISTORY: ORDERING SYSTEM PROVIDED HISTORY: Pain TECHNOLOGIST PROVIDED HISTORY: Reason for exam:->Pain; ORDERING SYSTEM PROVIDED HISTORY: pain TECHNOLOGIST PROVIDED HISTORY: Reason for exam:->pain FINDINGS: No acute fracture or subluxation is identified. There is soft tissue swelling and gas within the right foot and tracking proximally to the level of the right knee. No radiopaque foreign body is identified. Extensive soft tissue swelling and gas throughout the visualized right lower extremity, concerning for gas forming infection and necrotizing fasciitis.      XR CHEST PORTABLE    Result Date: 4/17/2022  EXAMINATION: ONE XRAY VIEW OF THE CHEST 4/17/2022 8:13 pm COMPARISON: 07/31/2021 HISTORY: ORDERING SYSTEM PROVIDED HISTORY: eval for Pneumonia TECHNOLOGIST PROVIDED HISTORY: Reason for exam:->eval for Pneumonia FINDINGS: The lungs are without acute focal process. There is no effusion or pneumothorax. The cardiomediastinal silhouette is without acute process. The osseous structures are without acute process. No acute process. EKG: This EKG is signed and interpreted by me. Rate: 114  Rhythm: Sinus  Interpretation: non-specific EKG  Comparison: changes compared to previous EKG      ------------------------- NURSING NOTES AND VITALS REVIEWED ---------------------------  Date / Time Roomed:  4/17/2022  9:54 PM  ED Bed Assignment:  OR POOL/NONE    The nursing notes within the ED encounter and vital signs as below have been reviewed. Patient Vitals for the past 24 hrs:   BP Temp Temp src Pulse Resp SpO2 Weight   04/18/22 0010    84 16 97 %    04/17/22 2300 (!) 132/90   110 16 98 % 188 lb (85.3 kg)   04/17/22 2203 126/80 98.7 °F (37.1 °C) Oral 110 16 98 %        Oxygen Saturation Interpretation: Normal      ------------------------------------------ PROGRESS NOTES ------------------------------------------    I have spoken with the patient and discussed todays results, in addition to providing specific details for the plan of care and counseling regarding the diagnosis and prognosis. Their questions are answered at this time and they are agreeable with the plan.      --------------------------------- ADDITIONAL PROVIDER NOTES ---------------------------------  This patient's ED course included: a personal history and physicial examination, re-evaluation prior to disposition, multiple bedside re-evaluations, IV medications, cardiac monitoring, continuous pulse oximetry and complex medical decision making and emergency management    This patient has remained unchanged and been closely monitored during their ED course. Please note that the withdrawal or failure to initiate urgent interventions for this patient would likely result in a life threatening deterioration or permanent disability.       Accordingly this patient received 30 minutes of critical care time, excluding separately billable procedures. Clinical Impression  1. Necrotizing fasciitis ([facility]    2. Diabetic ketoacidosis without coma associated with type 1 diabetes mellitus ([facility]    3. Sepsis due to cellulitis [facility])          Disposition  Patient's disposition: Admit to operating room  Patient's condition is serious.        Candace Garcia DO  04/18/22 [facility]

## 2022-04-18 NOTE — PROGRESS NOTES
Pt. Is awake alert to self. She is confused and disoriented to place, situation and time. Color is fair skin is warm and dry. Assessment at documented. The smell from her right foot is putrid and has filled the entire room. Pt. Maliky Link herself to her right side. Right leg is elevated on a pillow.

## 2022-04-18 NOTE — CARE COORDINATION
Social work / Discharge Planning:         Per RN, patient is alert but confused. She is from home with her spouse Dean Holt. Patient going for testing at this time with amputation planned. Referral made to [facility] to follow.    Electronically signed by ROLANDA Roberson on 4/18/2022 at 12:35 PM

## 2022-04-18 NOTE — ANESTHESIA PRE PROCEDURE
Department of Anesthesiology  Preprocedure Note       Name:  Travis Mann   Age:  57 y.o.  :  1960                                          MRN:  [id number]         Date:  2022      Surgeon: Arturo Nelson):  Franc Shaw MD    Procedure:  RIGHT LOWER EXTREMITY GUILLOTINE AMPUTATION AT ANKLE    Medications prior to admission:   Prior to Admission medications    Medication Sig Start Date End Date Taking?  Authorizing Provider   atorvastatin (LIPITOR) 40 MG tablet Take 1 tablet by mouth nightly 21   Geoff King DO   clopidogrel (PLAVIX) 75 MG tablet Take 1 tablet by mouth daily 21   Geoff King DO   aspirin 81 MG EC tablet Take 81 mg by mouth daily    Historical Provider, MD   acetaminophen (TYLENOL) 325 MG tablet Take 650 mg by mouth every 6 hours as needed for Pain    Historical Provider, MD   vitamin D (ERGOCALCIFEROL) 1.25 MG (43775 UT) CAPS capsule Take 1 capsule by mouth once a week 21   Geoff King DO   glipiZIDE (GLUCOTROL) 5 MG tablet Take 1 tablet by mouth 2 times daily (before meals) 21   Geoff King DO       Current medications:    Current Facility-Administered Medications   Medication Dose Route Frequency Provider Last Rate Last Admin    atorvastatin (LIPITOR) tablet 40 mg  40 mg Oral Nightly Madison Arredondo MD        aspirin EC tablet 81 mg  81 mg Oral Daily Madison Arredondo MD   81 mg at 22 1015    glucose (GLUTOSE) 40 % oral gel 15 g  15 g Oral PRN Madison Arredondo MD        glucagon (rDNA) injection 1 mg  1 mg IntraMUSCular PRN Madison Arredondo MD        dextrose 5 % solution  100 mL/hr IntraVENous PRN Madison Arredondo MD        0.9 % sodium chloride infusion   IntraVENous Continuous Colt Cutler  mL/hr at 22 1136 Rate Change at 22 1136    sodium chloride flush 0.9 % injection 10 mL  10 mL IntraVENous 2 times per day Madison Arredondo MD   10 mL at 22 1017    sodium chloride flush 0.9 % injection 10 mL  10 mL IntraVENous PRN Madison Arredondo MD        0.9 % sodium chloride infusion   IntraVENous PRN Madison Arredondo MD        polyethylene glycol (GLYCOLAX) packet 17 g  17 g Oral Daily PRN Olivier Koroma MD        acetaminophen (TYLENOL) tablet 650 mg  650 mg Oral Q6H PRN Madison Arredondo MD        Or    acetaminophen (TYLENOL) suppository 650 mg  650 mg Rectal Q6H PRN Olivier Koroma MD        vitamin D (ERGOCALCIFEROL) capsule 50,000 Units  50,000 Units Oral Weekly Keshia Eckert DPM   50,000 Units at 04/18/22 1016    clopidogrel (PLAVIX) tablet 75 mg  75 mg Oral Daily Keshia Eckert DPM   75 mg at 04/18/22 1015    piperacillin-tazobactam (ZOSYN) 3,375 mg in dextrose 5 % 50 mL IVPB extended infusion (mini-bag)  3,375 mg IntraVENous Q8H Madison Arredondo MD 12.5 mL/hr at 04/18/22 1150 3,375 mg at 04/18/22 1150    0.9 % sodium chloride infusion  25 mL IntraVENous Q8H Madison Arredondo MD 12.5 mL/hr at 04/18/22 1003 25 mL at 04/18/22 1003    0.9 % sodium chloride infusion  25 mL IntraVENous PRN Keshia Eckert DPM        sodium chloride flush 0.9 % injection 5-40 mL  5-40 mL IntraVENous 2 times per day Keshia Eckert DPM        sodium chloride flush 0.9 % injection 5-40 mL  5-40 mL IntraVENous PRN Keshia Eckert DPM        magnesium sulfate 1000 mg in dextrose 5% 100 mL IVPB  1,000 mg IntraVENous PRN Dorys Malik MD        vancomycin 1500 mg in dextrose 5% 300 mL IVPB  20 mg/kg IntraVENous Q24H Madison Arredondo  mL/hr at 04/18/22 1146 1,500 mg at 04/18/22 1146    potassium chloride 10 mEq/100 mL IVPB (Peripheral Line)  10 mEq IntraVENous PRN Home Mejia MD        magnesium sulfate 1000 mg in dextrose 5% 100 mL IVPB  1,000 mg IntraVENous PRN Home Mejia MD        sodium phosphate 10 mmol in dextrose 5 % 250 mL IVPB  10 mmol IntraVENous PRN Home Mejia MD        Or    sodium phosphate 15 mmol in dextrose 5 % 250 mL IVPB  15 mmol IntraVENous PRN Home Mejia MD        Or   Mahesh Little sodium phosphate 20 mmol in dextrose 5 % 500 mL IVPB  20 mmol IntraVENous PRN Maryla Libman, MD        dextrose 5 % and 0.45 % sodium chloride infusion   IntraVENous Continuous PRN Maryla Libman, MD        insulin regular (HUMULIN R;NOVOLIN R) 100 Units in sodium chloride 0.9 % 100 mL infusion  0.1 Units/kg/hr IntraVENous Continuous Maryla Libman, .1 mL/hr at 04/18/22 1308 3.95 Units/kg/hr at 04/18/22 1308    dextrose bolus (hypoglycemia) 10% 125 mL  125 mL IntraVENous PRN Maryla Libman, MD        Or    dextrose bolus (hypoglycemia) 10% 250 mL  250 mL IntraVENous PRN Maryla Libman, MD        pantoprazole (PROTONIX) injection 40 mg  40 mg IntraVENous Daily Rasta Sparks MD        sodium chloride flush 0.9 % injection 10 mL  10 mL IntraVENous PRN Medhat Valenzuela DPM           Allergies: Allergies   Allergen Reactions    Codeine     Pyridium [Phenazopyridine Hcl]        Problem List:    Patient Active Problem List   Diagnosis Code    [facility] C64.9    Vasovagal syncope R55    Type 2 diabetes mellitus without complication, without long-term current use of insulin (HCC) E11.9    Vitamin D deficiency E55.9    Mixed hyperlipidemia E78.2    Neuropathy G62.9    Light headedness R42    Neurologic gait dysfunction R26.9    Lumbar degenerative disc disease M51.36    Numbness and tingling R20.0, R20.2    Postural dizziness R42    Multiple lacunar infarcts (HCC) I63.81    DDD (degenerative disc disease), cervical M50.30    Bone marrow edema R93.7    Hyperglycemia due to diabetes mellitus (HCC) E11.65    Cerebral infarct (HCC) I63.9    Acute CVA (cerebrovascular accident) ([facility] I63.9    Necrotizing fasciitis ([facility] M72.6    Cellulitis in diabetic foot ([facility] E11.628, L03.119    Necrotizing cellulitis L03.90       Past Medical History:        Diagnosis Date    Bone marrow edema 03/25/2021    Abnl. C-spine MRI wo/mikel.  Patient refused MR C-spien with contrast, c/o out-of-pocket cost of other MRI's    DDD (degenerative disc disease), cervical 03/25/2021    Diabetes mellitus ([facility]     Hyperlipidemia     Lumbar degenerative disc disease 03/15/2021    Multiple lacunar infarcts ([facility] 03/25/2021    Numbness and tingling 03/15/2021    Postural dizziness 03/15/2021    UTI (urinary tract infection)        Past Surgical History:        Procedure Laterality Date    FOOT DEBRIDEMENT N/A 4/17/2022    right foot ulcer deep wound cultures  performed by My Baker DPM at [address] Left 09/27/2016       Social History:    Social History     Tobacco Use    Smoking status: Current Every Day Smoker     Packs/day: 1.00     Years: 30.00     Pack years: 30.00     Types: Cigarettes    Smokeless tobacco: Never Used   Substance Use Topics    Alcohol use: No                                Ready to quit: Not Answered  Counseling given: Not Answered      Vital Signs (Current):   Vitals:    04/18/22 1000 04/18/22 1100 04/18/22 1200 04/18/22 1300   BP: (!) 151/96 137/83 (!) 149/80 116/81   Pulse: 112 113 110 104   Resp: 21 17 30 19   Temp:   36.6 °C (97.8 °F)    TempSrc:   Temporal    SpO2: 95% 96% 95% 98%   Weight:       Height:                                                  BP Readings from Last 3 Encounters:   04/18/22 116/81   04/18/22 135/65   08/27/21 132/78       NPO Status: Time of last liquid consumption: 2100                        Time of last solid consumption: 2100                        Date of last liquid consumption: 04/17/22                        Date of last solid food consumption: 04/17/22    BMI:   Wt Readings from Last 3 Encounters:   04/18/22 173 lb (78.5 kg)   08/27/21 192 lb (87.1 kg)   08/06/21 190 lb (86.2 kg)     Body mass index is 30.65 kg/m².     CBC:   Lab Results   Component Value Date    WBC 60.2 04/18/2022    RBC 3.61 04/18/2022    HGB 11.0 04/18/2022    HCT 33.5 04/18/2022    MCV 92.8 04/18/2022    RDW 14.6 04/18/2022     04/18/2022       CMP:   Lab Results   Component Value Date     04/18/2022    K 5.6 04/18/2022    CL 95 04/18/2022    CO2 16 04/18/2022    BUN 46 04/18/2022    CREATININE 1.0 04/18/2022    GFRAA >60 04/18/2022    LABGLOM 56 04/18/2022    GLUCOSE 385 04/18/2022    PROT 9.2 04/17/2022    CALCIUM 8.9 04/18/2022    BILITOT 0.9 04/17/2022    ALKPHOS 200 04/17/2022    AST 12 04/17/2022    ALT 9 04/17/2022       POC Tests: No results for input(s): POCGLU, POCNA, POCK, POCCL, POCBUN, POCHEMO, POCHCT in the last 72 hours. Coags:   Lab Results   Component Value Date    PROTIME 11.3 07/31/2021    INR 1.0 07/31/2021    APTT 30.7 07/31/2021       HCG (If Applicable): No results found for: PREGTESTUR, PREGSERUM, HCG, HCGQUANT     ABGs: No results found for: PHART, PO2ART, UCL3WBL, KOA8DTX, BEART, K3HHDXQW     Type & Screen (If Applicable):  No results found for: LABABO, LABRH    Drug/Infectious Status (If Applicable):  No results found for: HIV, HEPCAB    COVID-19 Screening (If Applicable): No results found for: COVID19    EKG- [id number] Sinus tachycardia with premature supraventricular complexes  Abnormal ECG  When compared with ECG of 31-JUL-2021 18:28    [id number] \" Normal left ventricle size and systolic function. Ejection fraction is visually estimated at 55-60%. No regional wall motion abnormalities seen. Normal left ventricle wall thickness. Indeterminate diastolic function. No evidence of patent foramen ovale on bubble study. Normal right ventricular size and function. TAPSE 27 mm. No hemodynamically significant aortic stenosis is present. Unable to estimate PA systolic pressure. No evidence for hemodynamically significant pericardial effusion. \"        Anesthesia Evaluation  Patient summary reviewed and Nursing notes reviewed no history of anesthetic complications:   Airway: Mallampati: III  TM distance: <3 FB   Neck ROM: full  Mouth opening: < 3 FB Dental:    (+) upper dentures and lower dentures      Pulmonary:   (+) decreased breath sounds,  current smoker                           Cardiovascular:    (+) hyperlipidemia        Rhythm: regular  Rate: abnormal                    Neuro/Psych:   (+) CVA:, neuromuscular disease:,             GI/Hepatic/Renal:             Endo/Other:    (+) DiabetesType II DM, poorly controlled, using insulin, blood dyscrasia: anticoagulation therapy:., malignancy/cancer. Abdominal:             Vascular: negative vascular ROS. Other Findings:           Anesthesia Plan      general     ASA 4       Induction: intravenous. BIS  MIPS: Postoperative opioids intended and Prophylactic antiemetics administered. Plan discussed with CRNA and attending.     Attending anesthesiologist reviewed and agrees with Preprocedure content            Brent French RN   4/18/2022

## 2022-04-18 NOTE — PATIENT CARE CONFERENCE
Intensive Care Daily Quality Rounding Checklist      ICU Team Members: Dr Sofía Olea, residents, RL, bedside RN, RT    ICU Day #: 1    Intubation Date: NA    Ventilator Day #: NA    Central Line Insertion Date: NA        Day #: NA     Arterial Line Insertion Date: NA      Day #: NA    Temporary Hemodialysis Catheter Insertion Date: NA      Day #: NA    DVT Prophylaxis: plavix    GI Prophylaxis: needs addressed    Mathews Catheter Insertion Date: 4/17       Day #: 2      Continued need (if yes, reason documented and discussed with physician): Y    Skin Issues/ Wounds and ordered treatment discussed on rounds: Y     Goals/ Plans for the Day: Possible return to OR, insulin gtt for DKA, monitor labs and vitals, PICC line.

## 2022-04-18 NOTE — PROGRESS NOTES
Pharmacy Consultation Note  (Antibiotic Dosing and Monitoring)    Initial consult date: 4/18/2022  Consulting physician/provider: Dr. Ana Garcia  Drug: Vancomycin  Indication: Necrotizing fasciitis with gangrene of the right lower extremity      Age/  Gender Height Weight IBW  Allergy Information   61 y.o./female 5' 3\" (160 cm) 188 lb (85.3 kg)     Ideal body weight: 52.4 kg (115 lb 8.3 oz)  Adjusted ideal body weight: 62.8 kg (138 lb 8.2 oz)   Codeine and Pyridium [phenazopyridine hcl]      Renal Function:  Recent Labs     04/17/22  2302 04/18/22  0520 04/18/22  1111   BUN 49* 46* 48*   CREATININE 1.3* 1.0 1.2*       Intake/Output Summary (Last 24 hours) at 4/18/2022 1315  Last data filed at 4/18/2022 0500  Gross per 24 hour   Intake 1490 ml   Output 675 ml   Net 815 ml       Vancomycin Monitoring:  Trough:  No results for input(s): VANCOTROUGH in the last 72 hours. Random:  No results for input(s): VANCORANDOM in the last 72 hours. Vancomycin Administration Times:  Recent vancomycin administrations                   vancomycin 1500 mg in dextrose 5% 300 mL IVPB (mg) 1,500 mg New Bag 04/18/22 1146    vancomycin 1500 mg in dextrose 5% 300 mL IVPB (mg) 1,500 mg New Bag 04/18/22 0220                Assessment:  · Patient is a 57 y.o. female who has been initiated on vancomycin  · Estimated Creatinine Clearance: 48 mL/min (A) (based on SCr of 1.2 mg/dL (H)).   · To dose vancomycin, pharmacy will be utilizing [employer] calculation software for goal AUC/-600 mg/L-hr    Plan:  · Will continue vancomycin 1500 mg IV every 24 hours for estimated AUC/  · Will check vancomycin levels when appropriate  · Will continue to monitor renal function   · Clinical pharmacy to follow    James Wolfe PharmD, [facility]  4/18/2022  1:16 PM

## 2022-04-18 NOTE — ANESTHESIA PRE PROCEDURE
Department of Anesthesiology  Preprocedure Note       Name:  Cortes Denise   Age:  57 y.o.  :  1960                                          MRN:  [id number]         Date:  2022      Surgeon: Dr. Camille Tubbs    Procedure: Right foot I&D with possible bone biopsy    Medications prior to admission:   Prior to Admission medications    Medication Sig Start Date End Date Taking?  Authorizing Provider   atorvastatin (LIPITOR) 40 MG tablet Take 1 tablet by mouth nightly 21   Geoff King DO   clopidogrel (PLAVIX) 75 MG tablet Take 1 tablet by mouth daily 21   Geoff King DO   aspirin 81 MG EC tablet Take 81 mg by mouth daily    Historical Provider, MD   acetaminophen (TYLENOL) 325 MG tablet Take 650 mg by mouth every 6 hours as needed for Pain    Historical Provider, MD   vitamin D (ERGOCALCIFEROL) 1.25 MG (71098 UT) CAPS capsule Take 1 capsule by mouth once a week 21   Geoff King DO   glipiZIDE (GLUCOTROL) 5 MG tablet Take 1 tablet by mouth 2 times daily (before meals) 21   Geoff King DO       Current medications:    Current Facility-Administered Medications   Medication Dose Route Frequency Provider Last Rate Last Admin    vancomycin (VANCOCIN) 20 mg/kg in dextrose 5 % 250 mL IVPB  20 mg/kg IntraVENous Once Bridget Maple, DO        cefepime (MAXIPIME) 2000 mg IVPB minibag  2,000 mg IntraVENous Once Bridget Maple, DO        clindamycin (CLEOCIN) 900 mg in dextrose 5 % 50 mL IVPB  900 mg IntraVENous Once Bridget Maple, DO        0.9 % sodium chloride bolus  1,000 mL IntraVENous Once Bridget Maple, DO        sodium chloride flush 0.9 % injection 10 mL  10 mL IntraVENous PRN Bridget Maple, DO        0.9 % sodium chloride bolus  1,000 mL IntraVENous Once Bridget Maple, DO         Current Outpatient Medications   Medication Sig Dispense Refill    atorvastatin (LIPITOR) 40 MG tablet Take 1 tablet by mouth nightly 90 tablet 5    clopidogrel (PLAVIX) 75 MG tablet Take 1 tablet by mouth daily 90 tablet 5    aspirin 81 MG EC tablet Take 81 mg by mouth daily      acetaminophen (TYLENOL) 325 MG tablet Take 650 mg by mouth every 6 hours as needed for Pain      vitamin D (ERGOCALCIFEROL) 1.25 MG (39330 UT) CAPS capsule Take 1 capsule by mouth once a week 12 capsule 5    glipiZIDE (GLUCOTROL) 5 MG tablet Take 1 tablet by mouth 2 times daily (before meals) 180 tablet 5       Allergies: Allergies   Allergen Reactions    Codeine     Pyridium [Phenazopyridine Hcl]        Problem List:    Patient Active Problem List   Diagnosis Code    Cancer of kidney ([facility] C64.9    Vasovagal syncope R55    Type 2 diabetes mellitus without complication, without long-term current use of insulin ([facility]) E11.9    Vitamin D deficiency E55.9    Mixed hyperlipidemia E78.2    Neuropathy G62.9    Light headedness R42    Neurologic gait dysfunction R26.9    Lumbar degenerative disc disease M51.36    Numbness and tingling R20.0, R20.2    Postural dizziness R42    Multiple lacunar infarcts ([facility]) I63.81    DDD (degenerative disc disease), cervical M50.30    Bone marrow edema R93.7    Hyperglycemia due to diabetes mellitus ([facility] E11.65    Cerebral infarct ([facility]) I63.9    Acute CVA (cerebrovascular accident) ([facility] I63.9       Past Medical History:        Diagnosis Date    Bone marrow edema 03/25/2021    Abnl. C-spine MRI wo/mikel.  Patient refused MR C-spien with contrast, c/o out-of-pocket cost of other MRI's    DDD (degenerative disc disease), cervical 03/25/2021    Diabetes mellitus ([facility]     Hyperlipidemia     Lumbar degenerative disc disease 03/15/2021    Multiple lacunar infarcts ([facility] 03/25/2021    Numbness and tingling 03/15/2021    Postural dizziness 03/15/2021    UTI (urinary tract infection)        Past Surgical History:        Procedure Laterality Date    TOTAL NEPHRECTOMY Left 09/27/2016       Social History:    Social History     Tobacco Use    Smoking status: Current Every Day Smoker     Packs/day: 1.00     Years: 30.00     Pack years: 30.00     Types: Cigarettes    Smokeless tobacco: Never Used   Substance Use Topics    Alcohol use: No                                Ready to quit: Not Answered  Counseling given: Not Answered      Vital Signs (Current):   Vitals:    04/17/22 2203   BP: 126/80   Pulse: 110   Resp: 16   Temp: 98.7 °F (37.1 °C)   TempSrc: Oral   SpO2: 98%                                              BP Readings from Last 3 Encounters:   04/17/22 126/80   08/27/21 132/78   08/06/21 127/78       NPO Status:                                                                                 BMI:   Wt Readings from Last 3 Encounters:   08/27/21 192 lb (87.1 kg)   08/06/21 190 lb (86.2 kg)   08/01/21 199 lb 9.6 oz (90.5 kg)     There is no height or weight on file to calculate BMI.    CBC:   Lab Results   Component Value Date    WBC 10.5 08/04/2021    RBC 4.80 08/04/2021    HGB 14.3 08/04/2021    HCT 43.1 08/04/2021    MCV 89.8 08/04/2021    RDW 13.6 08/04/2021     08/04/2021       CMP:   Lab Results   Component Value Date     08/04/2021    K 4.1 08/04/2021    K 4.3 07/31/2021     08/04/2021    CO2 24 08/04/2021    BUN 27 08/04/2021    CREATININE 1.2 08/04/2021    GFRAA 55 08/04/2021    LABGLOM 45 08/04/2021    GLUCOSE 140 08/04/2021    PROT 7.5 07/31/2021    CALCIUM 9.6 08/04/2021    BILITOT 0.3 07/31/2021    ALKPHOS 133 07/31/2021    AST 10 07/31/2021    ALT 8 07/31/2021       POC Tests: No results for input(s): POCGLU, POCNA, POCK, POCCL, POCBUN, POCHEMO, POCHCT in the last 72 hours.     Coags:   Lab Results   Component Value Date    PROTIME 11.3 07/31/2021    INR 1.0 07/31/2021    APTT 30.7 07/31/2021       HCG (If Applicable): No results found for: PREGTESTUR, PREGSERUM, HCG, HCGQUANT     ABGs: No results found for: PHART, PO2ART, AIP9ESD, QIV4GRZ, BEART, W6YVWLEQ     Type & Screen (If Applicable):  No results found for: LABABO, [address]    Drug/Infectious Status (If Applicable):  No results found for: HIV, HEPCAB    COVID-19 Screening (If Applicable): No results found for: COVID19      Right foot XR:  Impression   Extensive soft tissue swelling and gas throughout the visualized right lower   extremity, concerning for gas forming infection and necrotizing fasciitis.              Anesthesia Evaluation  Patient summary reviewed and Nursing notes reviewed no history of anesthetic complications:   Airway: Mallampati: III  TM distance: >3 FB   Neck ROM: limited  Mouth opening: < 3 FB Dental:    (+) edentulous      Pulmonary:   (+) decreased breath sounds,  current smoker (30 pyhx.)                          PE comment: Tachypneic Cardiovascular:  Exercise tolerance: poor (<4 METS),   (+) murmur (Grade I), hyperlipidemia      ECG reviewed  Rhythm: regular  Rate: abnormal  Echocardiogram reviewed         Beta Blocker:  Not on Beta Blocker      ROS comment: EKG:  Normal sinus rhythm  Low voltage QRS  Abnormal ECG  When compared with ECG of 07-OCT-2020 11:28,  No significant change was found    ECHO:  Normal left ventricle size and systolic function. Ejection fraction is visually estimated at 55-60%. No regional wall motion abnormalities seen. Normal left ventricle wall thickness. Indeterminate diastolic function. No evidence of patent foramen ovale on bubble study. Normal right ventricular size and function. TAPSE 27 mm. No hemodynamically significant aortic stenosis is present. Unable to estimate PA systolic pressure. No evidence for hemodynamically significant pericardial effusion. Neuro/Psych:   (+) CVA (Multiple lacunar infarcts):, neuromuscular disease (Neuropathy):,              ROS comment: Vasovagal syncope  Neurologic gait dysfunction  Postural dizziness  Generalized weakness and malaise  Bed bound for three days - needed a walker prior. Very poor functional capacity.  GI/Hepatic/Renal: Neg GI/Hepatic/Renal ROS  (+) renal disease (Creatinine 1.3 & GFR 41): CRI and ARF,           Endo/Other:    (+) Diabetes ( mg/dL)Type II DM, poorly controlled, , blood dyscrasia (Plavix): anticoagulation therapy, arthritis (DDD): OA., electrolyte abnormalities (Hyponatremia (128 mmol/L), hyperkalemia (5.4 mmol/L)), malignancy/cancer (Renal). Pt had no PAT visit        ROS comment: Per ED note:  57-year-old female history of diabetes presents emergency department with right foot infection patient is also been apparently lying in bed for 3 days. Per family the wound was noted Monday but has not been looked at in several days and when they looked that today her foot was black and they are concerned and called ambulance. She is also been more confused and patient not been doing well at home. Labs concerning for DKA vs. HOC. Lactate elevated. Severe leukocytosis. Likely evolving sepsis. Medical noncompliance and neglect. Patient hasn't eaten or taken her medications for at least three days. Abdominal:             Vascular:   + PVD, aortic or cerebral, . ROS comment: Suspected right foot necrotizing fasciitis. Other Findings: Necrotic medial foot and ankle with odor          Anesthesia Plan      MAC     ASA 4 - emergent     (Patient at high risk for ashwini-operative morbidity and mortality  Patient will likely need an amputation once medically stable  STAT case given clinical presentation)  Induction: intravenous. MIPS: Postoperative opioids intended and Prophylactic antiemetics administered. Anesthetic plan and risks discussed with patient. Plan discussed with CRISPIN. Natasha Law DO   4/17/2022    DOS STAFF ADDENDUM:    Pt seen and examined. Chart reviewed including anesthesia, medication, and allergy history. H&P reviewed. Physical exam updated. No interval changes to history or physical examination (unless noted above). NPO status confirmed.     Anesthetic plan, risks, benefits, alternatives discussed with patient. Denies solid food or medications for at least 3 days. Had some Gatorade earlier this afternoon. Patient speaks slowly but is able to answer questions appropriately. Currently oriented to person, place, and time. Patient expressed her understanding and agreed to proceed as discussed. Verbal consent obtained.      Blanca Bolden DO  Staff Anesthesiologist  11:40 PM

## 2022-04-18 NOTE — CONSULTS
Department of Podiatry  Attending Consult Note      Reason for Consult: Necrotizing fasciitis right foot  Requesting Physician: dr Bianca Holloway: Malaise fever chills odor  discolored right foot. HISTORY OF PRESENT ILLNESS:                The patient is a 57 y.o. female with significant past medical history of diabetic ulcer right foot PVD. Who presents with infection to the right foot. This patient is seen in ER stat. Patient relates that she was brought by ambulance. Patient was stating that for the last 2 weeks she had an odor and an ulcer on the right foot and just never bothered to come into the office. Patient's family made her go to the ER tonight. Past Medical History:        Diagnosis Date    Bone marrow edema 03/25/2021    Abnl. C-spine MRI wo/mikel.  Patient refused MR C-spien with contrast, c/o out-of-pocket cost of other MRI's    DDD (degenerative disc disease), cervical 03/25/2021    Diabetes mellitus ([facility]     Hyperlipidemia     Lumbar degenerative disc disease 03/15/2021    Multiple lacunar infarcts (H[facility]) 03/25/2021    Numbness and tingling 03/15/2021    Postural dizziness 03/15/2021    UTI (urinary tract infection)      Past Surgical History:        Procedure Laterality Date    TOTAL NEPHRECTOMY Left 09/27/2016     Current Medications:    Current Facility-Administered Medications: vancomycin (VANCOCIN) 20 mg/kg in dextrose 5 % 250 mL IVPB, 20 mg/kg, IntraVENous, Once  cefepime (MAXIPIME) 2000 mg IVPB minibag, 2,000 mg, IntraVENous, Once  clindamycin (CLEOCIN) 900 mg in dextrose 5 % 50 mL IVPB, 900 mg, IntraVENous, Once  0.9 % sodium chloride bolus, 1,000 mL, IntraVENous, Once  sodium chloride flush 0.9 % injection 10 mL, 10 mL, IntraVENous, PRN  0.9 % sodium chloride bolus, 1,000 mL, IntraVENous, Once  Allergies:  Codeine and Pyridium [phenazopyridine hcl]    Social History:      Family History:     REVIEW OF SYSTEMS:      PHYSICAL EXAM:      Vitals:    BP 126/80   Pulse 110   Temp 98.7 °F (37.1 °C) (Oral)   Resp 16   SpO2 98%     SKIN: The right foot the entire medial aspect including the right hallux is black extending from the distal aspect of the right hallux to the medial malleolus. Foul odor is noted. Tissue is fluctuant with abscess noted. Extending the entire medial aspect of the right foot. NAILS:    NEUROLOGIC:    VASCULAR: Pulses +1/4 DP PT right side  MUSCULOSKELETAL:      Sharp dull sensation decreased plantar aspect right foot. Media Information             Document Information    Clinical Consent:  Wound      04/17/2022 22:04   Attached To: Hospital Encounter on 4/17/22     Source Information                                    IMPRESSION/RECOMMENDATIONS:    I had a long discussion today with the patient about the likely diagnosis and its natural history, physical exam and imaging findings, as well as treatment options. We discussed both surgical and nonsurgical treatments, including risks and benefits, we reviewed all aspects of the surgery including the risks the complications and the postop course there are no guarantees,   Patient agrees with surgery   we reviewed all risk and benefits all complications including anesthesia infection further surgery over under correction patient agrees to proceed with surgery. General the terms and procedures of the treatment were undertaken there was alternative procedures and methods discussed with the patient the patient declined these and opted for surgery. All the risks with the procedure were reviewed. Necrotizing fasciitis right foot cellulitis right foot diabetic foot ulcer right foot. Plan at this time I discussed with the patient due to the critical nature of this injury. It is imperative that we do surgery at this time this is a stat procedure to save patient right foot and life. Will be scheduled stat for an I&D of right foot with bone biopsy tissue biopsy deep biopsy.   She will be admitted and infectious disease will be consulted.

## 2022-04-18 NOTE — PROCEDURES
PROCEDURE  4/18/22       Time: 0842    CENTRAL LINE INSERTION  Risks, benefits and alternatives (for applicable procedures below) described. Performed By: Rasta Sparks MD.    Indication: poor peripheral access and need for frequent blood draws. Informed consent: Verbal consent obtained. The spouse was counseled regarding the procedure via phone (confirmed by third party present), it's indications, risks, potential complications and alternatives and any questions were answered. Verbal consent was obtained. Procedure: After routine sterile preparation, local anesthesia obtained by infiltration using 1% Lidocaine with epinephrine. A right 3-Lumen 7F Central Venous Catheter was placed by internal jugular vein approach and secured by standard fashion. Ultrasound Guidance:   used. Number of Attempts: 1  Post-procedure Findings: A post procedural chest x-ray  was ordered and is still pending at this time. Patient tolerated the procedure well. Attending Physician Attestation: Dr. Geovany Funk    Thank you very much for allowing me to see this patient in consultation and follow up. I personally saw, examined and provided care for the patient. Radiographs, labs and medication list were reviewed by me independently. I spoke with bedside nursing, respiratory therapists and consultants. Critical care services and times documented are independent of procedures and multidisciplinary rounds with Residents. Additionally comprehensive, multidisciplinary rounds were conducted with the MICU team. The case was discussed in detail and plans for care were established. Review of Residents documentation was conducted and revisions were made as appropriate. I agree with the the above documented information. I was personally present during the completion of procedure.     Geovany Funk  4/18/2022  8:57 AM

## 2022-04-18 NOTE — ANESTHESIA POSTPROCEDURE EVALUATION
Department of Anesthesiology  Postprocedure Note    Patient: Eddie Elias  MRN: [id number]  YOB: 1960  Date of evaluation: 4/18/2022  Time:  4:49 PM     Procedure Summary     Date: 04/18/22 Room / Location: [facility] / [facility]    Anesthesia Start: [facility] Anesthesia Stop: 5992    Procedure: GUILLOTINE RIGHT ABOVE KNEE AMPUTATION (Right Leg Upper) Diagnosis: (-)    Surgeons: Karishma Archibald MD Responsible Provider: Kamran Jose DO    Anesthesia Type: general ASA Status: 4          Anesthesia Type: general    Joy Phase I: Joy Score: 10    Joy Phase II: Joy Score: 10    Last vitals: Reviewed and per EMR flowsheets.        Anesthesia Post Evaluation    Patient location during evaluation: ICU  Patient participation: complete - patient participated  Level of consciousness: awake and alert  Pain score: 2  Airway patency: patent  Nausea & Vomiting: no nausea and no vomiting  Complications: no  Cardiovascular status: hemodynamically stable  Respiratory status: acceptable  Comments: V.S.S, further care per ICU team

## 2022-04-18 NOTE — OP NOTE
Operative Note      Patient: Darylene Sawyer  YOB: 1960  MRN: [id number]    Date of Procedure: 4/18/2022    Pre-Op Diagnosis: Gas gangrene right lower extremity-    Post-Op Diagnosis: Same       Procedure(s):  GUILLOTINE RIGHT ABOVE KNEE AMPUTATION    Surgeon(s):  Nita Mueller MD    Assistant:   Surgical Assistant: Camila Villatoro  Physician Assistant: Johnathan Yip PA-C    Anesthesia: General    Estimated Blood Loss (mL): less than 015     Complications: None    Specimens:   ID Type Source Tests Collected by Time Destination   A : right lower leg Specimen Leg SURGICAL PATHOLOGY Nita Mueller MD 4/18/2022 1606        Implants:  * No implants in log *      Drains:   Urethral Catheter Non-latex;Straight-tip (Active)   Catheter Indications Need for fluid volume management of the critically ill patient in a critical care setting 04/18/22 1200   Site Assessment No urethral drainage 04/18/22 1200   Urine Color Yellow 04/18/22 1200   Urine Appearance Sediment 04/18/22 1200   Output (mL) 150 mL 04/18/22 0500       [REMOVED] External Urinary Catheter (Removed)       Findings:     Detailed Description of Procedure: The patient was identified and the procedure was confirmed. The right leg was prepped and draped in the usual sterile fashion. A circumferential skin incision was made at the level of the knee and carried down through the subtendinous tissue. In the posterior compartment, there was necrotic foul-smelling tissue. Addendum: Infection was found in the deep space in the deep posterior compartment. The knee joint was disarticulated. The popliteal artery was identified and clamped and ligated with a silk suture. There was a cavity with necrotic muscle posteriorly but the cavity did not extend more than a few centimeters. The cavity was packed with Dakin's soaked Kerlix and a covered with a heavy drainage pack.   Gerard Garcia PA-C was scrubbed and assisted with the entire procedure. She assisted with opening of the incision, handling of tissues, ligation of the vessels, and disarticulation of the knee joint. The patient tolerated the procedure and was transferred back to the ICU in guarded condition.     Electronically signed by Kelli Ortega MD on 4/18/2022 at 4:28 PM

## 2022-04-18 NOTE — FLOWSHEET NOTE
Patient admitted from OR to 206, with the following belongings papetar, placed on monitor, patient oriented to room and unit visiting hours. Patient guide at bedside, reviewed patient rights and responsibilities. MRSA nasal swab obtained. Bed alarm on. Call light within reach.

## 2022-04-18 NOTE — CONSULTS
Vascular Surgery Inpatient Consultation Note      Reason for Consultation:  R foot wound s/p stat I&D w podiatry 4/17 for necrotizing fasciitis    HISTORY OF PRESENT ILLNESS:  *History somewhat limited 2/2 AMS and no family at bedside              The patient is a 57 y.o. female who is admitted to the hospital for treatment of R foot necrotizing fasciitis. Pt with worsening mental status this morning, felt to be at least partially attributed to DKA, and is not a very reliable historian. Per chart review, she developed a wound to her R foot at least 2 weeks ago. She follows with Dr Jana Cheung for podiatric care but did not see him for this wound. Her family called for EMS yesterday due to discoloration to her R foot. Pt apparently had been laying in bed for several days prior to seeking treatment. Pt was taken for stat OR debridement and cx last night, 4/17, and is currently on IV ABX. Vascular surgery is consulted for evaluation and treatment. IMPRESSION:  Necrotizing fasciitis of R foot, markedly elevated WBC count 60.2K    RECOMMENDATIONS:  Media reviewed. Pt will require guillotine amputation at the ankle to prevent further ascending infection. Discussed with podiatry resident. Will plan for today. NPO now. Plan discussed w Dr [place] [employer]    I spoke with the patient's wife Isidoro Crew [phone] and reviewed with her that I will perform the amputation at the level feel that we can clear all the infection. Unfortunately this will likely be above the knee. She understands and states to \"do what ever needs done\".     Patient Active Problem List   Diagnosis Code    Cancer of kidney ([facility] [address] C64.9    Vasovagal syncope R55    Type 2 diabetes mellitus without complication, without long-term current use of insulin (HCC) E11.9    Vitamin D deficiency E55.9    Mixed hyperlipidemia E78.2    Neuropathy G62.9    Light headedness R42    Neurologic gait dysfunction R26.9    Lumbar degenerative disc disease M51.36    Numbness and tingling R20.0, R20.2    Postural dizziness R42    Multiple lacunar infarcts (HCC) I63.81    DDD (degenerative disc disease), cervical M50.30    Bone marrow edema R93.7    Hyperglycemia due to diabetes mellitus (HCC) E11.65    Cerebral infarct (HCC) I63.9    Acute CVA (cerebrovascular accident) ([facility] [address] I63.9    Necrotizing fasciitis ([facility] [address] M72.6    Cellulitis in diabetic foot ([facility] [address] E11.628, L03.119    Necrotizing cellulitis L03.90       Past Medical History:   Diagnosis Date    Bone marrow edema 03/25/2021    Abnl. C-spine MRI wo/mikel.  Patient refused MR C-spien with contrast, c/o out-of-pocket cost of other MRI's    DDD (degenerative disc disease), cervical 03/25/2021    Diabetes mellitus ([facility] [address]     Hyperlipidemia     Lumbar degenerative disc disease 03/15/2021    Multiple lacunar infarcts (HCC) 03/25/2021    Numbness and tingling 03/15/2021    Postural dizziness 03/15/2021    UTI (urinary tract infection)         Past Surgical History:   Procedure Laterality Date    FOOT DEBRIDEMENT N/A 4/17/2022    right foot ulcer deep wound cultures  performed by Otoniel Chris DPM at [address] [facility] Left 09/27/2016       Current Medications:    dextrose      sodium chloride 100 mL/hr at 04/18/22 0218    sodium chloride      sodium chloride      sodium chloride      sodium chloride      dextrose 5 % and 0.45 % NaCl        glucose, dextrose, glucagon (rDNA), dextrose, sodium chloride flush, sodium chloride, polyethylene glycol, acetaminophen **OR** acetaminophen, sodium chloride, sodium chloride flush, potassium chloride, magnesium sulfate, dextrose 5 % and 0.45 % NaCl, sodium chloride flush    atorvastatin  40 mg Oral Nightly    aspirin  81 mg Oral Daily    sodium chloride flush  10 mL IntraVENous 2 times per day    insulin glargine  15 Units SubCUTAneous Nightly    insulin lispro  0.08 Units/kg SubCUTAneous TID     insulin lispro  0-12 Units SubCUTAneous TID     insulin lispro  0-6 Units SubCUTAneous Nightly    vitamin D  50,000 Units Oral Weekly    clopidogrel  75 mg Oral Daily    piperacillin-tazobactam  3,375 mg IntraVENous Q8H    sodium chloride flush  5-40 mL IntraVENous 2 times per day    vancomycin  20 mg/kg IntraVENous Q24H        Allergies:  Codeine and Pyridium [phenazopyridine hcl]    Social History     Socioeconomic History    Marital status:      Spouse name: Not on file    Number of children: Not on file    Years of education: Not on file    Highest education level: Not on file   Occupational History    Not on file   Tobacco Use    Smoking status: Current Every Day Smoker     Packs/day: 1.00     Years: 30.00     Pack years: 30.00     Types: Cigarettes    Smokeless tobacco: Never Used   Substance and Sexual Activity    Alcohol use: No    Drug use: No    Sexual activity: Yes     Partners: Female   Other Topics Concern    Not on file   Social History Narrative        DIABETES--DX 1-09    SMOKER    WEIGHT    OA    CHRONIC LOW BACK PAIN    BULGE LUMBAR DISC----WC WITH DR GARY    ELEV TSH IN PAST    LIPID    AFTERNOON SHIFT [employer] PRODUCTS    SEVERE NON COMPLIANCE    OBESITY    BRO  2013 AGE 47 MI--SMOKER---OBESE    ELEV WBC 12-14    ELEV CREA 12-14    PROTEINURIA    UTI 1-16    [employer] CO    ER WITH URINE RETENTION -16-----FLEY--UROL    TOTAL L;EFT NEPHRECTOMY ---RENAL CELL CARCINOMA-----  DR OLIVA----TWO    MASS LEFT KIDNEY----CYST ON RIGHT KIDNEY    Boss  [place]    Unsteady gait, multifocused CVA, seeing Dr. Marquis Burton.   Surgical consult     Mri    brain  3-21 iwht  radha of old  cva----referred dr Izabel Garcia   with dr schreiber with  two diff neuroapthy---    Cva  speech left sided weakness        Social Determinants of Health     Financial Resource Strain:     Difficulty of Paying Living Expenses: Not on file   Food Insecurity:     Worried About Running Out of Food in the Last Year: Not on file  Ran Out of Food in the Last Year: Not on file   Transportation Needs:     Lack of Transportation (Medical): Not on file    Lack of Transportation (Non-Medical): Not on file   Physical Activity:     Days of Exercise per Week: Not on file    Minutes of Exercise per Session: Not on file   Stress:     Feeling of Stress : Not on file   Social Connections:     Frequency of Communication with Friends and Family: Not on file    Frequency of Social Gatherings with Friends and Family: Not on file    Attends Sabianism Services: Not on file    Active Member of [address] [place] [address] [employer] or Organizations: Not on file    Attends Club or Organization Meetings: Not on file    Marital Status: Not on file   Intimate Partner Violence:     Fear of Current or Ex-Partner: Not on file    Emotionally Abused: Not on file    Physically Abused: Not on file    Sexually Abused: Not on file   Housing Stability:     Unable to Pay for Housing in the Last Year: Not on file    Number of [place] in the Last Year: Not on file    Unstable Housing in the Last Year: Not on file        No family history on file.     REVIEW OF SYSTEMS:      Eyes:      Blurred vision:  No [x]/Yes []               Diplopia:   No [x]/Yes []               Vision loss:       No [x]/Yes []   Ears, nose, throat:             Hearing loss:    No [x]/Yes []      Vertigo:   No [x]/Yes []                       Swallowing problem:  No [x]/Yes []               Nose bleeds:   No [x]/Yes []      Voice hoarseness:  No [x]/Yes []  Respiratory:             Cough:   No [x]/Yes []      Pleuritic chest pain:  No [x]/Yes []                        Dyspnea:   No [x]/Yes []      Wheezing:   No [x]/Yes []  Cardiovascular:             Angina:   No [x]/Yes []      Palpitations:   No [x]/Yes []          Claudication:    No [x]/Yes []      Leg swelling:   No [x]/Yes []  Gastrointestinal:             Nausea or vomiting:  No [x]/Yes []               Abdominal pain:  No [x]/Yes [] Intestinal bleeding: No [x]/Yes []  Musculoskeletal:             Leg pain:   No [x]/Yes []      Back pain:   No [x]/Yes []                    Weakness:   No [x]/Yes []  Neurologic:             Numbness:   No [x]/Yes []      Paralysis:   No [x]/Yes []                       Headaches:   No [x]/Yes []  Hematologic, lymphatic:   Anemia:   No [x]/Yes []              Bleeding or bruising:  No [x]/Yes []              Fevers or chills: No [x]/Yes []  Endocrine:             Temp intolerance:   No [x]/Yes []                       Polydipsia, polyuria:  No [x]/Yes []  Skin:              Rash:    No [x]/Yes []      Ulcers:   No [x]/Yes [x]              Abnorm pigment: No [x]/Yes []  :              Frequency/urgency:  No [x]/Yes []      Hematuria:    No [x]/Yes []                      Incontinence:    No [x]/Yes []    PHYSICAL EXAM:  Vitals:    04/18/22 0600   BP: (!) 165/70   Pulse: 108   Resp: 21   Temp:    SpO2: 97%     General Appearance: lethargic, ill-appearing, not cooperating well for exam  Neurologic: no cranial nerve deficit, speech normal  Head: normocephalic and atraumatic  Eyes: extraocular eye movements intact, conjunctivae normal  ENT: external ear and ear canal normal bilaterally, nose without deformity  Pulmonary/Chest: normal air movement, no respiratory distress  Cardiovascular: normal rate, regular rhythm  Abdomen: non-distended, no masses  Musculoskeletal: no joint deformity or tenderness  Extremities: R foot wrapped. Media review. No proximal streaking from the foot.   Skin: warm and dry, no rash or erythema    PULSE EXAM      Right      Left   Brachial     Radial     Femoral 2    Popliteal multiphasic    Dorsalis Pedis     Posterior Tibial     (3=normal, 2=diminished, 1=barely palpable, 4=widened)      LABS:    Lab Results   Component Value Date    WBC 60.2 (H) 04/18/2022    HGB 11.0 (L) 04/18/2022    HCT 33.5 (L) 04/18/2022     04/18/2022    PROTIME 11.3 07/31/2021    INR 1.0 07/31/2021    K 5.6 (H) 04/18/2022    BUN 46 (H) 04/18/2022    CREATININE 1.0 04/18/2022       RADIOLOGY:

## 2022-04-19 ENCOUNTER — APPOINTMENT (OUTPATIENT)
Dept: CT IMAGING | Age: 62
DRG: 710 | End: 2022-04-19
Payer: COMMERCIAL

## 2022-04-19 LAB
ANION GAP SERPL CALCULATED.3IONS-SCNC: 10 MMOL/L (ref 7–16)
ANION GAP SERPL CALCULATED.3IONS-SCNC: 10 MMOL/L (ref 7–16)
ANION GAP SERPL CALCULATED.3IONS-SCNC: 11 MMOL/L (ref 7–16)
B.E.: -8.5 MMOL/L (ref -3–3)
BASOPHILS ABSOLUTE: 0.03 E9/L (ref 0–0.2)
BASOPHILS RELATIVE PERCENT: 0.1 % (ref 0–2)
BLOOD BANK DISPENSE STATUS: NORMAL
BLOOD BANK PRODUCT CODE: NORMAL
BPU ID: NORMAL
BUN BLDV-MCNC: 32 MG/DL (ref 6–23)
BUN BLDV-MCNC: 34 MG/DL (ref 6–23)
BUN BLDV-MCNC: 36 MG/DL (ref 6–23)
CALCIUM SERPL-MCNC: 7.6 MG/DL (ref 8.6–10.2)
CALCIUM SERPL-MCNC: 7.7 MG/DL (ref 8.6–10.2)
CALCIUM SERPL-MCNC: 8 MG/DL (ref 8.6–10.2)
CHLORIDE BLD-SCNC: 100 MMOL/L (ref 98–107)
CHLORIDE BLD-SCNC: 100 MMOL/L (ref 98–107)
CHLORIDE BLD-SCNC: 101 MMOL/L (ref 98–107)
CO2: 17 MMOL/L (ref 22–29)
CO2: 18 MMOL/L (ref 22–29)
CO2: 18 MMOL/L (ref 22–29)
COHB: 1.4 % (ref 0–1.5)
CREAT SERPL-MCNC: 1 MG/DL (ref 0.5–1)
CREAT SERPL-MCNC: 1.1 MG/DL (ref 0.5–1)
CREAT SERPL-MCNC: 1.1 MG/DL (ref 0.5–1)
CRITICAL: ABNORMAL
DATE ANALYZED: ABNORMAL
DATE OF COLLECTION: ABNORMAL
DESCRIPTION BLOOD BANK: NORMAL
EOSINOPHILS ABSOLUTE: 0 E9/L (ref 0.05–0.5)
EOSINOPHILS RELATIVE PERCENT: 0 % (ref 0–6)
GFR AFRICAN AMERICAN: >60
GFR NON-AFRICAN AMERICAN: 50 ML/MIN/1.73
GFR NON-AFRICAN AMERICAN: 50 ML/MIN/1.73
GFR NON-AFRICAN AMERICAN: 56 ML/MIN/1.73
GLUCOSE BLD-MCNC: 123 MG/DL (ref 74–99)
GLUCOSE BLD-MCNC: 146 MG/DL (ref 74–99)
GLUCOSE BLD-MCNC: 160 MG/DL (ref 74–99)
GRAM STAIN ORDERABLE: NORMAL
HCO3: 15.4 MMOL/L (ref 22–26)
HCT VFR BLD CALC: 21.5 % (ref 34–48)
HCT VFR BLD CALC: 23.8 % (ref 34–48)
HCT VFR BLD CALC: 24.5 % (ref 34–48)
HEMOGLOBIN: 7.1 G/DL (ref 11.5–15.5)
HEMOGLOBIN: 7.7 G/DL (ref 11.5–15.5)
HEMOGLOBIN: 8.2 G/DL (ref 11.5–15.5)
HHB: 2.8 % (ref 0–5)
IMMATURE GRANULOCYTES #: 0.19 E9/L
IMMATURE GRANULOCYTES %: 0.7 % (ref 0–5)
LAB: ABNORMAL
LACTIC ACID: 1.6 MMOL/L (ref 0.5–2.2)
LACTIC ACID: 2 MMOL/L (ref 0.5–2.2)
LACTIC ACID: 2.3 MMOL/L (ref 0.5–2.2)
LYMPHOCYTES ABSOLUTE: 1.75 E9/L (ref 1.5–4)
LYMPHOCYTES RELATIVE PERCENT: 6.4 % (ref 20–42)
Lab: ABNORMAL
MAGNESIUM: 1.6 MG/DL (ref 1.6–2.6)
MAGNESIUM: 2.1 MG/DL (ref 1.6–2.6)
MAGNESIUM: 2.3 MG/DL (ref 1.6–2.6)
MCH RBC QN AUTO: 30.2 PG (ref 26–35)
MCHC RBC AUTO-ENTMCNC: 32.4 % (ref 32–34.5)
MCV RBC AUTO: 93.3 FL (ref 80–99.9)
METER GLUCOSE: 131 MG/DL (ref 74–99)
METER GLUCOSE: 154 MG/DL (ref 74–99)
METER GLUCOSE: 170 MG/DL (ref 74–99)
METER GLUCOSE: 174 MG/DL (ref 74–99)
METER GLUCOSE: 178 MG/DL (ref 74–99)
METER GLUCOSE: 179 MG/DL (ref 74–99)
METER GLUCOSE: 180 MG/DL (ref 74–99)
METER GLUCOSE: 191 MG/DL (ref 74–99)
METER GLUCOSE: 205 MG/DL (ref 74–99)
METER GLUCOSE: 277 MG/DL (ref 74–99)
METER GLUCOSE: 306 MG/DL (ref 74–99)
METER GLUCOSE: 308 MG/DL (ref 74–99)
METHB: 0.3 % (ref 0–1.5)
MODE: ABNORMAL
MONOCYTES ABSOLUTE: 1.29 E9/L (ref 0.1–0.95)
MONOCYTES RELATIVE PERCENT: 4.7 % (ref 2–12)
NEUTROPHILS ABSOLUTE: 23.93 E9/L (ref 1.8–7.3)
NEUTROPHILS RELATIVE PERCENT: 88.1 % (ref 43–80)
O2 CONTENT: 10.8 ML/DL
O2 SATURATION: 97.2 % (ref 92–98.5)
O2HB: 95.5 % (ref 94–97)
OPERATOR ID: 46
ORGANISM: ABNORMAL
PATIENT TEMP: 37 C
PCO2: 26.1 MMHG (ref 35–45)
PDW BLD-RTO: 14.6 FL (ref 11.5–15)
PH BLOOD GAS: 7.39 (ref 7.35–7.45)
PHOSPHORUS: 2.6 MG/DL (ref 2.5–4.5)
PHOSPHORUS: 3.1 MG/DL (ref 2.5–4.5)
PHOSPHORUS: 3.2 MG/DL (ref 2.5–4.5)
PLATELET # BLD: 210 E9/L (ref 130–450)
PMV BLD AUTO: 10.4 FL (ref 7–12)
PO2: 95 MMHG (ref 75–100)
POTASSIUM SERPL-SCNC: 4.1 MMOL/L (ref 3.5–5)
POTASSIUM SERPL-SCNC: 4.5 MMOL/L (ref 3.5–5)
POTASSIUM SERPL-SCNC: 4.5 MMOL/L (ref 3.5–5)
RBC # BLD: 2.55 E12/L (ref 3.5–5.5)
SODIUM BLD-SCNC: 128 MMOL/L (ref 132–146)
SODIUM BLD-SCNC: 128 MMOL/L (ref 132–146)
SODIUM BLD-SCNC: 129 MMOL/L (ref 132–146)
SOURCE, BLOOD GAS: ABNORMAL
THB: 7.9 G/DL (ref 11.5–16.5)
TIME ANALYZED: 835
VANCOMYCIN RANDOM: 22.3 MCG/ML (ref 5–40)
WBC # BLD: 27.2 E9/L (ref 4.5–11.5)

## 2022-04-19 PROCEDURE — 80202 ASSAY OF VANCOMYCIN: CPT

## 2022-04-19 PROCEDURE — 2580000003 HC RX 258: Performed by: PHYSICIAN ASSISTANT

## 2022-04-19 PROCEDURE — 80048 BASIC METABOLIC PNL TOTAL CA: CPT

## 2022-04-19 PROCEDURE — 36592 COLLECT BLOOD FROM PICC: CPT

## 2022-04-19 PROCEDURE — C9113 INJ PANTOPRAZOLE SODIUM, VIA: HCPCS | Performed by: PHYSICIAN ASSISTANT

## 2022-04-19 PROCEDURE — 85018 HEMOGLOBIN: CPT

## 2022-04-19 PROCEDURE — 2500000003 HC RX 250 WO HCPCS: Performed by: STUDENT IN AN ORGANIZED HEALTH CARE EDUCATION/TRAINING PROGRAM

## 2022-04-19 PROCEDURE — 6360000002 HC RX W HCPCS: Performed by: PHYSICIAN ASSISTANT

## 2022-04-19 PROCEDURE — 36415 COLL VENOUS BLD VENIPUNCTURE: CPT

## 2022-04-19 PROCEDURE — 36430 TRANSFUSION BLD/BLD COMPNT: CPT

## 2022-04-19 PROCEDURE — 82805 BLOOD GASES W/O2 SATURATION: CPT

## 2022-04-19 PROCEDURE — 84100 ASSAY OF PHOSPHORUS: CPT

## 2022-04-19 PROCEDURE — 83735 ASSAY OF MAGNESIUM: CPT

## 2022-04-19 PROCEDURE — 2000000000 HC ICU R&B

## 2022-04-19 PROCEDURE — 6370000000 HC RX 637 (ALT 250 FOR IP): Performed by: INTERNAL MEDICINE

## 2022-04-19 PROCEDURE — 2500000003 HC RX 250 WO HCPCS: Performed by: PHYSICIAN ASSISTANT

## 2022-04-19 PROCEDURE — 70450 CT HEAD/BRAIN W/O DYE: CPT

## 2022-04-19 PROCEDURE — 83605 ASSAY OF LACTIC ACID: CPT

## 2022-04-19 PROCEDURE — 85014 HEMATOCRIT: CPT

## 2022-04-19 PROCEDURE — 85025 COMPLETE CBC W/AUTO DIFF WBC: CPT

## 2022-04-19 PROCEDURE — 99233 SBSQ HOSP IP/OBS HIGH 50: CPT | Performed by: INTERNAL MEDICINE

## 2022-04-19 PROCEDURE — 2580000003 HC RX 258: Performed by: INTERNAL MEDICINE

## 2022-04-19 PROCEDURE — 6370000000 HC RX 637 (ALT 250 FOR IP): Performed by: PHYSICIAN ASSISTANT

## 2022-04-19 PROCEDURE — 82962 GLUCOSE BLOOD TEST: CPT

## 2022-04-19 PROCEDURE — 99232 SBSQ HOSP IP/OBS MODERATE 35: CPT | Performed by: INTERNAL MEDICINE

## 2022-04-19 RX ORDER — INSULIN GLARGINE 100 [IU]/ML
30 INJECTION, SOLUTION SUBCUTANEOUS DAILY
Status: DISCONTINUED | OUTPATIENT
Start: 2022-04-19 | End: 2022-04-20

## 2022-04-19 RX ORDER — DEXTROSE AND SODIUM CHLORIDE 5; .9 G/100ML; G/100ML
INJECTION, SOLUTION INTRAVENOUS CONTINUOUS
Status: DISCONTINUED | OUTPATIENT
Start: 2022-04-19 | End: 2022-04-20 | Stop reason: HOSPADM

## 2022-04-19 RX ORDER — SODIUM CHLORIDE 9 MG/ML
INJECTION, SOLUTION INTRAVENOUS PRN
Status: DISCONTINUED | OUTPATIENT
Start: 2022-04-19 | End: 2022-04-20 | Stop reason: HOSPADM

## 2022-04-19 RX ADMIN — SODIUM CHLORIDE, PRESERVATIVE FREE 10 ML: 5 INJECTION INTRAVENOUS at 21:05

## 2022-04-19 RX ADMIN — SODIUM BICARBONATE 150 MEQ: 84 INJECTION, SOLUTION INTRAVENOUS at 11:57

## 2022-04-19 RX ADMIN — SODIUM CHLORIDE 6.55 UNITS/HR: 9 INJECTION, SOLUTION INTRAVENOUS at 05:30

## 2022-04-19 RX ADMIN — SODIUM CHLORIDE, PRESERVATIVE FREE 10 ML: 5 INJECTION INTRAVENOUS at 08:36

## 2022-04-19 RX ADMIN — Medication: at 08:36

## 2022-04-19 RX ADMIN — SODIUM CHLORIDE 25 ML: 9 INJECTION, SOLUTION INTRAVENOUS at 15:16

## 2022-04-19 RX ADMIN — PIPERACILLIN AND TAZOBACTAM 3375 MG: 3; .375 INJECTION, POWDER, LYOPHILIZED, FOR SOLUTION INTRAVENOUS at 03:28

## 2022-04-19 RX ADMIN — VANCOMYCIN HYDROCHLORIDE 1500 MG: 10 INJECTION, POWDER, LYOPHILIZED, FOR SOLUTION INTRAVENOUS at 11:56

## 2022-04-19 RX ADMIN — INSULIN GLARGINE 30 UNITS: 100 INJECTION, SOLUTION SUBCUTANEOUS at 11:58

## 2022-04-19 RX ADMIN — DEXTROSE AND SODIUM CHLORIDE: 5; 900 INJECTION, SOLUTION INTRAVENOUS at 22:00

## 2022-04-19 RX ADMIN — MAGNESIUM SULFATE HEPTAHYDRATE 1000 MG: 1 INJECTION, SOLUTION INTRAVENOUS at 03:51

## 2022-04-19 RX ADMIN — DEXTROSE AND SODIUM CHLORIDE: 5; 900 INJECTION, SOLUTION INTRAVENOUS at 11:54

## 2022-04-19 RX ADMIN — PANTOPRAZOLE SODIUM 40 MG: 40 INJECTION, POWDER, FOR SOLUTION INTRAVENOUS at 08:37

## 2022-04-19 RX ADMIN — SODIUM CHLORIDE 25 ML: 9 INJECTION, SOLUTION INTRAVENOUS at 00:10

## 2022-04-19 RX ADMIN — SODIUM CHLORIDE 25 ML: 9 INJECTION, SOLUTION INTRAVENOUS at 23:00

## 2022-04-19 RX ADMIN — POTASSIUM CHLORIDE 10 MEQ: 7.46 INJECTION, SOLUTION INTRAVENOUS at 02:03

## 2022-04-19 RX ADMIN — MAGNESIUM SULFATE HEPTAHYDRATE 1000 MG: 1 INJECTION, SOLUTION INTRAVENOUS at 04:42

## 2022-04-19 RX ADMIN — SODIUM CHLORIDE 25 ML: 9 INJECTION, SOLUTION INTRAVENOUS at 07:00

## 2022-04-19 RX ADMIN — INSULIN LISPRO 6 UNITS: 100 INJECTION, SOLUTION INTRAVENOUS; SUBCUTANEOUS at 22:07

## 2022-04-19 RX ADMIN — DEXTROSE AND SODIUM CHLORIDE: 5; 450 INJECTION, SOLUTION INTRAVENOUS at 05:28

## 2022-04-19 RX ADMIN — HYDROMORPHONE HYDROCHLORIDE 0.5 MG: 1 INJECTION, SOLUTION INTRAMUSCULAR; INTRAVENOUS; SUBCUTANEOUS at 05:05

## 2022-04-19 RX ADMIN — INSULIN LISPRO 8 UNITS: 100 INJECTION, SOLUTION INTRAVENOUS; SUBCUTANEOUS at 18:22

## 2022-04-19 RX ADMIN — PIPERACILLIN AND TAZOBACTAM 3375 MG: 3; .375 INJECTION, POWDER, LYOPHILIZED, FOR SOLUTION INTRAVENOUS at 18:40

## 2022-04-19 RX ADMIN — PIPERACILLIN AND TAZOBACTAM 3375 MG: 3; .375 INJECTION, POWDER, LYOPHILIZED, FOR SOLUTION INTRAVENOUS at 11:02

## 2022-04-19 RX ADMIN — SODIUM PHOSPHATE, MONOBASIC, MONOHYDRATE 10 MMOL: 276; 142 INJECTION, SOLUTION INTRAVENOUS at 04:24

## 2022-04-19 RX ADMIN — POTASSIUM CHLORIDE 10 MEQ: 7.46 INJECTION, SOLUTION INTRAVENOUS at 04:52

## 2022-04-19 RX ADMIN — POTASSIUM CHLORIDE 10 MEQ: 7.46 INJECTION, SOLUTION INTRAVENOUS at 00:50

## 2022-04-19 RX ADMIN — POTASSIUM CHLORIDE 10 MEQ: 7.46 INJECTION, SOLUTION INTRAVENOUS at 03:46

## 2022-04-19 ASSESSMENT — PAIN DESCRIPTION - ORIENTATION
ORIENTATION: RIGHT
ORIENTATION: RIGHT

## 2022-04-19 ASSESSMENT — PAIN SCALES - GENERAL
PAINLEVEL_OUTOF10: 7
PAINLEVEL_OUTOF10: 0

## 2022-04-19 ASSESSMENT — PAIN DESCRIPTION - LOCATION
LOCATION: FOOT
LOCATION: FOOT

## 2022-04-19 ASSESSMENT — PAIN DESCRIPTION - PAIN TYPE
TYPE: SURGICAL PAIN
TYPE: SURGICAL PAIN

## 2022-04-19 NOTE — PROGRESS NOTES
Pharmacy Consultation Note  (Antibiotic Dosing and Monitoring)    Initial consult date: 4/18/2022  Consulting physician/provider: Dr. Lawson Enrique  Drug: Vancomycin  Indication: Necrotizing fasciitis with gangrene of the right lower extremity      Age/  Gender Height Weight IBW  Allergy Information   61 y.o./female 5' 3\" (160 cm) 188 lb (85.3 kg)     Ideal body weight: 52.4 kg (115 lb 8.3 oz)  Adjusted ideal body weight: 61.7 kg (135 lb 15.9 oz)   Codeine and Pyridium [phenazopyridine hcl]      Renal Function:  Recent Labs     04/19/22  0310 04/19/22  0645 04/19/22  1100   BUN 36* 34* 32*   CREATININE 1.1* 1.0 1.1*       Intake/Output Summary (Last 24 hours) at 4/19/2022 1158  Last data filed at 4/19/2022 1152  Gross per 24 hour   Intake 7216.61 ml   Output 1085 ml   Net 6131.61 ml       Vancomycin Monitoring:  Trough:  No results for input(s): VANCOTROUGH in the last 72 hours. Random:    Recent Labs     04/19/22  0500   VANCORANDOM 22.3       Vancomycin Administration Times:  Recent vancomycin administrations                   vancomycin 1500 mg in dextrose 5% 300 mL IVPB (mg) 1,500 mg New Bag 04/19/22 1156     1,500 mg New Bag 04/18/22 1146    vancomycin 1500 mg in dextrose 5% 300 mL IVPB (mg) 1,500 mg New Bag 04/18/22 0220                Assessment:  · Patient is a 57 y.o. female who has been initiated on vancomycin  Estimated Creatinine Clearance: 52 mL/min (A) (based on SCr of 1.1 mg/dL (H)).   · To dose vancomycin, pharmacy will be utilizing [employer] calculation software for goal AUC/-600 mg/L-hr    Plan:  · Will continue vancomycin 1500 mg IV every 24 hours2  · Will check vancomycin levels when appropriate  · Will continue to monitor renal function   · Clinical pharmacy to follow    Geovanna Rodriguez PharmD, BCCCP  4/19/2022  11:58 AM

## 2022-04-19 NOTE — PROGRESS NOTES
Podiatry Progress Note  4/19/2022   Shashi Powell       SUBJECTIVE: Shashi Powell is a 57 y.o. uncontrolled diabetic female s/p right foot I&D, debridement, biopsy (DOS 4/17/2022). S/p R guillotine AKA with Dr. Herndon Para 4/18/22. Contralateral limb stable. Podiatry to sign off at this time      Past Medical History:   Diagnosis Date    Bone marrow edema 03/25/2021    Abnl. C-spine MRI wo/mikel. Patient refused MR C-spien with contrast, c/o out-of-pocket cost of other MRI's    DDD (degenerative disc disease), cervical 03/25/2021    Diabetes mellitus ([facility] [address]     Hyperlipidemia     Lumbar degenerative disc disease 03/15/2021    Multiple lacunar infarcts ([facility] [address] 03/25/2021    Numbness and tingling 03/15/2021    Postural dizziness 03/15/2021    UTI (urinary tract infection)         Past Surgical History:   Procedure Laterality Date    FOOT DEBRIDEMENT N/A 4/17/2022    right foot ulcer deep wound cultures  performed by Felton Combs DPM at [address] [facility] Right 4/18/2022    SHARATH RIGHT ABOVE KNEE AMPUTATION performed by Geovanna Stafford MD at [address] Left 09/27/2016         No family history on file. Social History     Tobacco Use    Smoking status: Current Every Day Smoker     Packs/day: 1.00     Years: 30.00     Pack years: 30.00     Types: Cigarettes    Smokeless tobacco: Never Used   Substance Use Topics    Alcohol use: No        Prior to Admission medications    Medication Sig Start Date End Date Taking?  Authorizing Provider   atorvastatin (LIPITOR) 40 MG tablet Take 1 tablet by mouth nightly 8/27/21   Geoff King DO   clopidogrel (PLAVIX) 75 MG tablet Take 1 tablet by mouth daily 8/6/21   Geoff King DO   aspirin 81 MG EC tablet Take 81 mg by mouth daily    Historical Provider, MD   acetaminophen (TYLENOL) 325 MG tablet Take 650 mg by mouth every 6 hours as needed for Pain    Historical Provider, MD   vitamin D (ERGOCALCIFEROL) 1.25 MG (04722 UT) CAPS capsule Take 1 capsule by mouth once a week 2/25/21   Geoff King DO   glipiZIDE (GLUCOTROL) 5 MG tablet Take 1 tablet by mouth 2 times daily (before meals) 2/25/21   Geoff King DO        Codeine and Pyridium [phenazopyridine hcl]         OBJECTIVE:        Vitals:    04/19/22 1000   BP: (!) 91/56   Pulse: 72   Resp: 16   Temp:    SpO2: 97%        EXAM:        Pt is AAOx3. S/p R AKA. Resting in bed. LLE stable with no open wounds or lesions. Previous physical exam below from 4/18/22:     Vascular Exam:  DP and PT pulses faintly palpable right foot, palpable left foot. Right wet gangrenous changes right first and fifth toe, brisk CFT to digits 2 through 4 right foot. Diffuse nonpitting edema right lower extremity. Neuro Exam:   Diminished protective sensation bilaterally. Dermatologic Exam: Postsurgical debridement right foot with exposed muscle tendon and bone, diffusely necrotic strongly malodorous full-thickness necrotic bone and soft tissue. Positive crepitations extending from the right foot all the way up the proximal calf and into the popliteal region. Diffuse erythema periwound with seropurulent tannish-gray discharge throughout the right foot. Exposed plantar fascia no proximal streaking. MSK: Deferred musculoskeletal testing. Significant tenderness on palpation of periwound, tenderness on palpation to proximal calf with positive crepitations throughout.     Current Facility-Administered Medications   Medication Dose Route Frequency Provider Last Rate Last Admin    atorvastatin (LIPITOR) tablet 40 mg  40 mg Oral Nightly Reyna Ferrell PA-C        aspirin EC tablet 81 mg  81 mg Oral Daily Reyna Ferrell PA-C   81 mg at 04/18/22 1015    glucose (GLUTOSE) 40 % oral gel 15 g  15 g Oral PRN Reyna Ferrell PA-C        glucagon (rDNA) injection 1 mg  1 mg IntraMUSCular PRN Reyna Ferrell PA-C        dextrose 5 % solution  100 mL/hr IntraVENous PRN Reyna Ferrell PA-C        0.9 % sodium chloride infusion   IntraVENous Continuous Brooke Orourke PA-C   Stopped at 04/18/22 1516    sodium chloride flush 0.9 % injection 10 mL  10 mL IntraVENous 2 times per day Reyna Ferrell PA-C   10 mL at 04/19/22 0836    sodium chloride flush 0.9 % injection 10 mL  10 mL IntraVENous PRN Reyna Ferrell PA-C        0.9 % sodium chloride infusion   IntraVENous PRN Reyna Ferrell PA-C        polyethylene glycol (GLYCOLAX) packet 17 g  17 g Oral Daily PRN Reyna Ferrell PA-C        acetaminophen (TYLENOL) tablet 650 mg  650 mg Oral Q6H PRN Reyna Ferrell PA-C        Or    acetaminophen (TYLENOL) suppository 650 mg  650 mg Rectal Q6H PRN Reyna Ferrell PA-C        vitamin D (ERGOCALCIFEROL) capsule 50,000 Units  50,000 Units Oral Weekly Reyna Ferrell PA-C   50,000 Units at 04/18/22 1016    clopidogrel (PLAVIX) tablet 75 mg  75 mg Oral Daily Reyna Ferrell PA-C   75 mg at 04/18/22 1015    piperacillin-tazobactam (ZOSYN) 3,375 mg in dextrose 5 % 50 mL IVPB extended infusion (mini-bag)  3,375 mg IntraVENous Q8H Reyna Ferrell PA-C   Stopped at 04/19/22 0730    0.9 % sodium chloride infusion  25 mL IntraVENous Q8H Reyna Ferrell PA-C   Stopped at 04/19/22 0900    magnesium sulfate 1000 mg in dextrose 5% 100 mL IVPB  1,000 mg IntraVENous PRN Reyna Ferrell PA-C        vancomycin 1500 mg in dextrose 5% 300 mL IVPB  20 mg/kg IntraVENous Q24H Reyna Ferrell PA-C   Stopped at 04/18/22 1326    potassium chloride 10 mEq/100 mL IVPB (Peripheral Line)  10 mEq IntraVENous PRN Reyna Ferrell PA-C   Stopped at 04/19/22 0601    magnesium sulfate 1000 mg in dextrose 5% 100 mL IVPB  1,000 mg IntraVENous PRN Murlean Flight, PA-C   Stopped at 04/19/22 0542    sodium phosphate 10 mmol in dextrose 5 % 250 mL IVPB  10 mmol IntraVENous PRN Murlean Flight, JANI   Stopped at 04/19/22 7821    Or    sodium phosphate 15 mmol in dextrose 5 % 250 mL IVPB  15 mmol IntraVENous PRN Reyna Ferrell PA-C        Or    sodium phosphate 20 mmol in dextrose 5 % 500 mL IVPB  20 mmol IntraVENous PRN Josiane Longoria PA-C   Stopped at 04/19/22 0217    dextrose 5 % and 0.45 % sodium chloride infusion   IntraVENous Continuous PRN Harshad Ferrell PA-C 150 mL/hr at 04/19/22 0630 Rate Verify at 04/19/22 0630    insulin regular (HUMULIN R;NOVOLIN R) 100 Units in sodium chloride 0.9 % 100 mL infusion  0.1 Units/kg/hr IntraVENous Continuous Reyna Ferrell PA-C 3.6 mL/hr at 04/19/22 1026 3.55 Units/hr at 04/19/22 1026    dextrose bolus (hypoglycemia) 10% 125 mL  125 mL IntraVENous PRN Reyna Ferrell PA-C        Or    dextrose bolus (hypoglycemia) 10% 250 mL  250 mL IntraVENous PRN Reyna Ferrell PA-C        pantoprazole (PROTONIX) injection 40 mg  40 mg IntraVENous Daily Reyna Ferrell PA-C   40 mg at 04/19/22 0837    HYDROmorphone (DILAUDID) injection 0.25 mg  0.25 mg IntraVENous Q3H PRN Reyna Ferrell PA-C        Or    HYDROmorphone (DILAUDID) injection 0.5 mg  0.5 mg IntraVENous Q3H PRN Reyna Ferrell PA-C   0.5 mg at 04/19/22 0505    sodium hypochlorite (DAKINS) 0.125 % external solution   Irrigation Daily Elizabeth Cuenca MD   Given at 04/19/22 0836    sodium chloride flush 0.9 % injection 10 mL  10 mL IntraVENous PRN Josiane Longoria PA-C            Lab Results   Component Value Date    WBC 27.2 (H) 04/19/2022    HCT 23.8 (L) 04/19/2022    HGB 7.7 (L) 04/19/2022     04/19/2022     (L) 04/19/2022    K 4.5 04/19/2022     04/19/2022    CO2 17 (L) 04/19/2022    BUN 34 (H) 04/19/2022    CREATININE 1.0 04/19/2022    GLUCOSE 160 (H) 04/19/2022    CRP 61.2 (H) 04/17/2022       ASSESSMENT:  -S/p B right foot incision and drainage, debridement, bone biopsy entheses DOS 4/17/2022)  - Gas gangrene   - Cellulitis with necrosis of muscle and bone right foot, POA, postsurgical debridement.   -Acute osteomyelitis right foot, POA  -Uncontrolled type 2 diabetes mellitus peripheral neuropathy     PLAN:  - Examined and evaluated  - All labs, imaging, and charts reviewed    -WBC 27.2  - S/P R guillotine AKA with Vascular 4/18/22. -X-ray tib-fib, foot and ankle 4/17/2022: Extensive soft tissue swelling and gas visualized throughout the right lower extremity extending from the foot to the proximal calf and slightly above the knee. -Intraoperative cultures 4/17/2022: Pending  -Vascular surgery consulted. To follow with definitive proximal limb amputation secondary to infection. Discussed with vascular surgery. -ABX: Vanco/cefepime/Clinda, ID following  -Central line in place. Patient admitted to ICU   -We will defer further surgical invention to vascular surgery at this time. Contralateral limb stable at this time. Podiatry to sign off. Please feel free to reconsult our service for any lower extremity complaints. D/W: Dr. Ginette Valdez    Thank you for involving podiatry in this patients care. Please do not hesitate to call with any questions or concerns.      [employer] [employer] Podiatry PGY2  4/19/2022   10:48 AM

## 2022-04-19 NOTE — PROGRESS NOTES
noted.   HEENT: Round and reactive pupils. Moist mucous membranes. No ulcerations or thrush. Neck: Supple to movements. Chest: No use of accessory muscles to breathe. Symmetrical expansion. No wheezing, crackles or rhonchi. Cardiovascular: S1 and S2 are rhythmic and regular. No murmurs appreciated. Abdomen: Positive bowel sounds to auscultation. Benign to palpation. No masses felt. No hepatosplenomegaly. Genitourinary: Female  Extremities: No clubbing, no cyanosis, no edema.   Status post AKA on the right 4/18/2020  Lines: peripheral    Laboratory and Tests Review:  Lab Results   Component Value Date    WBC 27.2 (H) 04/19/2022    WBC 60.2 (H) 04/18/2022    WBC 64.2 (H) 04/17/2022    HGB 7.7 (L) 04/19/2022    HCT 23.8 (L) 04/19/2022    MCV 93.3 04/19/2022     04/19/2022     Lab Results   Component Value Date    NEUTROABS 23.93 (H) 04/19/2022    NEUTROABS 55.07 (H) 04/18/2022    NEUTROABS 58.78 (H) 04/17/2022     No results found for: [facility]  Lab Results   Component Value Date    ALT 9 04/17/2022    AST 12 04/17/2022    ALKPHOS 200 (H) 04/17/2022    BILITOT 0.9 04/17/2022     Lab Results   Component Value Date     04/19/2022    K 4.1 04/19/2022    K 5.6 04/18/2022     04/19/2022    CO2 18 04/19/2022    BUN 32 04/19/2022    CREATININE 1.1 04/19/2022    CREATININE 1.0 04/19/2022    CREATININE 1.1 04/19/2022    GFRAA >60 04/19/2022    LABGLOM 50 04/19/2022    GLUCOSE 123 04/19/2022    PROT 9.2 04/17/2022    LABALBU 2.6 04/17/2022    CALCIUM 7.7 04/19/2022    BILITOT 0.9 04/17/2022    ALKPHOS 200 04/17/2022    AST 12 04/17/2022    ALT 9 04/17/2022     Lab Results   Component Value Date    CRP 61.2 (H) 04/17/2022     Lab Results   Component Value Date    SEDRATE 103 (H) 04/17/2022    SEDRATE 42 (H) 03/15/2021     Radiology:      Microbiology:   Lab Results   Component Value Date    BC 24 Hours no growth 04/17/2022    ORG MRSA nasal colonization 04/18/2022     Lab Results   Component Value Date BLOODCULT2 24 Hours no growth 04/17/2022    ORG MRSA nasal colonization 04/18/2022     No results found for: WNDABS  No results found for: RESPSMEAR  No results found for: MPNEUMO, CLAMYDCU, LABLEGI, AFBCX, FUNGSM, LABFUNG  No results found for: CULTRESP  No results found for: CXCATHTIP  No results found for: BFCS  Culture Surgical   Date Value Ref Range Status   04/17/2022 Culture in progress  Preliminary     Urine Culture, Routine   Date Value Ref Range Status   06/24/2016 Growth not present  Final     No results found for: [facility]    ASSESSMENT:  · Gangrene of right lower extremity; status post amputation AKA on the right on 4/18/2022  · Leukocytosis related to the above    PLAN:  · Continue perioperative antibiotics for another day  · Check final cultures  · Davide Barroso MD  2:19 PM  4/19/2022

## 2022-04-19 NOTE — CARE COORDINATION
Transition of Care-POD #1-R AKA, patient remains on IV Zosyn & Vancomycin, ID following. [facility] referral made 4/18, will likely require LTACH level of care. PT/OT to evaluate when medically stable. Insulin gtt stopped this afternoon. CM/SW following.     Nestor BHAGAT, RN  [facility]

## 2022-04-19 NOTE — PROGRESS NOTES
[facility] Progress Note    --------------------------------------------------------------------------------------  Assessment / Plan  · Necrotizing fasciitis of the right foot with sepsis  · Metabolic acidosis w elevated anion gap / lactic acidosis  · Hyponatremia and hyperkalemia likely due to acidosis  · Encephalopathy, unclear etiology, worsening  · NIDDM with PVD  · Hx HPL, CVAs    No longer meets sepsis criteria. Status post right AKA yesterday with vascular surgery. Podiatry has signed off, deferring any further surgical intervention to the vascular surgical team.  Dexter Betina relatively stable, though hemoglobin is dropped to 7.7 likely blood loss from the surgery. White count about half of what it was at 27 today. Lactic acidosis persists and patient borderline DKA last evening/overnight and was on insulin infusion as managed by endocrinology. Cultures obtained, patient is currently on vancomycin and Zosyn per ID. Mental status is a bit worse today, patient more obtunded, checking CT head.  Code status  Full   DVT prophylaxis SCDs   Disposition  To be determined  --------------------------------------------------------------------------------------    Admission Date  4/17/2022  9:54 PM  Chief Complaint AMS, fatigue    Subjective  History of Present Illness  Underwent right above-the-knee amputation yesterday with vascular surgery, came back to the room and was notably less alert on arrival back to the room. This unfortunately has continued the patient is fairly tender this morning, she will occasionally grunt simple sounds, not exactly sure if there are actual answers or not. Otherwise, pale, lying back in bed with eyes closed, but in no overt distress.     Review of Systems - 12-point review of systems has been reviewed and is otherwise negative except as listed in the HPI    Objective  Physical Exam  Vitals: BP (!) 92/58   Pulse 74   Temp 97.6 °F (36.4 °C) (Oral)   Resp 17 Ht 5' 3\" (1.6 m)   Wt 166 lb 11.2 oz (75.6 kg)   SpO2 98%   BMI 29.53 kg/m²   General: well-developed, well-nourished, no acute distress, cooperative  Skin: warm, dry, intact, normal color without cyanosis  HEENT: normocephalic, atraumatic, mucous membranes normal  Respiratory: clear to auscultation bilaterally without respiratory distress  Cardiovascular: regular rate and rhythm without murmur / rub / gallop  Abdominal: soft, nontender, nondistended, normoactive bowel sounds  Extremities: no mottling, pulses intact, no edema, s/p R AKA  Neurologic: obtunded  Psychiatric: unable to assess    Electronically signed by Tenzin Yanez DO on 4/19/2022 at 9:13 AM

## 2022-04-19 NOTE — PROGRESS NOTES
Critical Care Team - Daily Progress Note      Date and time: 4/19/2022 8:03 AM  Patient's name: Bridgett Dee Record Number: [id number]  Patient's account/billing number: [de-identified]  Patient's YOB: 1960  Age: 57 y.o.   Date of Admission: 4/17/2022  9:54 PM  Length of stay during current admission: 2      Primary Care Physician: Fawad Newby DO  ICU Attending Physician: Dr. Rhys Kaiser    Code Status: Prior    Reason for ICU admission: necrotizing fasciitis      SUBJECTIVE:     OVERNIGHT EVENTS:         4/19: s/p AKA yesterday; Patient more altered today, answering only \"mhm\" to most questions    AWAKE & FOLLOWING COMMANDS:  [x] No   [] Yes    CURRENT VENTILATION STATUS:     [] Ventilator  [] BIPAP  [] Nasal Cannula [x] Room Air      IF INTUBATED, ET TUBE MARKING AT LOWER LIP:       cms    SECRETIONS Amount:  [] Small [] Moderate  [] Large  [] None  Color:     [] White [] Colored  [] Bloody    SEDATION:  RAAS Score:  [] Propofol gtt  [] Versed gtt  [] Ativan gtt   [] No Sedation    PARALYZED:  [x] No    [] Yes    DIARRHEA:                [x] No                [] Yes  (C. Difficile status: [] positive                                                                                                                       [] negative                                                                                                                     [] pending)    VASOPRESSORS:  [x] No    [] Yes    If yes -   [] Levophed       [] Dopamine     [] Vasopressin       [] Dobutamine  [] Phenylephrine         [] Epinephrine    CENTRAL LINES:     [] No   [] Yes   (Date of Insertion:   )           If yes -     [x] Right IJ     [] Left IJ [] Right Femoral [] Left Femoral                   [] Right Subclavian [] Left Subclavian       KING'S CATHETER:   [] No   [x] Yes  (Date of Insertion:   )     URINE OUTPUT:            [x] Good   [] Low              [] Anuric      OBJECTIVE:     VITAL SIGNS:  /60 Pulse 73   Temp 98.1 °F (36.7 °C) (Axillary)   Resp 17   Ht 5' 3\" (1.6 m)   Wt 166 lb 11.2 oz (75.6 kg)   SpO2 97%   BMI 29.53 kg/m²   Tmax over 24 hours:  Temp (24hrs), Av °F (37.2 °C), Min:97.8 °F (36.6 °C), Max:100.5 °F (38.1 °C)      Patient Vitals for the past 6 hrs:   BP Temp Temp src Pulse Resp SpO2 Height Weight   22 0700 100/60 -- -- 73 17 97 % -- --   22 0600 (!) 87/53 -- -- 71 16 98 % -- --   22 0500 (!) 100/53 -- -- 71 17 97 % -- --   22 0400 (!) 90/52 98.1 °F (36.7 °C) Axillary 79 22 95 % -- --   22 0300 118/67 -- -- 87 26 97 % 5' 3\" (1.6 m) 166 lb 11.2 oz (75.6 kg)         Intake/Output Summary (Last 24 hours) at 2022 0803  Last data filed at 2022 0630  Gross per 24 hour   Intake 6419.77 ml   Output 1085 ml   Net 5334.77 ml     Wt Readings from Last 2 Encounters:   22 166 lb 11.2 oz (75.6 kg)   21 192 lb (87.1 kg)     Body mass index is 29.53 kg/m².         PHYSICAL EXAMINATION:    General appearance - lying in bed with eyes closed, ill-appearing  Mental status - GCS 9- E4, M5, V4  Eyes - pupils equal and reactive, extraocular eye movements intact, sclera anicteric  Ears - external ear normal  Nose - normal and patent, no erythema, discharge or polyps  Mouth - mucous membranes moist, pharynx normal without lesions  Neck - supple, no significant adenopathy  Chest - clear to auscultation, no wheezes, rales or rhonchi, symmetric air entry  Heart - normal rate, regular rhythm, normal S1, S2, no murmurs, rubs, clicks or gallops  Abdomen - soft, nontender, nondistended, no masses or organomegaly  Neurological - GCS 9  Extremities - R AKA present, wrapped in kerlix  Skin - normal coloration and turgor, no rashes, no suspicious skin lesions noted      Any additional physical findings:    MEDICATIONS:    Scheduled Meds:   atorvastatin  40 mg Oral Nightly    aspirin  81 mg Oral Daily    sodium chloride flush  10 mL IntraVENous 2 times per day    vitamin D  50,000 Units Oral Weekly    clopidogrel  75 mg Oral Daily    piperacillin-tazobactam  3,375 mg IntraVENous Q8H    vancomycin  20 mg/kg IntraVENous Q24H    pantoprazole  40 mg IntraVENous Daily    sodium hypochlorite   Irrigation Daily     Continuous Infusions:   dextrose      sodium chloride Stopped (04/18/22 1516)    sodium chloride      sodium chloride Stopped (04/19/22 0317)    dextrose 5 % and 0.45 % NaCl 150 mL/hr at 04/19/22 0630    insulin 6 Units/hr (04/19/22 0727)     PRN Meds:   glucose, 15 g, PRN  glucagon (rDNA), 1 mg, PRN  dextrose, 100 mL/hr, PRN  sodium chloride flush, 10 mL, PRN  sodium chloride, , PRN  polyethylene glycol, 17 g, Daily PRN  acetaminophen, 650 mg, Q6H PRN   Or  acetaminophen, 650 mg, Q6H PRN  magnesium sulfate, 1,000 mg, PRN  potassium chloride, 10 mEq, PRN  magnesium sulfate, 1,000 mg, PRN  sodium phosphate IVPB, 10 mmol, PRN   Or  sodium phosphate IVPB, 15 mmol, PRN   Or  sodium phosphate IVPB, 20 mmol, PRN  dextrose 5 % and 0.45 % NaCl, , Continuous PRN  dextrose bolus (hypoglycemia), 125 mL, PRN   Or  dextrose bolus (hypoglycemia), 250 mL, PRN  HYDROmorphone, 0.25 mg, Q3H PRN   Or  HYDROmorphone, 0.5 mg, Q3H PRN  sodium chloride flush, 10 mL, PRN          VENT SETTINGS (Comprehensive) (if applicable):  Vent Information  SpO2: 97 %  Additional Respiratory  Assessments  Pulse: 73  Resp: 17  SpO2: 97 %  Oral Care: Mouth swabbed,Lip moisturizer applied          Laboratory findings:    Complete Blood Count:   Recent Labs     04/17/22  2209 04/18/22  0250 04/19/22  0500   WBC 64.2* 60.2* 27.2*   HGB 12.4 11.0* 7.7*   HCT 37.5 33.5* 23.8*    318 210        Last 3 Blood Glucose:   Recent Labs     04/18/22  2300 04/19/22  0310 04/19/22  0645   GLUCOSE 148* 146* 160*        PT/INR:    Lab Results   Component Value Date    PROTIME 19.7 04/18/2022    INR 1.8 04/18/2022     PTT:    Lab Results   Component Value Date    APTT 27.0 04/18/2022       Comprehensive Metabolic Profile:   Recent Labs     04/17/22  2302 04/18/22  0520 04/18/22  2300 04/19/22  0310 04/19/22  0645   *   < > 133 129* 128*   K 5.4*   < > 4.2 4.5 4.5   CL 90*   < > 103 100 101   CO2 18*   < > 19* 18* 17*   BUN 49*   < > 39* 36* 34*   CREATININE 1.3*   < > 1.1* 1.1* 1.0   GLUCOSE 399*   < > 148* 146* 160*   CALCIUM 9.7   < > 8.2* 8.0* 7.6*   PROT 9.2*  --   --   --   --    LABALBU 2.6*  --   --   --   --    BILITOT 0.9  --   --   --   --    ALKPHOS 200*  --   --   --   --    AST 12  --   --   --   --    ALT 9  --   --   --   --     < > = values in this interval not displayed. Magnesium:   Lab Results   Component Value Date    MG 2.3 04/19/2022     Phosphorus:   Lab Results   Component Value Date    PHOS 3.1 04/19/2022     Ionized Calcium: No results found for: CAION     Urinalysis:     Troponin: No results for input(s): TROPONINI in the last 72 hours. Microbiology:    Cultures during this admission:     Blood cultures:                 [] None drawn      [] Negative             []  Positive (Details:  )  Urine Culture:                   [] None drawn      [] Negative             []  Positive (Details:  )  Sputum Culture:               [] None drawn       [] Negative             []  Positive (Details:  )   Endotracheal aspirate:     [] None drawn       [] Negative             []  Positive (Details:  )     Other pertinent Labs:       Radiology/Imaging:   XR TIBIA FIBULA RIGHT (2 VIEWS)    Result Date: 4/17/2022  EXAMINATION: 3 XRAY VIEWS OF THE RIGHT TIBIA AND FIBULA; THREE XRAY VIEWS OF THE RIGHT FOOT 4/17/2022 10:13 pm COMPARISON: None. HISTORY: ORDERING SYSTEM PROVIDED HISTORY: Pain TECHNOLOGIST PROVIDED HISTORY: Reason for exam:->Pain; ORDERING SYSTEM PROVIDED HISTORY: pain TECHNOLOGIST PROVIDED HISTORY: Reason for exam:->pain FINDINGS: No acute fracture or subluxation is identified.   There is soft tissue swelling and gas within the right foot and tracking proximally to the level of the right knee. No radiopaque foreign body is identified. Extensive soft tissue swelling and gas throughout the visualized right lower extremity, concerning for gas forming infection and necrotizing fasciitis. XR FOOT RIGHT (MIN 3 VIEWS)    Result Date: 4/17/2022  EXAMINATION: 3 XRAY VIEWS OF THE RIGHT TIBIA AND FIBULA; THREE XRAY VIEWS OF THE RIGHT FOOT 4/17/2022 10:13 pm COMPARISON: None. HISTORY: ORDERING SYSTEM PROVIDED HISTORY: Pain TECHNOLOGIST PROVIDED HISTORY: Reason for exam:->Pain; ORDERING SYSTEM PROVIDED HISTORY: pain TECHNOLOGIST PROVIDED HISTORY: Reason for exam:->pain FINDINGS: No acute fracture or subluxation is identified. There is soft tissue swelling and gas within the right foot and tracking proximally to the level of the right knee. No radiopaque foreign body is identified. Extensive soft tissue swelling and gas throughout the visualized right lower extremity, concerning for gas forming infection and necrotizing fasciitis. XR CHEST PORTABLE    Result Date: 4/18/2022  EXAMINATION: ONE XRAY VIEW OF THE CHEST 4/18/2022 8:50 am COMPARISON: April 17, 2022 HISTORY: ORDERING SYSTEM PROVIDED HISTORY: central line placement TECHNOLOGIST PROVIDED HISTORY: Reason for exam:->central line placement FINDINGS: The cardiomediastinal silhouette is not enlarged. No pleural effusion or pneumothorax. No focal consolidation. Right central venous catheter projects over the SVC. No acute cardiopulmonary abnormality. Right central venous catheter projects over the SVC. No pneumothorax. XR CHEST PORTABLE    Result Date: 4/17/2022  EXAMINATION: ONE XRAY VIEW OF THE CHEST 4/17/2022 8:13 pm COMPARISON: 07/31/2021 HISTORY: ORDERING SYSTEM PROVIDED HISTORY: eval for Pneumonia TECHNOLOGIST PROVIDED HISTORY: Reason for exam:->eval for Pneumonia FINDINGS: The lungs are without acute focal process. There is no effusion or pneumothorax. The cardiomediastinal silhouette is without acute process.  The osseous structures are without acute process. No acute process. CT FEMUR RIGHT WO CONTRAST    Result Date: 4/18/2022  EXAMINATION: CT OF THE RIGHT FEMUR WITH CONTRAST; CT OF THE RIGHT TIBIA AND FIBULA WITHOUT CONTRAST 4/18/2022 12:40 pm TECHNIQUE: CT of the right femur was performed with the administration of intravenous contrast.  Multiplanar reformatted images are provided for review. Dose modulation, iterative reconstruction, and/or weight based adjustment of the mA/kV was utilized to reduce the radiation dose to as low as reasonably achievable.; CT of the right tibia and fibula was performed without the administration of intravenous contrast.  Multiplanar reformatted images are provided for review. Dose modulation, iterative reconstruction, and/or weight based adjustment of the mA/kV was utilized to reduce the radiation dose to as low as reasonably achievable. COMPARISON: None. HISTORY ORDERING SYSTEM PROVIDED HISTORY: Rule out air in the soft tissues TECHNOLOGIST PROVIDED HISTORY: Reason for exam:->Rule out air in the soft tissues FINDINGS: Bones: Questionable tiny erosions involving the medial aspect of the tibial plafond and with overlying soft tissue ulcer, suspicious for osteomyelitis. Also, questionable tiny erosions involving the medial cortex of the medial cuneiform with overlying soft tissue ulcer. Soft Tissue: Soft tissue air/gas on the dorsal aspect of distal thigh and calf superficial to and within the gastrocnemius muscles extending down to the medial aspect of the foot. Large soft tissue defect/ulcer on the medial aspect of the ankle. Vascular calcification noted. Joint: No significant hip, knee, or ankle joint effusion. Osteoarthritis of the right hip and knee joints. Extensive soft tissue gas on the dorsal aspect of distal thigh, entire calf and ankle and medial aspect of the foot.  Findings suspicious for osteomyelitis involving the tibial plafond and medial aspect of the medial cuneiform. MRI may be obtained to confirm. The findings were sent to the Radiology Results [id number] at 12:53 pm on 4/18/2022to be communicated to a licensed caregiver. CT TIBIA FIBULA RIGHT WO CONTRAST    Result Date: 4/18/2022  EXAMINATION: CT OF THE RIGHT FEMUR WITH CONTRAST; CT OF THE RIGHT TIBIA AND FIBULA WITHOUT CONTRAST 4/18/2022 12:40 pm TECHNIQUE: CT of the right femur was performed with the administration of intravenous contrast.  Multiplanar reformatted images are provided for review. Dose modulation, iterative reconstruction, and/or weight based adjustment of the mA/kV was utilized to reduce the radiation dose to as low as reasonably achievable.; CT of the right tibia and fibula was performed without the administration of intravenous contrast.  Multiplanar reformatted images are provided for review. Dose modulation, iterative reconstruction, and/or weight based adjustment of the mA/kV was utilized to reduce the radiation dose to as low as reasonably achievable. COMPARISON: None. HISTORY ORDERING SYSTEM PROVIDED HISTORY: Rule out air in the soft tissues TECHNOLOGIST PROVIDED HISTORY: Reason for exam:->Rule out air in the soft tissues FINDINGS: Bones: Questionable tiny erosions involving the medial aspect of the tibial plafond and with overlying soft tissue ulcer, suspicious for osteomyelitis. Also, questionable tiny erosions involving the medial cortex of the medial cuneiform with overlying soft tissue ulcer. Soft Tissue: Soft tissue air/gas on the dorsal aspect of distal thigh and calf superficial to and within the gastrocnemius muscles extending down to the medial aspect of the foot. Large soft tissue defect/ulcer on the medial aspect of the ankle. Vascular calcification noted. Joint: No significant hip, knee, or ankle joint effusion. Osteoarthritis of the right hip and knee joints.      Extensive soft tissue gas on the dorsal aspect of distal thigh, entire calf and ankle and medial aspect of the foot. Findings suspicious for osteomyelitis involving the tibial plafond and medial aspect of the medial cuneiform. MRI may be obtained to confirm. The findings were sent to the Radiology Results [id number] at 12:53 pm on 4/18/2022to be communicated to a licensed caregiver. VL LOWER EXTREMITY ARTERIAL SEGMENTAL PRESSURES W PPG    Result Date: 4/18/2022  EXAMINATION: ARTERIAL DUPLEX ULTRASOUND OF THE BILATERAL LOWER EXTREMITIES WITH SEGMENTAL PRESSURES AND PULSE VOLUME RECORDINGS 4/18/2022 2:19 pm TECHNIQUE: Arterial duplex OLIVER ultrasound. Segmental pressures and PVR performed with Doppler. COMPARISON: None. HISTORY: ORDERING SYSTEM PROVIDED HISTORY: PVD TECHNOLOGIST PROVIDED HISTORY: Reason for exam:->PVD What reading provider will be dictating this exam?->CRC FINDINGS: Unable to obtain right dorsalis pedis artery pulses due to wound dressings. Unable to obtain left posterior tibial artery pulse due to positioning and pain. Patient movement limited tracings. The OLIVER on the right is 0.5. The OLIVER on the left is 0.6. Right lower extremity: Arterial Doppler evaluation from the common femoral artery through to the posterior tibial artery demonstrates dampened but preserved biphasic waveforms. PVR evaluation from the calf to the metatarsal demonstrates preserved pulsatility and relatively preserved amplitudes through to the right ankle. Non pulsatility in the right metatarsal. Digital waveform evaluation of the digits of the right foot demonstrate non pulsatility in all digits. Left lower extremity: Arterial Doppler evaluation from the common femoral artery through to the dorsalis pedis artery demonstrates dampened but preserved biphasic waveforms. PVR evaluation from the left calf to the left ankle demonstrates irregular but preserved pulsatility with relatively preserved amplitudes.  Digital waveform evaluation of the digits of the left foot demonstrate preserved pulsatility with severely decreased amplitudes in digits 1 and 2. Non pulsatility in digits 3 through 5.     1.  Abnormal ankle brachial indices bilaterally at 0.5 on the right and 0.6 on the left. Dampened but preserved biphasic waveforms at all levels bilaterally. Changes probably secondary to 2 advanced underlying peripheral vascular disease. 2. PVR evaluation of the bilateral lower extremities from the calf to the ankles demonstrates irregular but preserved pulsatility with relatively preserved amplitudes. Non pulsatility in the right metatarsal.  Digital waveform evaluation of the digits of the bilateral feet were markedly abnormal.  On the right there is non pulsatility in all digits. On the left there is non pulsatility in digits 3 through 5 with preserved pulsatility but severely decreased amplitudes in digits 1 and 2. These findings are most likely secondary to advanced underlying microvascular disease. At this time there is poor collateral flow to the digits of the bilateral feet right worse than left.         Chest Xray (4/19/2022):    ASSESSMENT:     Patient Active Problem List    Diagnosis Date Noted    Necrotizing cellulitis 04/18/2022    Necrotizing fasciitis ([address] 04/17/2022    Cellulitis in diabetic foot ([address] 04/17/2022    Acute CVA (cerebrovascular accident) ([address] 07/31/2021    Cerebral infarct ([address] 07/27/2021    Hyperglycemia due to diabetes mellitus ([address] 05/27/2021    Multiple lacunar infarcts ([address] 03/25/2021    DDD (degenerative disc disease), cervical 03/25/2021    Bone marrow edema 03/25/2021    Lumbar degenerative disc disease 03/15/2021    Numbness and tingling 03/15/2021    Postural dizziness 03/15/2021    Neuropathy 02/25/2021    Light headedness 02/25/2021    Neurologic gait dysfunction 02/25/2021    Vitamin D deficiency 10/11/2020    Mixed hyperlipidemia 10/11/2020    Vasovagal syncope 10/06/2020    Type 2 diabetes mellitus without complication, without long-term current use of insulin ([facility] 10/06/2020    Cancer of kidney ([facility] 10/11/2016           PLAN:     WEAN PER PROTOCOL:  [] No   [] Yes  [] N/A    DISCONTINUE ANY LABS:   [] No   [] Yes    ICU PROPHYLAXIS:  Stress ulcer:  [] PPI Agent  [] [id number] [] Sucralfate  [] Other:  VTE:   [] Enoxaparin  [] Unfract. Heparin Subcut  [] EPC Cuffs    NUTRITION:  [] NPO [] Tube Feeding (Specify: ) [] TPN  [] PO (Diet: Diet NPO Exceptions are: Sips of Water with Meds)    HOME MEDICATIONS RECONCILED: [] No  [] Yes    INSULIN DRIP:   [x] No   [] Yes    CONSULTATION NEEDED:  [] No   [x] Yes    FAMILY UPDATED:    [] No   [] Yes    TRANSFER OUT OF ICU:   [x] No   [] Yes          SYSTEMS ASSESSMENT     Neuro   AMS- baseline unknown at this time  Worsening today. Answering \"mhm\" to most questions; GCS (E4, M5, V4)  CT head ordered, old infarcts noted, but nothing acute  ABGs showed pH 7.389, pCO2 26.1, pO2 95, HCO3 97.3  Metabolic encephalopathy?  Continue to monitor    Previous history of CVA     Respiratory   On RA   Keep O2 sat 90-92%     Cardiovascular   On aspirin and plavix- held due to surgery       Gastrointestinal   Protonix ppx  NPO in anticipation of surgery     Renal   Becoming hyponatremic; bicarb 17  Will give 3 amps bicarb, start D5NS     Infectious Disease   Necrotizing fasciitis of RLE  S/p I&D and debridement 4/17/22  S/p AKA 4/18  Received vanc, cefepime, and clindamycin in the ED  Currently on Vanc and zosyn  ID and vascular surgery following  WBC downtrending from 60.2 --> 27.2  Gram stain from I&D growing abundant gram positive cocci in pairs, abundant gram negative rods, few gram positive cocci in clusters, and few gram positive rods          Hematology/Oncology   Hemoglobin 7.7 today, downtrending from 11.0 yesterday; likely blood loss from AKA; continue to trend H&H, transfuse for hemoglobin <7         Endocrine   Hyperglycemia; pH 7.36, beta hydroxybutyrate 1.81 on initial presentation  AG closed x2- d/c insulin gtt, transition to specialty care, primary care and the patient's family as available. Restraints are ordered when the patient can do harm to him/herself by pulling out devices. Critical Care Time: > 35 minutes excluding procedures    Dominic Wooten M.D. Dominic Wooten MD  4/19/2022  1:23 PM    Attending Physician Attestation: Dr. Zambrano Carls you very much for allowing me to see this patient in consultation and follow up.     I personally saw, examined and provided care for the patient. Radiographs, labs and medication list were reviewed by me independently. I spoke with bedside nursing, respiratory therapists and consultants. Critical care services and times documented are independent of procedures and multidisciplinary rounds with Residents. Additionally comprehensive, multidisciplinary rounds were conducted with the MICU team. The case was discussed in detail and plans for care were established. Review of Residents documentation was conducted and revisions were made as appropriate.  I agree with the the above documented information.      Physical Examination:  Vitals:   Vitals          Vitals:     04/19/22 1000 04/19/22 1100 04/19/22 1200 04/19/22 1223   BP: (!) 91/56 101/64 (!) 102/59     Pulse: 72 73 71     Resp: 16 16 16     Temp:     97.3 °F (36.3 °C)     TempSrc:     Axillary     SpO2: 97% 98% 98%     Weight:           Height:       5' 3\" (1.6 m)         General: No Acute Distress  HEENT: normocephalic, atruamatic  CVS: RRR, S1, S2, No S3 or S4  Abdomen: soft, NT, ND  Respiratory: decreased breath sounds at lung bases, no focal wheezes noted  Extremities: right stump AKA, wound clean, dry, intact     ASSESSMENT:  1.) Necrotizing Fasciitis and Gangrene of the Right lower Extremity   - s/p right AKA 4/18/2022  2.) DKA, Resolved   3.) Leukocytosis   4.) Acute Kidney Injury   5.) Hypophosphatemia   6.) Acute Encephalopathy - CT Head 4/19/2022 revealed old infarctions   7.) Post-Operative Anemia - Hgb 11.0 (4/18/2022) to 7.7 (4/19/2022)     In addition the following applies:     Check: serial labs, ESR, CRP, H/H at 2PM and 10 PM  Medication Alterations: N/A, transition off insulin, 3 amps bicarbonate    Procedures: s/p right AKA on 4/18/2022  Imaging: reviewed  New Consultations: N/A  VENT: N/A     Access: Right IJ TLC  Consults: ID, Vascular Surgery, Podiatry, Endocrinology   Drips: Insulin to D/C  Nutrition: NPO  ABX: Vancomycin, Zosyn      - patient to remain in ICU level of care at this time  - monitor H/H  - off insulin drip     Thank you for allowing me to participate in the care of this patient.     Care reviewed with nursing staff, medical and surgical specialty care, primary care and the patient's family as available. Restraints are ordered when the patient can do harm to him/herself by pulling out devices.     Critical Care Time: > 35 minutes excluding procedures    Jenna Sabillon M.D.  Merideth Lesches, MD  4/19/2022  1:10 PM

## 2022-04-19 NOTE — PLAN OF CARE
Problem: Falls - Risk of:  Goal: Will remain free from falls  Description: Will remain free from falls  4/19/2022 0019 by Mary Forbes RN  Outcome: Met This Shift

## 2022-04-19 NOTE — PLAN OF CARE
Problem: Falls - Risk of:  Goal: Will remain free from falls  Description: Will remain free from falls  Outcome: Met This Shift     Problem: Falls - Risk of:  Goal: Will remain free from falls  Description: Will remain free from falls  Outcome: Met This Shift     Problem: Skin Integrity:  Goal: Absence of new skin breakdown  Description: Absence of new skin breakdown  Outcome: Met This Shift     Problem: Airway Clearance - Ineffective:  Goal: Ability to maintain a clear airway will improve  Description: Ability to maintain a clear airway will improve  Outcome: Met This Shift     Problem: Aspiration:  Goal: Absence of aspiration  Description: Absence of aspiration  Outcome: Met This Shift     Problem: Gas Exchange - Impaired:  Goal: Levels of oxygenation will improve  Description: Levels of oxygenation will improve  Outcome: Met This Shift     Problem: Mental Status - Impaired:  Goal: Mental status will be restored to baseline  Description: Mental status will be restored to baseline  Outcome: Ongoing     Problem: Pain:  Goal: Control of acute pain  Description: Control of acute pain  Outcome: Met This Shift

## 2022-04-19 NOTE — PROGRESS NOTES
Comprehensive Nutrition Assessment    Type and Reason for Visit:  Positive Nutrition Screen,Initial    Nutrition Recommendations/Plan: Continue NPO and adv diet as tolerated and monitor. Will start ONS w/ diet. Nutrition Assessment:  Pt w/ PMhx DM, PVD, hx RCC s/p nephrectomy (16'), CVA (7/2021). 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 Pt at risk d/t noted poor evert/intake and +wt loss x ~9 months pta as well as pt w/ increased needs for wound healing. Will Start ONS once diet adv and monitor. Malnutrition Assessment:  Malnutrition Status: At risk for malnutrition (Comment)    Context:  Acute Illness     Findings of the 6 clinical characteristics of malnutrition:  Energy Intake:  1 - 75% or less of estimated energy requirements for 7 or more days  Weight Loss:  Unable to assess (2/2 pt s/p AKA)     Body Fat Loss:  Unable to assess     Muscle Mass Loss:  Unable to assess    Fluid Accumulation:  No significant fluid accumulation     Strength:  Not Performed    Estimated Daily Nutrient Needs:  Energy (kcal):  1600-1800kcal/d; Weight Used for Energy Requirements:  Current     Protein (g):  70-85/d (1.5-1.8g/kg per Adj #); Weight Used for Protein Requirements:  Ideal        Fluid (ml/day):  16-1800ml/d; Method Used for Fluid Requirements:  1 ml/kcal      Nutrition Related Findings:   AMSx3, poor dentition, Abd soft no gaurding, Edema trace, I/O +7L, BS hypo, Rt AKA      Wounds:  Surgical Incision       Current Nutrition Therapies:    Diet NPO Exceptions are: Sips of Water with Meds    Anthropometric Measures:  · Height: 5' 3\" (160 cm)  · Current Body Weight: 166 lb 11.2 oz (75.6 kg) (bed 4/19; noted loss vs Adm wt 2/2 pt s/p AKA)   · Admission Body Weight: 173 lb (78.5 kg) (bed 4/18, prior to AKA)    · Usual Body Weight: 199 lb (90.3 kg) (actual 8/1/21 per EMR; ~13% wt loss x ~9 months noted)     · Ideal Body Weight: 115 lbs; % Ideal Body Weight 145 %   · BMI: 29.5  · Adjusted Body Weight: 183.5; Amputation   · Adjusted BMI: 32.5    · BMI Categories: Overweight (BMI 25.0-29. 9)       Nutrition Diagnosis:   · Increased nutrient needs related to increase demand for energy/nutrients as evidenced by wounds      Nutrition Interventions:   Food and/or Nutrient Delivery:  Continue NPO,Start Oral Nutrition Supplement  Nutrition Education/Counseling:  No recommendation at this time   Coordination of Nutrition Care:  Continue to monitor while inpatient    Goals:  nutrtition progression       Nutrition Monitoring and Evaluation:   Behavioral-Environmental Outcomes:  None Identified   Food/Nutrient Intake Outcomes:  Diet Advancement/Tolerance,Food and Nutrient Intake,Supplement Intake  Physical Signs/Symptoms Outcomes:  Biochemical Data,Weight,Skin,Nutrition Focused Physical Findings,Nausea or Vomiting,Chewing or Swallowing,GI Status,Fluid Status or Edema     Discharge Planning:     Too soon to determine     Electronically signed by Rebecca Cordova on 4/19/22 at 12:53 PM EDT    Contact:

## 2022-04-19 NOTE — PATIENT CARE CONFERENCE
Intensive Care Daily Quality Rounding Checklist        ICU Team Members: Dr. Dante Choudhary, residents, charge nurse, bedside nurse, respiratory therapy and clinical pharmacist      ICU Day #: 2     Intubation Date: N/A     Ventilator Day #: N/A     Central Line Insertion Date: 4/18/22                                                    Day #: 2      Arterial Line Insertion Date: N/A                             Day #: N/A     Temporary Hemodialysis Catheter Insertion Date: N/A                             Day #: N/A     DVT Prophylaxis: plavix    GI Prophylaxis: protonix     Mathews Catheter Insertion Date: 4/17                                        Day #: 3                             Continued need (if yes, reason documented and discussed with physician):Yes strict I&O in critically ill patient     Skin Issues/ Wounds and ordered treatment discussed on rounds: Yes - treatments in place     Goals/ Plans for the Day: Monitor labs and vitals. CT of the head.  30 units lantus, 3 amps of Bicarb

## 2022-04-19 NOTE — PROGRESS NOTES
ENDOCRINOLOGY PROGRESS NOTE      Date of admission: 4/17/2022  Date of service: 4/19/2022  Admitting physician: Justina Caputo DO   Primary Care Physician: Carmel Humphrey DO  Consultant physician: Anthony Alvarado MD     Reason for the consultation:  Uncontrolled DM    History of Present Illness: The history is provided from medical records. Pt very sick and wasn't able to provide the history     Monica Titus is a very pleasant 57 y.o. old female with PMH DM type 2, HLD and other listed below admitted to [facility] on 4/17/2022 because of Rt foot swelling and pain and found to have necrotizing fasciitis, endocrine service was consulted for diabetes management.      Subjective   Seen and examined, family at bedtime, transitioned to sq insulin this AM     Inpatient diet:   Carb Restricted diet     Point of care glucose monitoring   (Independently reviewed)   Recent Labs     04/19/22  0430 04/19/22  0529 04/19/22  0626 04/19/22  0726 04/19/22  0831 04/19/22  0935 04/19/22  1025 04/19/22  1821   GLUMET 174* 191* 179* 180* 205* 154* 131* 308*     Scheduled Meds:   insulin glargine  30 Units SubCUTAneous Daily    insulin lispro  0-12 Units SubCUTAneous Q4H    atorvastatin  40 mg Oral Nightly    aspirin  81 mg Oral Daily    sodium chloride flush  10 mL IntraVENous 2 times per day    vitamin D  50,000 Units Oral Weekly    clopidogrel  75 mg Oral Daily    piperacillin-tazobactam  3,375 mg IntraVENous Q8H    vancomycin  20 mg/kg IntraVENous Q24H    pantoprazole  40 mg IntraVENous Daily    sodium hypochlorite   Irrigation Daily       PRN Meds:   sodium chloride, , PRN  glucose, 15 g, PRN  glucagon (rDNA), 1 mg, PRN  dextrose, 100 mL/hr, PRN  sodium chloride flush, 10 mL, PRN  sodium chloride, , PRN  polyethylene glycol, 17 g, Daily PRN  acetaminophen, 650 mg, Q6H PRN   Or  acetaminophen, 650 mg, Q6H PRN  magnesium sulfate, 1,000 mg, PRN  dextrose bolus (hypoglycemia), 125 mL, PRN   Or  dextrose bolus (hypoglycemia), 250 mL, PRN  HYDROmorphone, 0.25 mg, Q3H PRN   Or  HYDROmorphone, 0.5 mg, Q3H PRN  sodium chloride flush, 10 mL, PRN      Continuous Infusions:   dextrose 5 % and 0.9 % NaCl Stopped (04/19/22 1810)    sodium chloride      dextrose      sodium chloride      sodium chloride 25 mL (04/19/22 1516)    insulin Stopped (04/19/22 1253)       Review of Systems  Didn't answer any of Qs     OBJECTIVE    BP (!) 92/55   Pulse 75   Temp 97.3 °F (36.3 °C) (Axillary)   Resp 18   Ht 5' 3\" (1.6 m)   Wt 166 lb 11.2 oz (75.6 kg)   SpO2 97%   BMI 29.53 kg/m²     Intake/Output Summary (Last 24 hours) at 4/19/2022 1828  Last data filed at 4/19/2022 1812  Gross per 24 hour   Intake 5390.55 ml   Output 985 ml   Net 4405.55 ml       Physical examination:  General: sleepy   HEENT: normocephalic non traumatic, no exophthalmos   Neck: supple,    Pulm: good equal air entry no added sounds  CVS: S1 + S2  Abd: soft lax, no tenderness  Skin: s/p Rt side AKA     Review of Laboratory Data:  I personally reviewed the following labs:   Recent Labs     04/17/22 2209 04/17/22  2209 04/18/22  0250 04/19/22  0500 04/19/22  1420   WBC 64.2*  --  60.2* 27.2*  --    RBC 4.06  --  3.61 2.55*  --    HGB 12.4   < > 11.0* 7.7* 7.1*   HCT 37.5   < > 33.5* 23.8* 21.5*   MCV 92.4  --  92.8 93.3  --    MCH 30.5  --  30.5 30.2  --    MCHC 33.1  --  32.8 32.4  --    RDW 14.6  --  14.6 14.6  --      --  318 210  --    MPV 10.7  --  10.6 10.4  --     < > = values in this interval not displayed.      Recent Labs     04/17/22  2302 04/18/22  0520 04/19/22  0310 04/19/22  0645 04/19/22  1100   *   < > 129* 128* 128*   K 5.4*   < > 4.5 4.5 4.1   CL 90*   < > 100 101 100   CO2 18*   < > 18* 17* 18*   BUN 49*   < > 36* 34* 32*   CREATININE 1.3*   < > 1.1* 1.0 1.1*   GLUCOSE 399*   < > 146* 160* 123*   CALCIUM 9.7   < > 8.0* 7.6* 7.7*   PROT 9.2*  --   --   --   --    LABALBU 2.6*  --   --   --   --    BILITOT 0.9  --   --   --   --    ALKPHOS 200*  -- --   --   --    AST 12  --   --   --   --    ALT 9  --   --   --   --     < > = values in this interval not displayed. Beta-Hydroxybutyrate   Date Value Ref Range Status   04/17/2022 1.81 (H) 0.02 - 0.27 mmol/L Final     Lab Results   Component Value Date    LABA1C 7.8 04/18/2022    LABA1C 6.6 07/26/2021    LABA1C 6.3 02/24/2021     Lab Results   Component Value Date/Time    TSH 2.050 02/24/2021 10:19 AM    W1QXUDN 7.2 02/24/2021 10:19 AM     Lab Results   Component Value Date    LABA1C 7.8 04/18/2022    GLUCOSE 123 04/19/2022    LABMICR 115.4 11/25/2020     Lab Results   Component Value Date    TRIG 176 07/26/2021    HDL 37 07/26/2021    LDLCALC 136 07/26/2021    CHOL 208 07/26/2021       Blood culture   Lab Results   Component Value Date    BC 24 Hours no growth 04/17/2022       Radiology:  CT HEAD WO CONTRAST   Final Result   1. Old infarcts within the right temporal and right parietal lobes   2. There is no intracranial hemorrhage   3. Old infarcts within the right and left basal ganglia as well as within the   right thalamus. VL LOWER EXTREMITY ARTERIAL SEGMENTAL PRESSURES W PPG   Final Result   1. Abnormal ankle brachial indices bilaterally at 0.5 on the right and 0.6   on the left. Dampened but preserved biphasic waveforms at all levels   bilaterally. Changes probably secondary to 2 advanced underlying peripheral   vascular disease. 2. PVR evaluation of the bilateral lower extremities from the calf to the   ankles demonstrates irregular but preserved pulsatility with relatively   preserved amplitudes. Non pulsatility in the right metatarsal.  Digital   waveform evaluation of the digits of the bilateral feet were markedly   abnormal.  On the right there is non pulsatility in all digits. On the left   there is non pulsatility in digits 3 through 5 with preserved pulsatility but   severely decreased amplitudes in digits 1 and 2.   These findings are most   likely secondary to advanced underlying microvascular disease. At this time   there is poor collateral flow to the digits of the bilateral feet right worse   than left. CT TIBIA FIBULA RIGHT WO CONTRAST   Final Result   Extensive soft tissue gas on the dorsal aspect of distal thigh, entire calf   and ankle and medial aspect of the foot. Findings suspicious for osteomyelitis involving the tibial plafond and medial   aspect of the medial cuneiform. MRI may be obtained to confirm. The findings were sent to the Radiology Results [address] at 12:53   pm on 4/18/2022to be communicated to a licensed caregiver. CT FEMUR RIGHT WO CONTRAST   Final Result   Extensive soft tissue gas on the dorsal aspect of distal thigh, entire calf   and ankle and medial aspect of the foot. Findings suspicious for osteomyelitis involving the tibial plafond and medial   aspect of the medial cuneiform. MRI may be obtained to confirm. The findings were sent to the Radiology Results [address] at 12:53   pm on 4/18/2022to be communicated to a licensed caregiver. XR CHEST PORTABLE   Final Result   No acute cardiopulmonary abnormality. Right central venous catheter projects over the SVC. No pneumothorax. XR CHEST PORTABLE   Final Result   No acute process. XR FOOT RIGHT (MIN 3 VIEWS)   Final Result   Extensive soft tissue swelling and gas throughout the visualized right lower   extremity, concerning for gas forming infection and necrotizing fasciitis. XR TIBIA FIBULA RIGHT (2 VIEWS)   Final Result   Extensive soft tissue swelling and gas throughout the visualized right lower   extremity, concerning for gas forming infection and necrotizing fasciitis.              Medical Records/Labs/Images review:   I personally reviewed and summarized previous records   All labs and imaging were reviewed independently     [address], a 57 y.o.-old female seen today for inpatient diabetes management     Diabetes Mellitus type 2   · Patient's diabetes is uncontrolled   · We recommend the following regimen   · Lantus 30u daily  · Medium dose sliding scale q4 hrs   · Will help with transition to SQ insulin once DKA resolve   · Continue glucose check with meals and at bedtime   · Will titrate insulin dose based on the blood glucose trend & insulin requirement    Rt LE necrotizing Fasciitis   · S/p Rt AKA 4/18/2022   · Management per primary service     The above issues were reviewed with the patient who understood and agreed with the plan. Thank you for allowing us to participate in the care of this patient. Please do not hesitate to contact us with any additional questions. Meryle Laurence MD  Endocrinologist, [facility] Diabetes Care and Endocrinology   [address] [place] [id number]   Phone: [phone]  Fax: [phone]  --------------------------------------------  An electronic signature was used to authenticate this note.  Kristal Torres MD on 4/19/2022 at 6:28 PM

## 2022-04-19 NOTE — PROGRESS NOTES
Attending Physician Attestation: Dr. Terrell Sargent    Thank you very much for allowing me to see this patient in consultation and follow up. I personally saw, examined and provided care for the patient. Radiographs, labs and medication list were reviewed by me independently. I spoke with bedside nursing, respiratory therapists and consultants. Critical care services and times documented are independent of procedures and multidisciplinary rounds with Residents. Additionally comprehensive, multidisciplinary rounds were conducted with the MICU team. The case was discussed in detail and plans for care were established. Review of Residents documentation was conducted and revisions were made as appropriate. I agree with the the above documented information.      Physical Examination:  Vitals:   Vitals:    04/19/22 1000 04/19/22 1100 04/19/22 1200 04/19/22 1223   BP: (!) 91/56 101/64 (!) 102/59    Pulse: 72 73 71    Resp: 16 16 16    Temp:   97.3 °F (36.3 °C)    TempSrc:   Axillary    SpO2: 97% 98% 98%    Weight:       Height:    5' 3\" (1.6 m)      General: No Acute Distress  HEENT: normocephalic, atruamatic  CVS: RRR, S1, S2, No S3 or S4  Abdomen: soft, NT, ND  Respiratory: decreased breath sounds at lung bases, no focal wheezes noted  Extremities: right stump AKA, wound clean, dry, intact     ASSESSMENT:  1.) Necrotizing Fasciitis and Gangrene of the Right lower Extremity   - s/p right AKA 4/18/2022  2.) DKA, Resolved   3.) Leukocytosis   4.) Acute Kidney Injury   5.) Hypophosphatemia   6.) Acute Encephalopathy - CT Head 4/19/2022 revealed old infarctions   7.) Post-Operative Anemia - Hgb 11.0 (4/18/2022) to 7.7 (4/19/2022)     In addition the following applies:     Check: serial labs, ESR, CRP, H/H at 2PM and 10 PM  Medication Alterations: N/A, transition off insulin, 3 amps bicarbonate    Procedures: s/p right AKA on 4/18/2022  Imaging: reviewed  New Consultations: N/A  VENT: N/A     Access: Right IJ TLC  Consults: ID, Vascular Surgery, Podiatry, Endocrinology   Drips: Insulin to D/C  Nutrition: NPO  ABX: Vancomycin, Zosyn     - patient to remain in ICU level of care at this time  - monitor H/H  - off insulin drip    Thank you for allowing me to participate in the care of this patient. Care reviewed with nursing staff, medical and surgical specialty care, primary care and the patient's family as available. Restraints are ordered when the patient can do harm to him/herself by pulling out devices. Critical Care Time: > 35 minutes excluding procedures    Ni Beck M.D.     Ni Beck MD  4/19/2022  1:10 PM

## 2022-04-20 VITALS
HEIGHT: 63 IN | HEART RATE: 69 BPM | DIASTOLIC BLOOD PRESSURE: 65 MMHG | TEMPERATURE: 97.8 F | OXYGEN SATURATION: 98 % | BODY MASS INDEX: 30.59 KG/M2 | SYSTOLIC BLOOD PRESSURE: 119 MMHG | RESPIRATION RATE: 16 BRPM | WEIGHT: 172.62 LBS

## 2022-04-20 LAB
ANION GAP SERPL CALCULATED.3IONS-SCNC: 8 MMOL/L (ref 7–16)
BUN BLDV-MCNC: 25 MG/DL (ref 6–23)
CALCIUM SERPL-MCNC: 7.8 MG/DL (ref 8.6–10.2)
CHLORIDE BLD-SCNC: 103 MMOL/L (ref 98–107)
CO2: 23 MMOL/L (ref 22–29)
CREAT SERPL-MCNC: 0.9 MG/DL (ref 0.5–1)
GFR AFRICAN AMERICAN: >60
GFR NON-AFRICAN AMERICAN: >60 ML/MIN/1.73
GLUCOSE BLD-MCNC: 236 MG/DL (ref 74–99)
HCT VFR BLD CALC: 22.9 % (ref 34–48)
HEMOGLOBIN: 7.6 G/DL (ref 11.5–15.5)
MAGNESIUM: 2 MG/DL (ref 1.6–2.6)
MCH RBC QN AUTO: 29.6 PG (ref 26–35)
MCHC RBC AUTO-ENTMCNC: 33.2 % (ref 32–34.5)
MCV RBC AUTO: 89.1 FL (ref 80–99.9)
METER GLUCOSE: 245 MG/DL (ref 74–99)
METER GLUCOSE: 248 MG/DL (ref 74–99)
METER GLUCOSE: 262 MG/DL (ref 74–99)
METER GLUCOSE: 277 MG/DL (ref 74–99)
PDW BLD-RTO: 15.9 FL (ref 11.5–15)
PHOSPHORUS: 2.4 MG/DL (ref 2.5–4.5)
PLATELET # BLD: 203 E9/L (ref 130–450)
PMV BLD AUTO: 10.9 FL (ref 7–12)
POTASSIUM REFLEX MAGNESIUM: 3.8 MMOL/L (ref 3.5–5)
RBC # BLD: 2.57 E12/L (ref 3.5–5.5)
SODIUM BLD-SCNC: 134 MMOL/L (ref 132–146)
VANCOMYCIN RANDOM: 22.4 MCG/ML (ref 5–40)
WBC # BLD: 30.2 E9/L (ref 4.5–11.5)

## 2022-04-20 PROCEDURE — 2580000003 HC RX 258: Performed by: INTERNAL MEDICINE

## 2022-04-20 PROCEDURE — 6360000002 HC RX W HCPCS: Performed by: PHYSICIAN ASSISTANT

## 2022-04-20 PROCEDURE — 36415 COLL VENOUS BLD VENIPUNCTURE: CPT

## 2022-04-20 PROCEDURE — 2580000003 HC RX 258: Performed by: STUDENT IN AN ORGANIZED HEALTH CARE EDUCATION/TRAINING PROGRAM

## 2022-04-20 PROCEDURE — 6370000000 HC RX 637 (ALT 250 FOR IP): Performed by: SURGERY

## 2022-04-20 PROCEDURE — 80202 ASSAY OF VANCOMYCIN: CPT

## 2022-04-20 PROCEDURE — 82962 GLUCOSE BLOOD TEST: CPT

## 2022-04-20 PROCEDURE — 2500000003 HC RX 250 WO HCPCS: Performed by: STUDENT IN AN ORGANIZED HEALTH CARE EDUCATION/TRAINING PROGRAM

## 2022-04-20 PROCEDURE — 85027 COMPLETE CBC AUTOMATED: CPT

## 2022-04-20 PROCEDURE — 84100 ASSAY OF PHOSPHORUS: CPT

## 2022-04-20 PROCEDURE — 80048 BASIC METABOLIC PNL TOTAL CA: CPT

## 2022-04-20 PROCEDURE — 6370000000 HC RX 637 (ALT 250 FOR IP): Performed by: INTERNAL MEDICINE

## 2022-04-20 PROCEDURE — 2580000003 HC RX 258: Performed by: PHYSICIAN ASSISTANT

## 2022-04-20 PROCEDURE — 99239 HOSP IP/OBS DSCHRG MGMT >30: CPT | Performed by: INTERNAL MEDICINE

## 2022-04-20 PROCEDURE — C9113 INJ PANTOPRAZOLE SODIUM, VIA: HCPCS | Performed by: PHYSICIAN ASSISTANT

## 2022-04-20 PROCEDURE — 36592 COLLECT BLOOD FROM PICC: CPT

## 2022-04-20 PROCEDURE — 83735 ASSAY OF MAGNESIUM: CPT

## 2022-04-20 PROCEDURE — 6360000002 HC RX W HCPCS: Performed by: INTERNAL MEDICINE

## 2022-04-20 RX ORDER — INSULIN LISPRO 100 [IU]/ML
0-12 INJECTION, SOLUTION INTRAVENOUS; SUBCUTANEOUS
Qty: 1 EACH | Refills: 1 | Status: SHIPPED | OUTPATIENT
Start: 2022-04-20

## 2022-04-20 RX ORDER — INSULIN LISPRO 100 [IU]/ML
0-6 INJECTION, SOLUTION INTRAVENOUS; SUBCUTANEOUS NIGHTLY
Qty: 1 EACH | Refills: 1 | Status: SHIPPED | OUTPATIENT
Start: 2022-04-20

## 2022-04-20 RX ORDER — INSULIN LISPRO 100 [IU]/ML
7 INJECTION, SOLUTION INTRAVENOUS; SUBCUTANEOUS
Status: DISCONTINUED | OUTPATIENT
Start: 2022-04-20 | End: 2022-04-20 | Stop reason: HOSPADM

## 2022-04-20 RX ORDER — LINEZOLID 600 MG/1
600 TABLET, FILM COATED ORAL EVERY 12 HOURS SCHEDULED
Status: DISCONTINUED | OUTPATIENT
Start: 2022-04-20 | End: 2022-04-20 | Stop reason: HOSPADM

## 2022-04-20 RX ORDER — INSULIN GLARGINE 100 [IU]/ML
38 INJECTION, SOLUTION SUBCUTANEOUS DAILY
Status: DISCONTINUED | OUTPATIENT
Start: 2022-04-21 | End: 2022-04-20 | Stop reason: HOSPADM

## 2022-04-20 RX ORDER — INSULIN LISPRO 100 [IU]/ML
7 INJECTION, SOLUTION INTRAVENOUS; SUBCUTANEOUS
Qty: 1 EACH | Refills: 1 | Status: SHIPPED | OUTPATIENT
Start: 2022-04-20

## 2022-04-20 RX ORDER — INSULIN LISPRO 100 [IU]/ML
0-6 INJECTION, SOLUTION INTRAVENOUS; SUBCUTANEOUS NIGHTLY
Status: DISCONTINUED | OUTPATIENT
Start: 2022-04-20 | End: 2022-04-20 | Stop reason: HOSPADM

## 2022-04-20 RX ORDER — NICOTINE POLACRILEX 4 MG
15 LOZENGE BUCCAL PRN
Qty: 45 G | Refills: 1 | Status: SHIPPED | OUTPATIENT
Start: 2022-04-20

## 2022-04-20 RX ORDER — INSULIN LISPRO 100 [IU]/ML
0-12 INJECTION, SOLUTION INTRAVENOUS; SUBCUTANEOUS
Status: DISCONTINUED | OUTPATIENT
Start: 2022-04-20 | End: 2022-04-20 | Stop reason: HOSPADM

## 2022-04-20 RX ORDER — INSULIN GLARGINE 100 [IU]/ML
38 INJECTION, SOLUTION SUBCUTANEOUS DAILY
Qty: 10 ML | Refills: 3 | Status: SHIPPED | OUTPATIENT
Start: 2022-04-21

## 2022-04-20 RX ADMIN — INSULIN LISPRO 6 UNITS: 100 INJECTION, SOLUTION INTRAVENOUS; SUBCUTANEOUS at 12:35

## 2022-04-20 RX ADMIN — PIPERACILLIN AND TAZOBACTAM 3375 MG: 3; .375 INJECTION, POWDER, LYOPHILIZED, FOR SOLUTION INTRAVENOUS at 03:01

## 2022-04-20 RX ADMIN — VANCOMYCIN HYDROCHLORIDE 1250 MG: 10 INJECTION, POWDER, LYOPHILIZED, FOR SOLUTION INTRAVENOUS at 11:51

## 2022-04-20 RX ADMIN — INSULIN LISPRO 6 UNITS: 100 INJECTION, SOLUTION INTRAVENOUS; SUBCUTANEOUS at 03:00

## 2022-04-20 RX ADMIN — INSULIN LISPRO 4 UNITS: 100 INJECTION, SOLUTION INTRAVENOUS; SUBCUTANEOUS at 10:35

## 2022-04-20 RX ADMIN — INSULIN GLARGINE 30 UNITS: 100 INJECTION, SOLUTION SUBCUTANEOUS at 09:02

## 2022-04-20 RX ADMIN — DEXTROSE AND SODIUM CHLORIDE: 5; 900 INJECTION, SOLUTION INTRAVENOUS at 06:12

## 2022-04-20 RX ADMIN — SODIUM CHLORIDE, PRESERVATIVE FREE 10 ML: 5 INJECTION INTRAVENOUS at 09:01

## 2022-04-20 RX ADMIN — INSULIN LISPRO 7 UNITS: 100 INJECTION, SOLUTION INTRAVENOUS; SUBCUTANEOUS at 12:36

## 2022-04-20 RX ADMIN — PANTOPRAZOLE SODIUM 40 MG: 40 INJECTION, POWDER, FOR SOLUTION INTRAVENOUS at 09:01

## 2022-04-20 RX ADMIN — PIPERACILLIN AND TAZOBACTAM 3375 MG: 3; .375 INJECTION, POWDER, LYOPHILIZED, FOR SOLUTION INTRAVENOUS at 11:01

## 2022-04-20 RX ADMIN — SODIUM CHLORIDE 25 ML: 9 INJECTION, SOLUTION INTRAVENOUS at 07:41

## 2022-04-20 RX ADMIN — POTASSIUM PHOSPHATE, MONOBASIC POTASSIUM PHOSPHATE, DIBASIC 10 MMOL: 224; 236 INJECTION, SOLUTION, CONCENTRATE INTRAVENOUS at 07:40

## 2022-04-20 RX ADMIN — HYDROMORPHONE HYDROCHLORIDE 0.25 MG: 1 INJECTION, SOLUTION INTRAMUSCULAR; INTRAVENOUS; SUBCUTANEOUS at 03:51

## 2022-04-20 RX ADMIN — Medication: at 09:01

## 2022-04-20 RX ADMIN — INSULIN LISPRO 4 UNITS: 100 INJECTION, SOLUTION INTRAVENOUS; SUBCUTANEOUS at 06:38

## 2022-04-20 ASSESSMENT — PAIN SCALES - GENERAL
PAINLEVEL_OUTOF10: 0

## 2022-04-20 NOTE — PROGRESS NOTES
[address] [place] [address] [facility]  Progress Note      Chief Complaint   Patient presents with    Fatigue     confusion, R foot black    Altered Mental Status     in bed x several days, not taking meds, R foot black and blistered,unsure duration       SUBJECTIVE:  Patient is tolerating medications. No reported adverse drug reactions. No nausea, vomiting, diarrhea. Status post AKA on 2022  Lethargic postop  Afebrile room air  No sedation or pressors at this time      Review of systems:  As stated above in the chief complaint, otherwise negative. Medications:  Scheduled Meds:   insulin lispro  7 Units SubCUTAneous TID     [START ON 2022] insulin glargine  38 Units SubCUTAneous Daily    insulin lispro  0-12 Units SubCUTAneous TID WC    insulin lispro  0-6 Units SubCUTAneous Nightly    atorvastatin  40 mg Oral Nightly    aspirin  81 mg Oral Daily    sodium chloride flush  10 mL IntraVENous 2 times per day    vitamin D  50,000 Units Oral Weekly    clopidogrel  75 mg Oral Daily    sodium hypochlorite   Irrigation Daily     Continuous Infusions:   dextrose 5 % and 0.9 % NaCl 100 mL/hr at 22 1245    sodium chloride      dextrose      sodium chloride      sodium chloride Stopped (22 1002)     PRN Meds:sodium chloride, glucose, glucagon (rDNA), dextrose, sodium chloride flush, sodium chloride, polyethylene glycol, acetaminophen **OR** acetaminophen, magnesium sulfate, dextrose bolus (hypoglycemia) **OR** dextrose bolus (hypoglycemia), HYDROmorphone **OR** HYDROmorphone, sodium chloride flush    OBJECTIVE:  /65   Pulse 69   Temp 97.8 °F (36.6 °C) (Oral)   Resp 16   Ht 5' 3\" (1.6 m)   Wt 172 lb 9.9 oz (78.3 kg)   SpO2 98%   BMI 30.58 kg/m²   Temp  Av.6 °F (36.4 °C)  Min: 97 °F (36.1 °C)  Max: 98.6 °F (37 °C)  Constitutional: The patient opens her eyes to deep verbal stimulation; lethargic  Skin: Warm and dry. No rashes were noted.    HEENT: Round Date    BLOODCULT2 24 Hours no growth 04/17/2022    ORG MRSA nasal colonization 04/18/2022    ORG Staphylococcus species 04/17/2022     No results found for: WNDABS  No results found for: RESPSMEAR  No results found for: MPNEUMO, CLAMYDCU, LABLEGI, AFBCX, FUNGSM, LABFUNG  No results found for: CULTRESP  No results found for: CXCATHTIP  No results found for: BFCS  Culture Surgical   Date Value Ref Range Status   04/17/2022 Culture in progress (A)  Preliminary   04/17/2022   Preliminary    Rare growth  Identification and sensitivity to follow       Urine Culture, Routine   Date Value Ref Range Status   06/24/2016 Growth not present  Final     No results found for: [facility]    ASSESSMENT:  · Gangrene of right lower extremity; status post amputation AKA on the right on 4/18/2022  · After disarticulation of the native stroke currently material as described by vascular  · Leukocytosis related to the above    PLAN:  · Continue perioperative antibiotics-further debridement necessary  · Check final cultures  · Monitor labs    Shahrzad Velazquez MD  2:32 PM  4/20/2022

## 2022-04-20 NOTE — PLAN OF CARE
Problem: Skin Integrity:  Goal: Will show no infection signs and symptoms  Description: Will show no infection signs and symptoms  4/20/2022 1240 by Mile Zayas RN  Outcome: Progressing  4/20/2022 0140 by Dahiana Clemons RN  Outcome: Met This Shift  Goal: Absence of new skin breakdown  Description: Absence of new skin breakdown  4/20/2022 1240 by Mile Zayas RN  Outcome: Progressing  4/20/2022 0140 by Dahiana Clemons RN  Outcome: Met This Shift     Problem: Falls - Risk of:  Goal: Will remain free from falls  Description: Will remain free from falls  4/20/2022 1240 by Mile Zayas RN  Outcome: Progressing  4/20/2022 0140 by Dahiana Clemons RN  Outcome: Met This Shift  Goal: Absence of physical injury  Description: Absence of physical injury  4/20/2022 1240 by Mile Zayas RN  Outcome: Progressing  4/20/2022 0140 by Dahiana Clemons RN  Outcome: Met This Shift     Problem: Infection - Surgical Site:  Goal: Will show no infection signs and symptoms  Description: Will show no infection signs and symptoms  4/20/2022 1240 by Mile Zayas RN  Outcome: Progressing  4/20/2022 0140 by Dahiana Clemons RN  Outcome: Met This Shift

## 2022-04-20 NOTE — PLAN OF CARE
Problem: Skin Integrity:  Goal: Will show no infection signs and symptoms  Description: Will show no infection signs and symptoms  Outcome: Met This Shift  Goal: Absence of new skin breakdown  Description: Absence of new skin breakdown  4/20/2022 0140 by Rufus Muñoz RN  Outcome: Met This Shift  4/19/2022 1524 by Emir Roberts  Outcome: Met This Shift     Problem: Falls - Risk of:  Goal: Will remain free from falls  Description: Will remain free from falls  4/20/2022 0140 by Rufus Muñoz RN  Outcome: Met This Shift  4/19/2022 1524 by Emir Roberts  Outcome: Met This Shift  Goal: Absence of physical injury  Description: Absence of physical injury  Outcome: Met This Shift     Problem: Infection - Surgical Site:  Goal: Will show no infection signs and symptoms  Description: Will show no infection signs and symptoms  Outcome: Met This Shift     Problem: Discharge Planning:  Goal: Participates in care planning  Description: Participates in care planning  Outcome: Met This Shift  Goal: Discharged to appropriate level of care  Description: Discharged to appropriate level of care  Outcome: Met This Shift     Problem: Airway Clearance - Ineffective:  Goal: Ability to maintain a clear airway will improve  Description: Ability to maintain a clear airway will improve  4/20/2022 0140 by Rufus Muñoz RN  Outcome: Met This Shift  4/19/2022 1524 by Emir Roberts  Outcome: Met This Shift     Problem: Anxiety/Stress:  Goal: Level of anxiety will decrease  Description: Level of anxiety will decrease  Outcome: Met This Shift     Problem: Aspiration:  Goal: Absence of aspiration  Description: Absence of aspiration  4/20/2022 0140 by Rufus Muñoz RN  Outcome: Met This Shift  4/19/2022 1524 by Emir Roberts  Outcome: Met This Shift     Problem:  Bowel Function - Altered:  Goal: Bowel elimination is within specified parameters  Description: Bowel elimination is within specified parameters  Outcome: Met This Shift Problem: Cardiac Output - Decreased:  Goal: Hemodynamic stability will improve  Description: Hemodynamic stability will improve  Outcome: Met This Shift     Problem: Fluid Volume - Imbalance:  Goal: Absence of imbalanced fluid volume signs and symptoms  Description: Absence of imbalanced fluid volume signs and symptoms  Outcome: Met This Shift     Problem: Gas Exchange - Impaired:  Goal: Levels of oxygenation will improve  Description: Levels of oxygenation will improve  4/20/2022 0140 by Elian Rosales RN  Outcome: Met This Shift  4/19/2022 1524 by Ashyln Kearns  Outcome: Met This Shift     Problem: Mental Status - Impaired:  Goal: Mental status will be restored to baseline  Description: Mental status will be restored to baseline  4/20/2022 0140 by Elian Rosales RN  Outcome: Met This Shift  4/19/2022 1524 by Ashlyn Kearns  Outcome: Ongoing     Problem: Nutrition Deficit:  Goal: Ability to achieve adequate nutritional intake will improve  Description: Ability to achieve adequate nutritional intake will improve  Outcome: Met This Shift     Problem: Pain:  Goal: Pain level will decrease  Description: Pain level will decrease  Outcome: Met This Shift  Goal: Recognizes and communicates pain  Description: Recognizes and communicates pain  Outcome: Met This Shift  Goal: Control of acute pain  Description: Control of acute pain  4/20/2022 0140 by Elian Rosales RN  Outcome: Met This Shift  4/19/2022 1524 by Ashlyn Kearns  Outcome: Met This Shift  Goal: Control of chronic pain  Description: Control of chronic pain  Outcome: Met This Shift     Problem: Serum Glucose Level - Abnormal:  Goal: Ability to maintain appropriate glucose levels will improve to within specified parameters  Description: Ability to maintain appropriate glucose levels will improve to within specified parameters  Outcome: Met This Shift     Problem: Skin Integrity - Impaired:  Goal: Will show no infection signs and symptoms  Description: Will show no infection signs and symptoms  Outcome: Met This Shift  Goal: Absence of new skin breakdown  Description: Absence of new skin breakdown  Outcome: Met This Shift     Problem: Sleep Pattern Disturbance:  Goal: Appears well-rested  Description: Appears well-rested  Outcome: Met This Shift     Problem: Tissue Perfusion, Altered:  Goal: Circulatory function within specified parameters  Description: Circulatory function within specified parameters  Outcome: Met This Shift     Problem: Tissue Perfusion - Cardiopulmonary, Altered:  Goal: Absence of angina  Description: Absence of angina  Outcome: Met This Shift  Goal: Hemodynamic stability will improve  Description: Hemodynamic stability will improve  Outcome: Met This Shift

## 2022-04-20 NOTE — CARE COORDINATION
Social work / Discharge Planning:         POD #2 R AKA. [facility] continues to follow for admission. GRADY initiated. Social work spoke to the patient's spouse Juliet and discussed discharge planning / transition of care. She is in agreement for LTAC. All questions answered.    Electronically signed by ROLANDA Ray on 4/20/2022 at 11:09 AM

## 2022-04-20 NOTE — PROGRESS NOTES
Vascular, Infectious disease, and endocrinology paged to see if okay for patient to transfer to [facility].

## 2022-04-20 NOTE — DISCHARGE INSTR - COC
Continuity of Care Form    Patient Name: Gisel Haile   :  1960  MRN:  [id number]    Admit date:  2022  Discharge date:  22    Code Status Order: Full Code   Advance Directives:      Admitting Physician:  David Crowe DO  PCP: Elpidio Fletcher DO    Discharging Nurse: ABDI GUEVARA [facility] CENTER Unit/Room#: 0206/0206-A  Discharging Unit Phone Number: [phone]    Emergency Contact:   Extended Emergency Contact Information  Primary Emergency Contact: JuanitoJuliet knox  Address: [address] [place] [address] [address]  Phone: [phone]  Relation: Spouse    Past Surgical History:  Past Surgical History:   Procedure Laterality Date    FOOT DEBRIDEMENT N/A 2022    right foot ulcer deep wound cultures  performed by Kenisha Lucas DPM at [address] [place] [address] Right 2022    GUILLOTINE RIGHT ABOVE KNEE AMPUTATION performed by Ofe Hammond MD at [address] [place] [address] Left 2016       Immunization History:   Immunization History   Administered Date(s) Administered    COVID-19, [employer], Primary or Immunocompromised, PF, 100mcg/0.5mL 2021    Tdap (Boostrix, Adacel) 2020       Active Problems:  Patient Active Problem List   Diagnosis Code    Cancer of kidney ([facility] C64.9    Vasovagal syncope R55    Type 2 diabetes mellitus without complication, without long-term current use of insulin ([facility] E11.9    Vitamin D deficiency E55.9    Mixed hyperlipidemia E78.2    Neuropathy G62.9    Light headedness R42    Neurologic gait dysfunction R26.9    Lumbar degenerative disc disease M51.36    Numbness and tingling R20.0, R20.2    Postural dizziness R42    Multiple lacunar infarcts ([address] I63.81    DDD (degenerative disc disease), cervical M50.30    Bone marrow edema R93.7    Hyperglycemia due to diabetes mellitus ([address] E11.65    Cerebral infarct (HCC) I63.9    Acute CVA (cerebrovascular accident) ([facility] I63.9 Necrotizing fasciitis (HCC) M72.6    Cellulitis in diabetic foot ([facility] E11.628, L03.119    Necrotizing cellulitis L03.90       Isolation/Infection:   Isolation            No Isolation          Patient Infection Status       Infection Onset Added Last Indicated Last Indicated By Review Planned Expiration Resolved Resolved By    MRSA 04/18/22 04/19/22 04/18/22 Culture, MRSA, Screening        MRSA nares 4/28/2022            Nurse Assessment:  Last Vital Signs: /61   Pulse 72   Temp 97.8 °F (36.6 °C) (Oral)   Resp 16   Ht 5' 3\" (1.6 m)   Wt 172 lb 9.9 oz (78.3 kg)   SpO2 97%   BMI 30.58 kg/m²     Last documented pain score (0-10 scale): Pain Level: 0  Last Weight:   Wt Readings from Last 1 Encounters:   04/20/22 172 lb 9.9 oz (78.3 kg)     Mental Status:  {IP PT MENTAL STATUS:20030}    IV Access:  - Central Venous Catheter  - site  right and internal jugular, insertion date: 4/18/22    Nursing Mobility/ADLs:  Walking   Dependent  Transfer  Dependent  Bathing  Dependent  Dressing  Dependent  Toileting  Dependent  Feeding  Assisted  Med Admin  Assisted  Med Delivery   none    Wound Care Documentation and Therapy:  Incision 09/27/16 Abdomen (Active)   Number of days: 2030       Incision 09/27/16 Abdomen Left (Active)   Number of days: 2030       Incision 04/17/22 Foot Anterior;Right (Active)   Dressing Status Clean;Dry; Intact 04/18/22 1645   Dressing/Treatment Gauze dressing/dressing sponge;Xeroform; Ace wrap 04/18/22 1645   Drainage Amount None 04/18/22 1645   Odor Malodorous/putrid 04/18/22 1645   Number of days: 3       Incision 04/18/22 Knee Right (Active)   Dressing Status Clean;Dry; Intact 04/20/22 0800   Dressing Change Due 04/21/22 04/20/22 0800   Incision Cleansed Other (Comment) 04/19/22 1200   Dressing/Treatment Other (comment) 04/20/22 0800   Incision Assessment Other (Comment) 04/20/22 0800   Drainage Amount Moderate 04/19/22 1200   Drainage Description Serosanguinous 04/19/22 1200   Odor Moderate 04/19/22 0500   Elle-incision Assessment Other (Comment) 04/20/22 0800   Number of days: 2        Elimination:  Continence: Bowel: ***  Bladder: Yes  Urinary Catheter: Insertion Date: 4/17/22    Colostomy/Ileostomy/Ileal Conduit: No       Date of Last BM: 4/18/22    Intake/Output Summary (Last 24 hours) at 4/20/2022 1115  Last data filed at 4/20/2022 0800  Gross per 24 hour   Intake 3615.34 ml   Output 1425 ml   Net 2190.34 ml     I/O last 3 completed shifts: In: 7265.7 [I.V.:5011.7; Blood:440; IV PZYRWOCCN:0627]  Out: 4717 [ZBPBL:4601]    Safety Concerns: At Risk for Falls    Impairments/Disabilities:      None above the knee amputation right     Nutrition Therapy:  Current Nutrition Therapy:   - Oral Diet:  Carb Control 3 carbs/meal (1500kcals/day)    Routes of Feeding: Oral  Liquids: No restrictions   Daily Fluid Restriction: no  Last Modified Barium Swallow with Video (Video Swallowing Test): not done    Treatments at the Time of Hospital Discharge:   Respiratory Treatments: na  Oxygen Therapy:  is not on home oxygen therapy.   Ventilator:    - No ventilator support    Rehab Therapies: Physical Therapy and Occupational Therapy  Weight Bearing Status/Restrictions: No weight bearing restrictions  Other Medical Equipment (for information only, NOT a DME order):  none  Other Treatments: ***    Patient's personal belongings (please select all that are sent with patient):  Shirt and pants     RN SIGNATURE:  Electronically signed by Fan Gibson RN on 4/20/22 at 11:41 AM EDT    CASE MANAGEMENT/SOCIAL WORK SECTION    Inpatient Status Date: ***    Readmission Risk Assessment Score:  Readmission Risk              Risk of Unplanned Readmission:  20           Discharging to Facility/ Agency   Name: [facility]  [address] [place] [address]  OVEAY:[phone]  Fax:    Dialysis Facility (if applicable)   Name:  Address:  Dialysis Schedule:  Phone:  Fax:    / signature: Electronically signed by ROLANDA Dee on 4/20/22 at 11:18 AM EDT    PHYSICIAN SECTION    Prognosis: {Prognosis:1288001057}    Condition at Discharge: Stable    Rehab Potential (if transferring to Rehab): {Prognosis:1302197968}    Recommended Labs or Other Treatments After Discharge: ***    Physician Certification: I certify the above information and transfer of Iker Aguirre  is necessary for the continuing treatment of the diagnosis listed and that she requires LTAC for less 30 days.      Update Admission H&P: {CHP DME Changes in TNYLM:423337262}    PHYSICIAN SIGNATURE:  {Esignature:[id number]}

## 2022-04-20 NOTE — PATIENT CARE CONFERENCE
Intensive Care Daily Quality Rounding Checklist        ICU Team Members: Dr. Cristina Castillo, residents, charge nurse, bedside nurse, clinical pharmacist and respiratory therapy     ICU Day #: 3     Intubation Date: N/A     Ventilator Day #: N/A     Central Line Insertion Date: 4/18/22                                                    Day #: 3      Arterial Line Insertion Date: N/A                             Day #: N/A     Temporary Hemodialysis Catheter Insertion Date: N/A                             Day #: N/A     DVT Prophylaxis: plavix    GI Prophylaxis: protonix     Mathews Catheter Insertion Date: 4/17                                        Day #: 4                             Continued need (if yes, reason documented and discussed with physician):Yes, accurate intake and output in a critically ill patient.     Skin Issues/ Wounds and ordered treatment discussed on rounds: Yes - treatments in place     Goals/ Plans for the Day: Monitor labs and vitals.  Bedside swallow and [facility] to [facility]

## 2022-04-20 NOTE — DISCHARGE SUMMARY
[facility] Physician Discharge Summary     No follow-up provider specified. Activity level   As per therapy    Disposition   LTAC      Condition on discharge Stable    Patient ID   Karena Boys, 57 y. o.female /  1960  / MRN [id number]    Admit date   2022    Discharge date  2022  12:01 PM    Admission diagnoses Principal Problem:    Necrotizing fasciitis [facility])  Active Problems:    Cellulitis in diabetic foot ([address]    Necrotizing cellulitis  Resolved Problems:    * No resolved hospital problems. *    Discharge diagnoses Same    Consults   IP CONSULT TO PODIATRY  IP CONSULT TO VASCULAR SURGERY  IP CONSULT TO CRITICAL CARE  IP CONSULT TO VASCULAR SURGERY  IP CONSULT TO INFECTIOUS DISEASES  IP CONSULT TO PHARMACY  IP CONSULT TO ENDOCRINOLOGY  IP CONSULT TO ENDOCRINOLOGY    Procedures   See hospital course    Hospital Course  Maryam Silva is a 69-year-old female with past medical history of non-insulin-dependent diabetes, multiple prior strokes as recent as this past August who was admitted late evening of  with a necrotic wound of her right foot and encephalopathy likely secondary to infection. Vancomycin, cefepime, clindamycin given in ED. Infectious disease consulted, continued vancomycin and Zosyn. Endocrinology was consulted for management of the patient's diabetes. In the early morning of , patient was taken to the operating room with podiatry for extensive debridement of the right foot. Patient was subsequently moved to intensive care unit for close monitoring. Vascular surgery was consulted given podiatry's concerns that she may require amputation, imaging was obtained and patient ultimately underwent right above-the-knee amputation on the afternoon of . She did have a blood loss anemia requiring transfusion of 1 unit of packed red blood cells. She had remained hemodynamically stable after the procedure, not requiring any vasopressor therapy.   She did develop significant hyperglycemia/borderline DKA and required insulin infusion for night, again at the discretion of endocrinology, though blood sugars are now more well controlled. Mental status postoperatively was relatively poor, CT of the head obtained on 4/19 showed multiple old CVAs but no acute pathology. She is to finish perioperative antibiotics per the infectious disease team today. She was referred to [facility] and has been accepted there, and I feel she is stable for this transition today. Discharge Exam  /61   Pulse 72   Temp 97.8 °F (36.6 °C) (Oral)   Resp 16   Ht 5' 3\" (1.6 m)   Wt 172 lb 9.9 oz (78.3 kg)   SpO2 97%   BMI 30.58 kg/m²   General Appearance: alert and oriented to person, place and time and in no acute distress  Skin: warm and dry  Head: normocephalic and atraumatic  Eyes: pupils equal, round, and reactive to light, extraocular eye movements intact, conjunctivae normal  Neck: neck supple and non tender without mass   Pulmonary/Chest: clear to auscultation bilaterally- no wheezes, rales or rhonchi, normal air movement, no respiratory distress  Cardiovascular: normal rate, normal S1 and S2 and no carotid bruits  Abdomen: soft, non-tender, non-distended, normal bowel sounds, no masses or organomegaly  Extremities: no cyanosis, no clubbing and no edema, s/p R AKA  Neurologic: no cranial nerve deficit, lethargic    I/O last 3 completed shifts: In: 7265.7 [I.V.:5011.7;  Blood:440; IV RSSOEWIWV:9835]  Out: 9589 [XOQOD:0405]  I/O this shift:  In: -   Out: 150 [Urine:150]    Labs  Recent Labs     04/18/22  0250 04/18/22  0520 04/19/22  0310 04/19/22  0500 04/19/22  0500 04/19/22  0645 04/19/22  1100 04/19/22  1420 04/19/22  2205 04/20/22  0637   NA  --    < >   < >  --   --  128* 128*  --   --  134   K  --    < >   < >  --   --  4.5 4.1  --   --  3.8   CL  --    < >   < >  --   --  101 100  --   --  103   CO2  --    < >   < >  --   --  17* 18*  --   --  23 BUN  --    < >   < >  --   --  34* 32*  --   --  25*   CREATININE  --    < >   < >  --   --  1.0 1.1*  --   --  0.9   GLUCOSE  --    < >   < >  --   --  160* 123*  --   --  236*   CALCIUM  --    < >   < >  --   --  7.6* 7.7*  --   --  7.8*   WBC 60.2*  --   --  27.2*  --   --   --   --   --  30.2*   RBC 3.61  --   --  2.55*  --   --   --   --   --  2.57*   HGB 11.0*   < >  --  7.7*   < >  --   --  7.1* 8.2* 7.6*   HCT 33.5*   < >  --  23.8*   < >  --   --  21.5* 24.5* 22.9*   MCV 92.8  --   --  93.3  --   --   --   --   --  89.1   MCH 30.5  --   --  30.2  --   --   --   --   --  29.6   MCHC 32.8  --   --  32.4  --   --   --   --   --  33.2   RDW 14.6  --   --  14.6  --   --   --   --   --  15.9*     --   --  210  --   --   --   --   --  203   MPV 10.6  --   --  10.4  --   --   --   --   --  10.9    < > = values in this interval not displayed. Imaging  XR TIBIA FIBULA RIGHT (2 VIEWS)    Result Date: 4/17/2022  Extensive soft tissue swelling and gas throughout the visualized right lower extremity, concerning for gas forming infection and necrotizing fasciitis. XR FOOT RIGHT (MIN 3 VIEWS)    Result Date: 4/17/2022  Extensive soft tissue swelling and gas throughout the visualized right lower extremity, concerning for gas forming infection and necrotizing fasciitis. CT HEAD WO CONTRAST    Result Date: 4/19/2022  1. Old infarcts within the right temporal and right parietal lobes 2. There is no intracranial hemorrhage 3. Old infarcts within the right and left basal ganglia as well as within the right thalamus. XR CHEST PORTABLE    Result Date: 4/18/2022  No acute cardiopulmonary abnormality. Right central venous catheter projects over the SVC. No pneumothorax. XR CHEST PORTABLE    Result Date: 4/17/2022  No acute process.      CT FEMUR RIGHT WO CONTRAST    Result Date: 4/18/2022  Extensive soft tissue gas on the dorsal aspect of distal thigh, entire calf and ankle and medial aspect of the foot. Findings suspicious for osteomyelitis involving the tibial plafond and medial aspect of the medial cuneiform. MRI may be obtained to confirm. The findings were sent to the Radiology Results [address] at 12:53 pm on 4/18/2022to be communicated to a licensed caregiver. CT TIBIA FIBULA RIGHT WO CONTRAST    Result Date: 4/18/2022  Extensive soft tissue gas on the dorsal aspect of distal thigh, entire calf and ankle and medial aspect of the foot. Findings suspicious for osteomyelitis involving the tibial plafond and medial aspect of the medial cuneiform. MRI may be obtained to confirm. The findings were sent to the Radiology Results [address] at 12:53 pm on 4/18/2022to be communicated to a licensed caregiver. VL LOWER EXTREMITY ARTERIAL SEGMENTAL PRESSURES W PPG    Result Date: 4/18/2022  1. Abnormal ankle brachial indices bilaterally at 0.5 on the right and 0.6 on the left. Dampened but preserved biphasic waveforms at all levels bilaterally. Changes probably secondary to 2 advanced underlying peripheral vascular disease. 2. PVR evaluation of the bilateral lower extremities from the calf to the ankles demonstrates irregular but preserved pulsatility with relatively preserved amplitudes. Non pulsatility in the right metatarsal.  Digital waveform evaluation of the digits of the bilateral feet were markedly abnormal.  On the right there is non pulsatility in all digits. On the left there is non pulsatility in digits 3 through 5 with preserved pulsatility but severely decreased amplitudes in digits 1 and 2. These findings are most likely secondary to advanced underlying microvascular disease. At this time there is poor collateral flow to the digits of the bilateral feet right worse than left.      Patient Instructions     Medication List      CONTINUE taking these medications    acetaminophen 325 MG tablet  Commonly known as: TYLENOL     aspirin 81 MG EC tablet     atorvastatin 40 MG tablet  Commonly known as: LIPITOR  Take 1 tablet by mouth nightly     clopidogrel 75 MG tablet  Commonly known as: PLAVIX  Take 1 tablet by mouth daily     vitamin D 1.25 MG (29898 UT) Caps capsule  Commonly known as: ERGOCALCIFEROL  Take 1 capsule by mouth once a week        ASK your doctor about these medications    glipiZIDE 5 MG tablet  Commonly known as: GLUCOTROL  Take 1 tablet by mouth 2 times daily (before meals)          Note that more than 30 minutes was spent in preparing discharge papers, discussing discharge with patient, medication review, etc.    Electronically signed by Wayne Garcia DO on 4/20/2022 at 12:01 PM

## 2022-04-20 NOTE — PROGRESS NOTES
Pharmacy Consultation Note  (Antibiotic Dosing and Monitoring)    Initial consult date: 4/18/2022  Consulting physician/provider: Dr. Demarco Rice  Drug: Vancomycin  Indication: Necrotizing fasciitis with gangrene of the right lower extremity      Age/  Gender Height Weight IBW  Allergy Information   61 y.o./female 5' 3\" (160 cm) 188 lb (85.3 kg)     Ideal body weight: 52.4 kg (115 lb 8.3 oz)  Adjusted ideal body weight: 62.8 kg (138 lb 5.8 oz)   Codeine and Pyridium [phenazopyridine hcl]      Renal Function:  Recent Labs     04/19/22  0645 04/19/22  1100 04/20/22  0637   BUN 34* 32* 25*   CREATININE 1.0 1.1* 0.9       Intake/Output Summary (Last 24 hours) at 4/20/2022 1138  Last data filed at 4/20/2022 0800  Gross per 24 hour   Intake 3615.34 ml   Output 1425 ml   Net 2190.34 ml       Vancomycin Monitoring:  Trough:  No results for input(s): VANCOTROUGH in the last 72 hours. Random:    Recent Labs     04/19/22  0500 04/20/22  0637   VANCORANDOM 22.3 22.4       Vancomycin Administration Times:  Recent vancomycin administrations                   vancomycin 1500 mg in dextrose 5% 300 mL IVPB (mg) 1,500 mg New Bag 04/19/22 1156     1,500 mg New Bag 04/18/22 1146    vancomycin 1500 mg in dextrose 5% 300 mL IVPB (mg) 1,500 mg New Bag 04/18/22 0220                Assessment:  · Patient is a 57 y.o. female who has been initiated on vancomycin  Estimated Creatinine Clearance: 64 mL/min (based on SCr of 0.9 mg/dL).   · To dose vancomycin, pharmacy will be utilizing [employer] calculation software for goal AUC/-600 mg/L-hr    Plan:  · Will adjust dose to vancomycin 1250 mg IV every 24 hours  · Will check vancomycin levels when appropriate  · Will continue to monitor renal function   · Clinical pharmacy to follow    Silvano Payne PharmD, BCCCP  4/20/2022  11:38 AM

## 2022-04-20 NOTE — PROGRESS NOTES
Spouse Juliet notified of patient transferring to [facility] room 730. Answered any questions at this time.

## 2022-04-20 NOTE — PROGRESS NOTES
oz (78.3 kg)   SpO2 97%   BMI 30.58 kg/m²   General: well-developed, well-nourished, no acute distress, cooperative  Skin: warm, dry, intact, normal color without cyanosis  HEENT: normocephalic, atraumatic, mucous membranes normal  Respiratory: clear to auscultation bilaterally without respiratory distress  Cardiovascular: regular rate and rhythm without murmur / rub / gallop  Abdominal: soft, nontender, nondistended, normoactive bowel sounds  Extremities: no mottling, pulses intact, no edema, s/p R AKA  Neurologic: obtunded  Psychiatric: unable to assess    Electronically signed by South Campos DO on 4/20/2022 at 10:07 AM

## 2022-04-20 NOTE — PROGRESS NOTES
Vascular Surgery Inpatient Progress Note  4/20/2022   Shaq Lillie       SUBJECTIVE: This is a 57 y.o. female s/p R guillotine knee disarticulation amputation (DOS: 4/18, POD#2) due to necrotizing fasciitis. Patient's dressing is c/d/i at the time of visit without strikethrough drainage or saturation. Patient denies nausea, vomiting, fevers, chills, shortness of breath, or chest pain at this time. Admits to pain and discomfort with dressing change and manipulation/positioning of RLE. No acute overnight events reported. OBJECTIVE:      Vitals:    04/20/22 1300   BP: 119/68   Pulse: 73   Resp: 20   Temp:    SpO2: 97%        EXAM:    RLE focused exam:  Guillotine amputation present to RLE; knee disarticulation. Gross bone exposure present. Posterior compartment with necrosis, purulence pictured below. No malodor noted. No active bleeding. Pain with repositioning and dressing change of RLE. Patient is able to move limb at level of hip to assist in positioning.                           Current Facility-Administered Medications   Medication Dose Route Frequency Provider Last Rate Last Admin    vancomycin (VANCOCIN) 1,250 mg in dextrose 5 % 250 mL IVPB  1,250 mg IntraVENous Q24H Madison Arredondo  mL/hr at 04/20/22 1245 Rate Verify at 04/20/22 1245    insulin lispro (HUMALOG) injection vial 7 Units  7 Units SubCUTAneous TID  Anastacia Mckee MD   7 Units at 04/20/22 1236    [START ON 4/21/2022] insulin glargine (LANTUS) injection vial 38 Units  38 Units SubCUTAneous Daily Jack Yost MD        insulin lispro (HUMALOG) injection vial 0-12 Units  0-12 Units SubCUTAneous TID  Anastacia Mckee MD   6 Units at 04/20/22 1235    insulin lispro (HUMALOG) injection vial 0-6 Units  0-6 Units SubCUTAneous Nightly Anastacia Mckee MD        dextrose 5 % and 0.9 % sodium chloride infusion   IntraVENous Continuous Lavern Claros  mL/hr at 04/20/22 1245 Rate Verify at 04/20/22 1245    0.9 % sodium chloride infusion   IntraVENous PRN Jere Quiroz MD        atorvastatin (LIPITOR) tablet 40 mg  40 mg Oral Nightly Reyna Ferrell PA-C        aspirin EC tablet 81 mg  81 mg Oral Daily Reyna Ferrell PA-C   81 mg at 04/18/22 1015    glucose (GLUTOSE) 40 % oral gel 15 g  15 g Oral PRN Reyna Ferrell PA-C        glucagon (rDNA) injection 1 mg  1 mg IntraMUSCular PRN Reyna Ferrell PA-C        dextrose 5 % solution  100 mL/hr IntraVENous PRN Reyna Ferrell PA-C        sodium chloride flush 0.9 % injection 10 mL  10 mL IntraVENous 2 times per day Reyna Ferrell PA-C   10 mL at 04/20/22 0901    sodium chloride flush 0.9 % injection 10 mL  10 mL IntraVENous PRN GIL Saez-C        0.9 % sodium chloride infusion   IntraVENous PRN Reyna Ferrell PA-C        polyethylene glycol (GLYCOLAX) packet 17 g  17 g Oral Daily PRN Reyna Ferrell PA-C        acetaminophen (TYLENOL) tablet 650 mg  650 mg Oral Q6H PRN Reyna Ferrell PA-C        Or    acetaminophen (TYLENOL) suppository 650 mg  650 mg Rectal Q6H PRN Reyna Ferrell PA-C        vitamin D (ERGOCALCIFEROL) capsule 50,000 Units  50,000 Units Oral Weekly Reyna Ferrell PA-C   50,000 Units at 04/18/22 1016    clopidogrel (PLAVIX) tablet 75 mg  75 mg Oral Daily Reyna Ferrell PA-C   75 mg at 04/18/22 1015    piperacillin-tazobactam (ZOSYN) 3,375 mg in dextrose 5 % 50 mL IVPB extended infusion (mini-bag)  3,375 mg IntraVENous Q8H Reyna Ferrell PA-C 12.5 mL/hr at 04/20/22 1245 Rate Verify at 04/20/22 1245    0.9 % sodium chloride infusion  25 mL IntraVENous Q8H Reyna Ferrell PA-C   Stopped at 04/20/22 1002    magnesium sulfate 1000 mg in dextrose 5% 100 mL IVPB  1,000 mg IntraVENous PRN Brandon Mcdonald PA-C   Stopped at 04/19/22 0542    dextrose bolus (hypoglycemia) 10% 125 mL  125 mL IntraVENous PRN Reyna Ferrell PA-C        Or    dextrose bolus (hypoglycemia) 10% 250 mL  250 mL IntraVENous PRN Sebastien Pulido PA-C        HYDROmorphone (DILAUDID) injection 0.25 mg  0.25 mg IntraVENous Q3H PRN Soni Ferrell PA-C   0.25 mg at 04/20/22 0351    Or    HYDROmorphone (DILAUDID) injection 0.5 mg  0.5 mg IntraVENous Q3H PRN Soni Ferrell PA-C   0.5 mg at 04/19/22 0505    sodium hypochlorite (DAKINS) 0.125 % external solution   Irrigation Daily Franc Shaw MD   Given at 04/20/22 0901    sodium chloride flush 0.9 % injection 10 mL  10 mL IntraVENous PRN Sebastien Pulido PA-C            Lab Results   Component Value Date    WBC 30.2 (H) 04/20/2022    HCT 22.9 (L) 04/20/2022    HGB 7.6 (L) 04/20/2022     04/20/2022     04/20/2022    K 3.8 04/20/2022     04/20/2022    CO2 23 04/20/2022    BUN 25 (H) 04/20/2022    CREATININE 0.9 04/20/2022    GLUCOSE 236 (H) 04/20/2022    CRP 61.2 (H) 04/17/2022         ASSESEMENT:  Principal Problem:    Necrotizing fasciitis - RLE, s/p R guillotine knee disarticulation amputation (DOS: 4/18, POD#2)       PLAN:  - Patient was seen and evaluated at bedside. Chart and labs reviewed. - Abx per ID - vanc, zosyn ongoing.  - Dressing to RLE changed at bedside consisting of gauze soaked in Dakins solution, DSD, light ACE. - Patient will need to return to OR for definitive AKA. Date/time of OR TBD.   - [facility] for [facility]. Will continue to follow/monitor. Patient was discussed with Josr Velez. Tess Yuan DPM   4/20/2022   1:16 PM       Agree. We will plan for formal right above-knee amputation Friday. Patient scheduled for noon in [facility] OR. Okay for discharge to [facility] from my standpoint in the meantime.

## 2022-04-20 NOTE — PROGRESS NOTES
Dressing change to right knee above the knee amputation completed by Dr. Dorothy Ross at bedside. New dressing clean, dry, and intact.

## 2022-04-20 NOTE — PROGRESS NOTES
Report called to Carri Charles RN at [facility].

## 2022-04-21 ENCOUNTER — ANESTHESIA EVENT (OUTPATIENT)
Dept: OPERATING ROOM | Age: 62
End: 2022-04-21
Payer: COMMERCIAL

## 2022-04-21 LAB
ANAEROBIC CULTURE: ABNORMAL
CULTURE SURGICAL: NORMAL
ORGANISM: ABNORMAL

## 2022-04-21 RX ORDER — OXYCODONE HYDROCHLORIDE 5 MG/1
5 TABLET ORAL
Status: CANCELLED | OUTPATIENT
Start: 2022-04-21 | End: 2022-04-21

## 2022-04-21 RX ORDER — DIPHENHYDRAMINE HYDROCHLORIDE 50 MG/ML
12.5 INJECTION INTRAMUSCULAR; INTRAVENOUS
Status: CANCELLED | OUTPATIENT
Start: 2022-04-21 | End: 2022-04-21

## 2022-04-21 RX ORDER — HALOPERIDOL 5 MG/ML
1 INJECTION INTRAMUSCULAR
Status: CANCELLED | OUTPATIENT
Start: 2022-04-21 | End: 2022-04-21

## 2022-04-21 RX ORDER — IPRATROPIUM BROMIDE AND ALBUTEROL SULFATE 2.5; .5 MG/3ML; MG/3ML
1 SOLUTION RESPIRATORY (INHALATION)
Status: CANCELLED | OUTPATIENT
Start: 2022-04-21 | End: 2022-04-21

## 2022-04-21 RX ORDER — POLYETHYLENE GLYCOL 3350 17 G/17G
17 POWDER, FOR SOLUTION ORAL DAILY PRN
COMMUNITY

## 2022-04-21 RX ORDER — LABETALOL HYDROCHLORIDE 5 MG/ML
10 INJECTION, SOLUTION INTRAVENOUS
Status: CANCELLED | OUTPATIENT
Start: 2022-04-21

## 2022-04-21 RX ORDER — SODIUM CHLORIDE 9 MG/ML
INJECTION, SOLUTION INTRAVENOUS PRN
Status: CANCELLED | OUTPATIENT
Start: 2022-04-21

## 2022-04-21 RX ORDER — MEPERIDINE HYDROCHLORIDE 25 MG/ML
12.5 INJECTION INTRAMUSCULAR; INTRAVENOUS; SUBCUTANEOUS EVERY 5 MIN PRN
Status: CANCELLED | OUTPATIENT
Start: 2022-04-21

## 2022-04-21 RX ORDER — SODIUM CHLORIDE 0.9 % (FLUSH) 0.9 %
5-40 SYRINGE (ML) INJECTION PRN
Status: CANCELLED | OUTPATIENT
Start: 2022-04-21

## 2022-04-21 RX ORDER — LORAZEPAM 2 MG/ML
0.5 INJECTION INTRAMUSCULAR
Status: CANCELLED | OUTPATIENT
Start: 2022-04-21 | End: 2022-04-21

## 2022-04-21 RX ORDER — ENOXAPARIN SODIUM 100 MG/ML
40 INJECTION SUBCUTANEOUS DAILY
COMMUNITY

## 2022-04-21 RX ORDER — SODIUM CHLORIDE 0.9 % (FLUSH) 0.9 %
5-40 SYRINGE (ML) INJECTION EVERY 12 HOURS SCHEDULED
Status: CANCELLED | OUTPATIENT
Start: 2022-04-21

## 2022-04-21 RX ORDER — HYDRALAZINE HYDROCHLORIDE 20 MG/ML
10 INJECTION INTRAMUSCULAR; INTRAVENOUS
Status: CANCELLED | OUTPATIENT
Start: 2022-04-21

## 2022-04-21 ASSESSMENT — LIFESTYLE VARIABLES: SMOKING_STATUS: 1

## 2022-04-21 NOTE — PROGRESS NOTES
Phone assessment completed with Arjun Azul, nurse on [facility]. Pt is alert and oriented x4. Signs for self. Pt is full code, not in any isolation, and has wound to right lower extremity. Pt has right arm midline.

## 2022-04-21 NOTE — PROGRESS NOTES
[facility] PRE-ADMISSION TESTING INSTRUCTIONS    The Preadmission Testing patient is instructed accordingly using the following criteria (check applicable):    ARRIVAL INSTRUCTIONS:  [x] Parking the day of Surgery is located in the Main Entrance lot. Upon entering the door, make an immediate right to the surgery reception desk    [x] Bring photo ID and insurance card    [] Bring in a copy of Living will or Durable Power of  papers. [x] Please be sure to arrange for responsible adult to provide transportation to and from the hospital    [x] Please arrange for responsible adult to be with you for the 24 hour period post procedure due to having anesthesia      GENERAL INSTRUCTIONS:    [x] Nothing by mouth after midnight, including gum, candy, mints or water    [x] You may brush your teeth, but do not swallow any water    [] Take medications as instructed with 1-2 oz of water    [] Stop herbal supplements and vitamins 5 days prior to procedure    [] Follow preop dosing of blood thinners per physician instructions    [x] Take 1/2 dose of evening insulin, but no insulin after midnight    [x] No oral diabetic medications after midnight    [x] If diabetic and have low blood sugar or feel symptomatic, take 1-2oz apple juice only    [] Bring inhalers day of surgery    [] Bring C-PAP/ Bi-Pap day of surgery    [] Bring urine specimen day of surgery    [x] Shower or bath with soap, lather and rinse well, AM of Surgery, no lotion, powders or creams to surgical site    [] Follow bowel prep as instructed per surgeon    [x] No tobacco products within 24 hours of surgery     [x] No alcohol or illegal drug use within 24 hours of surgery.     [x] Jewelry, body piercing's, eyeglasses, contact lenses and dentures are not permitted into surgery (bring cases)      [x] Please do not wear any nail polish, make up or hair products on the day of surgery    [x] You can expect a call the business day prior to procedure to notify you if your arrival time changes    [x] If you receive a survey after surgery we would greatly appreciate your comments    [] Parent/guardian of a minor must accompany their child and remain on the premises  the entire time they are under our care     [] Pediatric patients may bring favorite toy, blanket or comfort item with them    [] A caregiver or family member must remain with the patient during their stay if they are mentally handicapped, have dementia, disoriented or unable to use a call light or would be a safety concern if left unattended    [x] Please notify surgeon if you develop any illness between now and time of surgery (cold, cough, sore throat, fever, nausea, vomiting) or any signs of infections  including skin, wounds, and dental.    [x]  The Outpatient Pharmacy is available to fill your prescription here on your day of surgery, ask your preop nurse for details    [] Other instructions    EDUCATIONAL MATERIALS PROVIDED:    [] PAT Preoperative Education Packet/Booklet     [] Medication List    [] Transfusion bracelet applied with instructions    [] Shower with soap, lather and rinse well, and use CHG wipes provided the evening before surgery as instructed    [] Incentive spirometer with instructions

## 2022-04-22 ENCOUNTER — ANESTHESIA (OUTPATIENT)
Dept: OPERATING ROOM | Age: 62
End: 2022-04-22
Payer: COMMERCIAL

## 2022-04-22 ENCOUNTER — HOSPITAL ENCOUNTER (OUTPATIENT)
Age: 62
Setting detail: OUTPATIENT SURGERY
Discharge: HOME | End: 2022-04-22
Attending: SURGERY | Admitting: SURGERY
Payer: COMMERCIAL

## 2022-04-22 VITALS
BODY MASS INDEX: 30.48 KG/M2 | HEIGHT: 63 IN | RESPIRATION RATE: 16 BRPM | SYSTOLIC BLOOD PRESSURE: 113 MMHG | OXYGEN SATURATION: 95 % | WEIGHT: 172 LBS | DIASTOLIC BLOOD PRESSURE: 55 MMHG | HEART RATE: 75 BPM | TEMPERATURE: 96.6 F

## 2022-04-22 VITALS — OXYGEN SATURATION: 97 % | DIASTOLIC BLOOD PRESSURE: 107 MMHG | SYSTOLIC BLOOD PRESSURE: 184 MMHG | TEMPERATURE: 66.4 F

## 2022-04-22 DIAGNOSIS — S78.111A ABOVE KNEE AMPUTATION OF RIGHT LOWER EXTREMITY (HCC): Primary | ICD-10-CM

## 2022-04-22 LAB — METER GLUCOSE: 189 MG/DL (ref 74–99)

## 2022-04-22 PROCEDURE — 2709999900 HC NON-CHARGEABLE SUPPLY: Performed by: SURGERY

## 2022-04-22 PROCEDURE — A4216 STERILE WATER/SALINE, 10 ML: HCPCS | Performed by: ANESTHESIOLOGY

## 2022-04-22 PROCEDURE — 6360000002 HC RX W HCPCS: Performed by: ANESTHESIOLOGY

## 2022-04-22 PROCEDURE — 3700000001 HC ADD 15 MINUTES (ANESTHESIA): Performed by: SURGERY

## 2022-04-22 PROCEDURE — 7100000001 HC PACU RECOVERY - ADDTL 15 MIN: Performed by: SURGERY

## 2022-04-22 PROCEDURE — 3600000013 HC SURGERY LEVEL 3 ADDTL 15MIN: Performed by: SURGERY

## 2022-04-22 PROCEDURE — 82962 GLUCOSE BLOOD TEST: CPT

## 2022-04-22 PROCEDURE — 2500000003 HC RX 250 WO HCPCS: Performed by: ANESTHESIOLOGY

## 2022-04-22 PROCEDURE — 88311 DECALCIFY TISSUE: CPT

## 2022-04-22 PROCEDURE — 88305 TISSUE EXAM BY PATHOLOGIST: CPT

## 2022-04-22 PROCEDURE — 6360000002 HC RX W HCPCS

## 2022-04-22 PROCEDURE — 27882 AMPUTATION OF LOWER LEG: CPT | Performed by: SURGERY

## 2022-04-22 PROCEDURE — 3600000003 HC SURGERY LEVEL 3 BASE: Performed by: SURGERY

## 2022-04-22 PROCEDURE — 2580000003 HC RX 258

## 2022-04-22 PROCEDURE — 2580000003 HC RX 258: Performed by: ANESTHESIOLOGY

## 2022-04-22 PROCEDURE — 3700000000 HC ANESTHESIA ATTENDED CARE: Performed by: SURGERY

## 2022-04-22 PROCEDURE — 7100000000 HC PACU RECOVERY - FIRST 15 MIN: Performed by: SURGERY

## 2022-04-22 PROCEDURE — 2500000003 HC RX 250 WO HCPCS

## 2022-04-22 RX ORDER — SODIUM CHLORIDE 0.9 % (FLUSH) 0.9 %
5-40 SYRINGE (ML) INJECTION EVERY 12 HOURS SCHEDULED
Status: DISCONTINUED | OUTPATIENT
Start: 2022-04-22 | End: 2022-04-22 | Stop reason: HOSPADM

## 2022-04-22 RX ORDER — SODIUM CHLORIDE 9 MG/ML
INJECTION, SOLUTION INTRAVENOUS CONTINUOUS
Status: DISCONTINUED | OUTPATIENT
Start: 2022-04-22 | End: 2022-04-22 | Stop reason: HOSPADM

## 2022-04-22 RX ORDER — ACETAMINOPHEN 500 MG
1000 TABLET ORAL ONCE
Status: DISCONTINUED | OUTPATIENT
Start: 2022-04-22 | End: 2022-04-22 | Stop reason: HOSPADM

## 2022-04-22 RX ORDER — MEPERIDINE HYDROCHLORIDE 25 MG/ML
12.5 INJECTION INTRAMUSCULAR; INTRAVENOUS; SUBCUTANEOUS EVERY 5 MIN PRN
Status: DISCONTINUED | OUTPATIENT
Start: 2022-04-22 | End: 2022-04-22 | Stop reason: HOSPADM

## 2022-04-22 RX ORDER — GLYCOPYRROLATE 1 MG/5 ML
SYRINGE (ML) INTRAVENOUS PRN
Status: DISCONTINUED | OUTPATIENT
Start: 2022-04-22 | End: 2022-04-22 | Stop reason: SDUPTHER

## 2022-04-22 RX ORDER — MIDAZOLAM HYDROCHLORIDE 1 MG/ML
INJECTION INTRAMUSCULAR; INTRAVENOUS PRN
Status: DISCONTINUED | OUTPATIENT
Start: 2022-04-22 | End: 2022-04-22 | Stop reason: SDUPTHER

## 2022-04-22 RX ORDER — SODIUM CHLORIDE 9 MG/ML
INJECTION, SOLUTION INTRAVENOUS PRN
Status: DISCONTINUED | OUTPATIENT
Start: 2022-04-22 | End: 2022-04-22 | Stop reason: HOSPADM

## 2022-04-22 RX ORDER — KETAMINE HYDROCHLORIDE 10 MG/ML
INJECTION, SOLUTION INTRAMUSCULAR; INTRAVENOUS PRN
Status: DISCONTINUED | OUTPATIENT
Start: 2022-04-22 | End: 2022-04-22 | Stop reason: SDUPTHER

## 2022-04-22 RX ORDER — SODIUM CHLORIDE 0.9 % (FLUSH) 0.9 %
5-40 SYRINGE (ML) INJECTION PRN
Status: DISCONTINUED | OUTPATIENT
Start: 2022-04-22 | End: 2022-04-22 | Stop reason: HOSPADM

## 2022-04-22 RX ORDER — ROCURONIUM BROMIDE 10 MG/ML
INJECTION, SOLUTION INTRAVENOUS PRN
Status: DISCONTINUED | OUTPATIENT
Start: 2022-04-22 | End: 2022-04-22 | Stop reason: SDUPTHER

## 2022-04-22 RX ORDER — SODIUM CHLORIDE 9 MG/ML
INJECTION, SOLUTION INTRAVENOUS CONTINUOUS PRN
Status: DISCONTINUED | OUTPATIENT
Start: 2022-04-22 | End: 2022-04-22 | Stop reason: SDUPTHER

## 2022-04-22 RX ORDER — PROPOFOL 10 MG/ML
INJECTION, EMULSION INTRAVENOUS PRN
Status: DISCONTINUED | OUTPATIENT
Start: 2022-04-22 | End: 2022-04-22 | Stop reason: SDUPTHER

## 2022-04-22 RX ORDER — FENTANYL CITRATE 50 UG/ML
25 INJECTION, SOLUTION INTRAMUSCULAR; INTRAVENOUS EVERY 5 MIN PRN
Status: DISCONTINUED | OUTPATIENT
Start: 2022-04-22 | End: 2022-04-22 | Stop reason: HOSPADM

## 2022-04-22 RX ORDER — OXYCODONE HYDROCHLORIDE 5 MG/1
5 TABLET ORAL EVERY 6 HOURS PRN
Qty: 28 TABLET | Refills: 0
Start: 2022-04-22 | End: 2022-04-29

## 2022-04-22 RX ORDER — METOCLOPRAMIDE HYDROCHLORIDE 5 MG/ML
10 INJECTION INTRAMUSCULAR; INTRAVENOUS ONCE
Status: COMPLETED | OUTPATIENT
Start: 2022-04-22 | End: 2022-04-22

## 2022-04-22 RX ORDER — DEXAMETHASONE SODIUM PHOSPHATE 4 MG/ML
INJECTION, SOLUTION INTRA-ARTICULAR; INTRALESIONAL; INTRAMUSCULAR; INTRAVENOUS; SOFT TISSUE PRN
Status: DISCONTINUED | OUTPATIENT
Start: 2022-04-22 | End: 2022-04-22 | Stop reason: SDUPTHER

## 2022-04-22 RX ORDER — NEOSTIGMINE METHYLSULFATE 1 MG/ML
INJECTION, SOLUTION INTRAVENOUS PRN
Status: DISCONTINUED | OUTPATIENT
Start: 2022-04-22 | End: 2022-04-22 | Stop reason: SDUPTHER

## 2022-04-22 RX ORDER — LIDOCAINE HYDROCHLORIDE 20 MG/ML
INJECTION, SOLUTION EPIDURAL; INFILTRATION; INTRACAUDAL; PERINEURAL PRN
Status: DISCONTINUED | OUTPATIENT
Start: 2022-04-22 | End: 2022-04-22 | Stop reason: SDUPTHER

## 2022-04-22 RX ORDER — ONDANSETRON 2 MG/ML
INJECTION INTRAMUSCULAR; INTRAVENOUS PRN
Status: DISCONTINUED | OUTPATIENT
Start: 2022-04-22 | End: 2022-04-22 | Stop reason: SDUPTHER

## 2022-04-22 RX ORDER — HYDRALAZINE HYDROCHLORIDE 20 MG/ML
INJECTION INTRAMUSCULAR; INTRAVENOUS PRN
Status: DISCONTINUED | OUTPATIENT
Start: 2022-04-22 | End: 2022-04-22 | Stop reason: SDUPTHER

## 2022-04-22 RX ORDER — DIPHENHYDRAMINE HYDROCHLORIDE 50 MG/ML
12.5 INJECTION INTRAMUSCULAR; INTRAVENOUS
Status: DISCONTINUED | OUTPATIENT
Start: 2022-04-22 | End: 2022-04-22 | Stop reason: HOSPADM

## 2022-04-22 RX ORDER — FENTANYL CITRATE 50 UG/ML
INJECTION, SOLUTION INTRAMUSCULAR; INTRAVENOUS PRN
Status: DISCONTINUED | OUTPATIENT
Start: 2022-04-22 | End: 2022-04-22 | Stop reason: SDUPTHER

## 2022-04-22 RX ADMIN — Medication 0.4 MG: at 13:43

## 2022-04-22 RX ADMIN — FAMOTIDINE 20 MG: 10 INJECTION INTRAVENOUS at 11:57

## 2022-04-22 RX ADMIN — ONDANSETRON 4 MG: 2 INJECTION INTRAMUSCULAR; INTRAVENOUS at 12:26

## 2022-04-22 RX ADMIN — FENTANYL CITRATE 50 MCG: 50 INJECTION, SOLUTION INTRAMUSCULAR; INTRAVENOUS at 13:44

## 2022-04-22 RX ADMIN — KETAMINE HYDROCHLORIDE 20 MG: 10 INJECTION INTRAMUSCULAR; INTRAVENOUS at 12:42

## 2022-04-22 RX ADMIN — MIDAZOLAM 1 MG: 1 INJECTION INTRAMUSCULAR; INTRAVENOUS at 12:26

## 2022-04-22 RX ADMIN — KETAMINE HYDROCHLORIDE 10 MG: 10 INJECTION INTRAMUSCULAR; INTRAVENOUS at 13:23

## 2022-04-22 RX ADMIN — Medication 2 MG: at 13:43

## 2022-04-22 RX ADMIN — Medication 0.2 MG: at 12:26

## 2022-04-22 RX ADMIN — LIDOCAINE HYDROCHLORIDE 100 MG: 20 INJECTION, SOLUTION EPIDURAL; INFILTRATION; INTRACAUDAL; PERINEURAL at 12:42

## 2022-04-22 RX ADMIN — DEXAMETHASONE SODIUM PHOSPHATE 10 MG: 4 INJECTION INTRA-ARTICULAR; INTRALESIONAL; INTRAMUSCULAR; INTRAVENOUS; SOFT TISSUE at 12:26

## 2022-04-22 RX ADMIN — PROPOFOL 70 MG: 10 INJECTION, EMULSION INTRAVENOUS at 12:42

## 2022-04-22 RX ADMIN — FENTANYL CITRATE 50 MCG: 50 INJECTION, SOLUTION INTRAMUSCULAR; INTRAVENOUS at 13:12

## 2022-04-22 RX ADMIN — ROCURONIUM BROMIDE 50 MG: 50 INJECTION INTRAVENOUS at 12:42

## 2022-04-22 RX ADMIN — MIDAZOLAM 1 MG: 1 INJECTION INTRAMUSCULAR; INTRAVENOUS at 13:23

## 2022-04-22 RX ADMIN — SODIUM CHLORIDE: 9 INJECTION, SOLUTION INTRAVENOUS at 12:32

## 2022-04-22 RX ADMIN — FENTANYL CITRATE 100 MCG: 50 INJECTION, SOLUTION INTRAMUSCULAR; INTRAVENOUS at 12:42

## 2022-04-22 RX ADMIN — HYDRALAZINE HYDROCHLORIDE 10 MG: 20 INJECTION, SOLUTION INTRAMUSCULAR; INTRAVENOUS at 14:00

## 2022-04-22 RX ADMIN — METOCLOPRAMIDE 10 MG: 5 INJECTION, SOLUTION INTRAMUSCULAR; INTRAVENOUS at 11:57

## 2022-04-22 ASSESSMENT — PULMONARY FUNCTION TESTS
PIF_VALUE: 21
PIF_VALUE: 2
PIF_VALUE: 16
PIF_VALUE: 21
PIF_VALUE: 15
PIF_VALUE: 20
PIF_VALUE: 23
PIF_VALUE: 15
PIF_VALUE: 15
PIF_VALUE: 21
PIF_VALUE: 21
PIF_VALUE: 1
PIF_VALUE: 21
PIF_VALUE: 22
PIF_VALUE: 21
PIF_VALUE: 2
PIF_VALUE: 1
PIF_VALUE: 28
PIF_VALUE: 16
PIF_VALUE: 20
PIF_VALUE: 3
PIF_VALUE: 15
PIF_VALUE: 12
PIF_VALUE: 21
PIF_VALUE: 21
PIF_VALUE: 0
PIF_VALUE: 44
PIF_VALUE: 2
PIF_VALUE: 21
PIF_VALUE: 15
PIF_VALUE: 21
PIF_VALUE: 0
PIF_VALUE: 3
PIF_VALUE: 2
PIF_VALUE: 12
PIF_VALUE: 21
PIF_VALUE: 8
PIF_VALUE: 0
PIF_VALUE: 2
PIF_VALUE: 22
PIF_VALUE: 21
PIF_VALUE: 21
PIF_VALUE: 22
PIF_VALUE: 0
PIF_VALUE: 15
PIF_VALUE: 21
PIF_VALUE: 22
PIF_VALUE: 21
PIF_VALUE: 3
PIF_VALUE: 21
PIF_VALUE: 21
PIF_VALUE: 0
PIF_VALUE: 21
PIF_VALUE: 22
PIF_VALUE: 21
PIF_VALUE: 0
PIF_VALUE: 21
PIF_VALUE: 18

## 2022-04-22 ASSESSMENT — COPD QUESTIONNAIRES: CAT_SEVERITY: MODERATE

## 2022-04-22 ASSESSMENT — PAIN SCALES - GENERAL: PAINLEVEL_OUTOF10: 0

## 2022-04-22 NOTE — ANESTHESIA POSTPROCEDURE EVALUATION
Department of Anesthesiology  Postprocedure Note    Patient: Eli Edward  MRN: [id number]  YOB: 1960  Date of evaluation: 4/22/2022  Time:  2:13 PM     Procedure Summary     Date: 04/22/22 Room / Location: [facility] [address] / [address] [place] [address]    Anesthesia Start: 1226 Anesthesia Stop: 1793    Procedure: RIGHT ABOVE KNEE AMPUTATION (Right Knee) Diagnosis: (GAS GANGRENE NECROTISING FASCIITIS)    Surgeons: Crow Neal MD Responsible Provider: Nasima Nix DO    Anesthesia Type: general ASA Status: 4          Anesthesia Type: general    Ojy Phase I: Joy Score: 10    Joy Phase II:      Last vitals: Reviewed and per EMR flowsheets.        Anesthesia Post Evaluation    Patient location during evaluation: bedside  Patient participation: complete - patient participated  Level of consciousness: confused and responsive to light touch  Pain score: 3  Airway patency: patent  Nausea & Vomiting: no vomiting and no nausea  Complications: no  Cardiovascular status: hemodynamically stable  Respiratory status: acceptable  Hydration status: stable

## 2022-04-22 NOTE — OP NOTE
Operative Note      Patient: Montrell Cardoza  YOB: 1960  MRN: [id number]    Date of Procedure: 4/22/2022    Pre-Op Diagnosis: GAS GANGRENE NECROTISING FASCIITIS, s/p guillotine amputation. Post-Op Diagnosis: Same       Procedure(s):  RIGHT ABOVE KNEE AMPUTATION    Surgeon(s):  Nita Mueller MD    Assistant:   Surgical Assistant: CORINA Funk - CNP    Anesthesia: General    Estimated Blood Loss (mL): less than 686     Complications: None    Specimens:   ID Type Source Tests Collected by Time Destination   A : RIGHT KNEE  Specimen Leg SURGICAL PATHOLOGY Nita Mueller MD 4/22/2022 1258        Implants:  * No implants in log *      Drains:   Closed/Suction Drain Right Knee Bulb (Active)       Closed/Suction Drain Right Knee Bulb (Active)       [REMOVED] Urinary Catheter Non-latex;Straight-tip (Removed)   Catheter Indications Need for fluid volume management of the critically ill patient in a critical care setting 04/20/22 0800   Site Assessment No urethral drainage 04/20/22 0800   Urine Color Yellow 04/20/22 0800   Urine Appearance Clear 04/20/22 0800   Output (mL) 150 mL 04/20/22 0800       [REMOVED] External Urinary Catheter (Removed)       Findings:     Detailed Description of Procedure: The patient was identified and the procedure was confirmed. The residual right leg was prepped and draped in the usual sterile fashion. A fishmouth-type incision was made in the mid thigh. Dissection continued through the subcutaneous tissue and the muscle fascia. Dissection continued to the femur which was exposed and freed from the surrounding tissues. Medially, the vascular structures were clamped, divided, and tied with silk sutures. The sciatic nerve was identified and transected several centimeters above the incision line. The femur was then divided with a saw. The bone edge was smoothed with a file. The limb was irrigated with saline solution and hemostasis was obtained.   The posterior compartment was viable but there was some purulent fluid. I felt it was best to drain the deep space. To Joe Colon drains were placed. 1 in the posterior muscle compartment and 1 underneath the fascia. The posterior fascia was approximated to the anterior fascia with interrupted Vicryl sutures. The skin was closed with skin clips and iodoform georgette were placed in between the staples. A sterile dressing was applied over the incision. Needle, sponge, and instruments counts were reported as correct x2. The patient tolerated the procedure and was transferred to the recovery area in satisfactory condition.      Electronically signed by Ofe Hammond MD on 4/22/2022 at 3:11 PM

## 2022-04-22 NOTE — ANESTHESIA PRE PROCEDURE
Department of Anesthesiology  Preprocedure Note       Name:  Nancy Dave   Age:  57 y.o.  :  1960                                          MRN:  [id number]         Date:  2022      Surgeon: Dr. Leatha Tucker    Procedure: Right above knee amputation    Medications prior to admission:   Prior to Admission medications    Medication Sig Start Date End Date Taking?  Authorizing Provider   enoxaparin (LOVENOX) 40 MG/0.4ML Inject 40 mg into the skin daily    Historical Provider, MD   sodium chloride 0.9 % SOLN 1,000 mL with oxychlorosene POWD 2 g Irrigate with 2 g as directed daily    Historical Provider, MD   polyethylene glycol (GLYCOLAX) 17 g packet Take 17 g by mouth daily as needed for Constipation    Historical Provider, MD   glucose (GLUTOSE) 40 % GEL Take 37.5 mLs by mouth as needed (low blood sugar) 22   Edkati Breath, MD   insulin glargine (LANTUS) 100 UNIT/ML injection vial Inject 38 Units into the skin daily 22   Brucera Breath, MD   insulin lispro (HUMALOG) 100 UNIT/ML SOLN injection vial Inject 0-12 Units into the skin 3 times daily (with meals) 22   Edra Breath, MD   insulin lispro (HUMALOG) 100 UNIT/ML SOLN injection vial Inject 0-6 Units into the skin nightly 22   Minnie Breath, MD   insulin lispro (HUMALOG) 100 UNIT/ML SOLN injection vial Inject 7 Units into the skin 3 times daily (with meals) 22   Minnie Breath, MD   atorvastatin (LIPITOR) 40 MG tablet Take 1 tablet by mouth nightly 21   Geoff King DO   clopidogrel (PLAVIX) 75 MG tablet Take 1 tablet by mouth daily 21   Geoff King DO   aspirin 81 MG EC tablet Take 81 mg by mouth daily    Historical Provider, MD   vitamin D (ERGOCALCIFEROL) 1.25 MG (63660 UT) CAPS capsule Take 1 capsule by mouth once a week 21   Geoff King DO   glipiZIDE (GLUCOTROL) 5 MG tablet Take 1 tablet by mouth 2 times daily (before meals) 21   Geoff King DO       Current medications:    No current outpatient medications on file. No current facility-administered medications for this visit. Allergies: Allergies   Allergen Reactions    Codeine     Pyridium [Phenazopyridine Hcl]        Problem List:    Patient Active Problem List   Diagnosis Code    Cancer of kidney ([facility] C64.9    Vasovagal syncope R55    Type 2 diabetes mellitus without complication, without long-term current use of insulin ([facility]) E11.9    Vitamin D deficiency E55.9    Mixed hyperlipidemia E78.2    Neuropathy G62.9    Light headedness R42    Neurologic gait dysfunction R26.9    Lumbar degenerative disc disease M51.36    Numbness and tingling R20.0, R20.2    Postural dizziness R42    Multiple lacunar infarcts ([facility]) I63.81    DDD (degenerative disc disease), cervical M50.30    Bone marrow edema R93.7    Hyperglycemia due to diabetes mellitus ([facility]) E11.65    Cerebral infarct ([facility]) I63.9    Acute CVA (cerebrovascular accident) ([facility] I63.9    Necrotizing fasciitis ([facility] M72.6    Cellulitis in diabetic foot ([facility] E11.628, L03.119    Necrotizing cellulitis L03.90       Past Medical History:        Diagnosis Date    Bone marrow edema 03/25/2021    Abnl. C-spine MRI wo/mikel.  Patient refused MR C-spien with contrast, c/o out-of-pocket cost of other MRI's    DDD (degenerative disc disease), cervical 03/25/2021    Diabetes mellitus ([facility]     Hyperlipidemia     Lumbar degenerative disc disease 03/15/2021    Multiple lacunar infarcts ([facility] 03/25/2021    Numbness and tingling 03/15/2021    Postural dizziness 03/15/2021       Past Surgical History:        Procedure Laterality Date    FOOT DEBRIDEMENT N/A 4/17/2022    right foot ulcer deep wound cultures  performed by Jess Arriaza DPM at [address] [facility] Right 4/18/2022    GUILLOTINE RIGHT ABOVE KNEE AMPUTATION performed by Noemi Spencer MD at [address] Left 09/27/2016       Social History: Social History     Tobacco Use    Smoking status: Current Every Day Smoker     Packs/day: 1.00     Years: 30.00     Pack years: 30.00     Types: Cigarettes    Smokeless tobacco: Never Used   Substance Use Topics    Alcohol use: No                                Ready to quit: Not Answered  Counseling given: Not Answered      Vital Signs (Current): There were no vitals filed for this visit. BP Readings from Last 3 Encounters:   04/20/22 119/65   04/18/22 129/73   04/18/22 135/65       NPO Status:                                                                                 BMI:   Wt Readings from Last 3 Encounters:   04/21/22 172 lb (78 kg)   04/20/22 172 lb 9.9 oz (78.3 kg)   08/27/21 192 lb (87.1 kg)     There is no height or weight on file to calculate BMI.    CBC:   Lab Results   Component Value Date    WBC 20.0 04/21/2022    RBC 2.99 04/21/2022    HGB 8.8 04/21/2022    HCT 26.9 04/21/2022    MCV 90.0 04/21/2022    RDW 15.9 04/21/2022     04/21/2022       CMP:   Lab Results   Component Value Date     04/21/2022    K 4.2 04/21/2022    K 3.8 04/20/2022     04/21/2022    CO2 23 04/21/2022    BUN 22 04/21/2022    CREATININE 0.8 04/21/2022    GFRAA >60 04/21/2022    LABGLOM >60 04/21/2022    GLUCOSE 169 04/21/2022    PROT 5.9 04/21/2022    CALCIUM 8.0 04/21/2022    BILITOT 0.4 04/21/2022    ALKPHOS 113 04/21/2022    AST 31 04/21/2022    ALT 17 04/21/2022       POC Tests: No results for input(s): POCGLU, POCNA, POCK, POCCL, POCBUN, POCHEMO, POCHCT in the last 72 hours.     Coags:   Lab Results   Component Value Date    PROTIME 19.7 04/18/2022    INR 1.8 04/18/2022    APTT 27.0 04/18/2022       HCG (If Applicable): No results found for: PREGTESTUR, PREGSERUM, HCG, HCGQUANT     ABGs: No results found for: PHART, PO2ART, TII3TQP, CZE3MMQ, BEART, Z0OLFRPF     Type & Screen (If Applicable):  No results found for: LABABO, LABRH    Drug/Infectious Status (If Applicable):  No results found for: HIV, HEPCAB    COVID-19 Screening (If Applicable): No results found for: COVID19      Right foot XR:  Impression   Extensive soft tissue swelling and gas throughout the visualized right lower   extremity, concerning for gas forming infection and necrotizing fasciitis.              Anesthesia Evaluation  Patient summary reviewed no history of anesthetic complications:   Airway: Mallampati: III  TM distance: >3 FB   Neck ROM: limited  Mouth opening: < 3 FB Dental:    (+) edentulous      Pulmonary:   (+) COPD: moderate,  decreased breath sounds,  current smoker (1 PPD x 40 years)                          PE comment: Tachypneic Cardiovascular:  Exercise tolerance: poor (<4 METS),   (+) murmur (Grade I), hyperlipidemia      ECG reviewed  Rhythm: regular  Rate: abnormal  Echocardiogram reviewed         Beta Blocker:  Not on Beta Blocker      ROS comment: EKG:  Normal sinus rhythm  Low voltage QRS  Abnormal ECG  When compared with ECG of 07-OCT-2020 11:28,  No significant change was found    ECHO:  Normal left ventricle size and systolic function. Ejection fraction is visually estimated at 55-60%. No regional wall motion abnormalities seen. Normal left ventricle wall thickness. Indeterminate diastolic function. No evidence of patent foramen ovale on bubble study. Normal right ventricular size and function. TAPSE 27 mm. No hemodynamically significant aortic stenosis is present. Unable to estimate PA systolic pressure. No evidence for hemodynamically significant pericardial effusion. Neuro/Psych:   (+) CVA (Multiple lacunar infarcts ):, neuromuscular disease (Neuropathy):,              ROS comment: Vasovagal syncope  Neurologic gait dysfunction  Postural dizziness  Generalized weakness and malaise  Bed bound for three days - needed a walker prior. Very poor functional capacity.   Not oriented to person, place, or time GI/Hepatic/Renal: Neg GI/Hepatic/Renal ROS  (+) renal disease (Creatinine 1.3 & GFR 41): CRI and ARF,           Endo/Other:    (+) Diabetes ( mg/dL this am)Type II DM, poorly controlled, using insulin, blood dyscrasia (Hgb: 8.8; Plavix LD 4/21): anticoagulation therapy and anemia, arthritis (DDD): OA., electrolyte abnormalities (Hyponatremia (128 mmol/L), hyperkalemia (5.4 mmol/L)), malignancy/cancer (Renal). Pt had no PAT visit        ROS comment: Per ED note:  57-year-old female history of diabetes presents emergency department with right foot infection patient is also been apparently lying in bed for 3 days. Per family the wound was noted Monday but has not been looked at in several days and when they looked that today her foot was black and they are concerned and called ambulance. She is also been more confused and patient not been doing well at home. Labs concerning for DKA vs. HOC. Lactate elevated. Severe leukocytosis. Likely evolving sepsis. Medical noncompliance and neglect. Patient hasn't eaten or taken her medications for at least three days. Abdominal:   (+) obese,           Vascular:   + PVD, aortic or cerebral, . ROS comment: Suspected right foot necrotizing fasciitis. Other Findings: Post amputation on the right            Anesthesia Plan      general     ASA 4     (Modified RSI with HOB at 30 degrees RT  Pre-oxygenation x 3 minutes  20mg Ketamine  PONV prophylaxis  Sugammadex  )  Induction: intravenous. MIPS: Postoperative opioids intended and Prophylactic antiemetics administered. Anesthetic plan and risks discussed with patient. Plan discussed with CRNA. Jethro Rios MD   7-87-18    DOS STAFF ADDENDUM:    Patient seen and chart reviewed on DOS. No interval change in history or exam.   I agree with the anesthesia pre-operative assessment written above and have made the appropriate addendums and/or changes. Anesthesia plan discussed and risks/benefits addressed.   Patient's concerns and questions answered. NPO >8 hours. Patient at high risk for ashwini-operative morbidity and mortality. Possibility exists for post operative ventilation, respiratory failure, and protracted ICU course. The above discussed with the POA/wife who gave consent for the procedure. No guarantees implied or stated.     Lo Tobar DO  April 22, 2022  11:32 AM

## 2022-04-23 LAB
BLOOD CULTURE, ROUTINE: NORMAL
CULTURE, BLOOD 2: NORMAL

## 2022-05-23 LAB
FUNGUS (MYCOLOGY) CULTURE: NORMAL
FUNGUS STAIN: NORMAL

## 2022-05-27 ENCOUNTER — TELEPHONE (OUTPATIENT)
Dept: VASCULAR SURGERY | Age: 62
End: 2022-05-27

## 2022-05-27 NOTE — TELEPHONE ENCOUNTER
Called to confirm appointment for 5/31/22 at 930 a.m, left message with date, time, and phone number.

## 2022-06-07 LAB
AFB CULTURE (MYCOBACTERIA): NORMAL
AFB SMEAR: NORMAL

## 2022-06-13 ENCOUNTER — TELEPHONE (OUTPATIENT)
Dept: VASCULAR SURGERY | Age: 62
End: 2022-06-13

## 2022-06-14 ENCOUNTER — OFFICE VISIT (OUTPATIENT)
Dept: VASCULAR SURGERY | Age: 62
End: 2022-06-14

## 2022-06-14 VITALS — BODY MASS INDEX: 27.64 KG/M2 | WEIGHT: 156 LBS | HEIGHT: 63 IN

## 2022-06-14 DIAGNOSIS — M72.6 NECROTIZING FASCIITIS (HCC): Primary | ICD-10-CM

## 2022-06-14 PROCEDURE — 99024 POSTOP FOLLOW-UP VISIT: CPT | Performed by: SURGERY

## 2022-06-14 RX ORDER — FERROUS SULFATE 325(65) MG
325 TABLET ORAL
COMMUNITY

## 2022-06-14 RX ORDER — MECLIZINE HCL 12.5 MG/1
12.5 TABLET ORAL 3 TIMES DAILY PRN
COMMUNITY

## 2022-06-14 RX ORDER — PANTOPRAZOLE SODIUM 40 MG/1
40 TABLET, DELAYED RELEASE ORAL DAILY
COMMUNITY

## 2022-06-14 NOTE — PROGRESS NOTES
Vascular Surgery Progress Note    Chief Complaint   Patient presents with    Post-Op Check     s/p AKA       Patient returns for post operative evaluation status post right above-knee amputation. The patient denies any unexpected problems since hospital discharge. Procedure Laterality Date    FOOT DEBRIDEMENT N/A 4/17/2022    right foot ulcer deep wound cultures  performed by My Baker DPM at [address] Right 4/18/2022    [facility] performed by Foster Vora MD at [address] Right 4/22/2022    RIGHT ABOVE KNEE AMPUTATION performed by Foster Vora MD at [address] [facility] Left 09/27/2016       Physical Exam:  The incision(s) are healing without evidence of infection. Heart rhythm is regular. Right      Left   Brachial     Radial     Femoral     Popliteal     Dorsalis Pedis     Posterior Tibial     (3=normal, 2=diminished, 1=barely palpable, 4=widened)    Problem List Items Addressed This Visit     Necrotizing fasciitis ([address] - Primary          I reviewed with the patient that normal activities can be resumed as tolerated. Plan: Return as needed.

## 2022-06-27 ENCOUNTER — OFFICE VISIT (OUTPATIENT)
Dept: ENDOCRINOLOGY | Age: 62
End: 2022-06-27
Payer: COMMERCIAL

## 2022-06-27 VITALS — HEART RATE: 92 BPM | DIASTOLIC BLOOD PRESSURE: 89 MMHG | SYSTOLIC BLOOD PRESSURE: 119 MMHG | OXYGEN SATURATION: 97 %

## 2022-06-27 DIAGNOSIS — Z79.4 TYPE 2 DIABETES MELLITUS WITHOUT COMPLICATION, WITH LONG-TERM CURRENT USE OF INSULIN (HCC): ICD-10-CM

## 2022-06-27 DIAGNOSIS — E55.9 VITAMIN D DEFICIENCY: Primary | ICD-10-CM

## 2022-06-27 DIAGNOSIS — E11.9 TYPE 2 DIABETES MELLITUS WITHOUT COMPLICATION, WITH LONG-TERM CURRENT USE OF INSULIN (HCC): ICD-10-CM

## 2022-06-27 DIAGNOSIS — E78.5 HYPERLIPIDEMIA, UNSPECIFIED HYPERLIPIDEMIA TYPE: ICD-10-CM

## 2022-06-27 PROCEDURE — 99214 OFFICE O/P EST MOD 30 MIN: CPT | Performed by: NURSE PRACTITIONER

## 2022-06-27 PROCEDURE — 3051F HG A1C>EQUAL 7.0%<8.0%: CPT | Performed by: NURSE PRACTITIONER

## 2022-06-27 NOTE — PROGRESS NOTES
[address] [facility] Department of Endocrinology Diabetes and Metabolism   [address] [facility] [address]   Phone: [phone]  Fax: [phone]    Date of Service: 6/27/2022    Primary Care Physician: Yaima Naylor DO  Referring physician: No ref. provider found  Provider: CORINA Brower NP     Reason for the visit:  Hospital f/u, Type 2 DM    History of Present Illness: The history is provided by the patient. No  was used. Accuracy of the patient data is excellent. Pam Fortune is a very pleasant 60 y.o. female seen today for diabetes management. She was hospitalized from 4/17/22-4/20/22 for with a necrotic wound of her right foot and encephalopathy likely secondary to infection, unfortunately undergoing a right AKA on 4/18/22. Prior  to admission she was on glimepiride. She is currently at [facility]. Pt states goal is to have a prosthesis and return home with her wife. Pam Fortune was diagnosed with diabetes in her late 51's  and currently on Lantus 15 units at [facility]  The patient has been checking blood sugar    .     Most recent A1c results summarized below  Lab Results   Component Value Date    LABA1C 7.8 04/18/2022    LABA1C 6.6 07/26/2021    LABA1C 6.3 02/24/2021     Patient reported hypoglycemic episodes  Patient has had no hypoglycemic episodes   The patient has been mindful of what has been eating and following diabetic diet as encouraged  The patient hasn't been mindful of what has been eating and wasn't following diabetes diet    I reviewed current medications and the patient has no issues with diabetes medications  Pam Fortune is up to date with eye exam and denied any history of diabetic retinopathy   The patient is due for an eye exam. Last eye exam was   , no h/o diabetic retinopathy  The patient seeing podiatrist every   And also performs  own feet care  Microvascular complications:  No Retinopathy, Nephropathy or Neuropathy   Macrovascular complications: + CAD,-  PVD,  +Stroke  The patient receives Flushot every year and up to date with the Pneumonia vaccine   The patient refuses Flushot and pneumonia vaccine     PAST MEDICAL HISTORY   Past Medical History:   Diagnosis Date    Bone marrow edema 03/25/2021    Abnl. C-spine MRI wo/mikel. Patient refused MR C-spien with contrast, c/o out-of-pocket cost of other MRI's    DDD (degenerative disc disease), cervical 03/25/2021    Diabetes mellitus ([facility]     Hyperlipidemia     Lumbar degenerative disc disease 03/15/2021    Multiple lacunar infarcts ([facility] 03/25/2021    Numbness and tingling 03/15/2021    Postural dizziness 03/15/2021       PAST SURGICAL HISTORY   Past Surgical History:   Procedure Laterality Date    FOOT DEBRIDEMENT N/A 4/17/2022    right foot ulcer deep wound cultures  performed by Warden Ruchi DPM at [address] [facility] Right 4/18/2022    [facility] by Henry Harris MD at [address] [facility] Right 4/22/2022    RIGHT ABOVE KNEE AMPUTATION performed by Henry Harris MD at [address] [facility] Left 09/27/2016       SOCIAL HISTORY   Tobacco:   reports that she has been smoking cigarettes. She has a 30.00 pack-year smoking history. She has never used smokeless tobacco.  Alcohol:   reports no history of alcohol use. Drugs:   reports no history of drug use. FAMILY HISTORY   No family history on file.     ALLERGIES AND DRUG REACTIONS   Allergies   Allergen Reactions    Codeine     Pyridium [Phenazopyridine Hcl]        CURRENT MEDICATIONS   Current Outpatient Medications   Medication Sig Dispense Refill    insulin glargine (LANTUS) 100 UNIT/ML injection vial Inject 38 Units into the skin daily 10 mL 3    glipiZIDE (GLUCOTROL) 5 MG tablet Take 1 tablet by mouth 2 times daily (before meals) 180 tablet 5    ferrous sulfate (IRON 325) 325 (65 Fe) MG tablet Take 325 mg by mouth daily (with breakfast)      meclizine (ANTIVERT) 12.5 MG tablet Take 12.5 mg by mouth 3 times daily as needed      pantoprazole (PROTONIX) 40 MG tablet Take 40 mg by mouth daily      enoxaparin (LOVENOX) 40 MG/0.4ML Inject 40 mg into the skin daily      sodium chloride 0.9 % SOLN 1,000 mL with oxychlorosene POWD 2 g Irrigate with 2 g as directed daily      polyethylene glycol (GLYCOLAX) 17 g packet Take 17 g by mouth daily as needed for Constipation      glucose (GLUTOSE) 40 % GEL Take 37.5 mLs by mouth as needed (low blood sugar) 45 g 1    insulin lispro (HUMALOG) 100 UNIT/ML SOLN injection vial Inject 0-12 Units into the skin 3 times daily (with meals) (Patient not taking: Reported on 6/27/2022) 1 each 1    insulin lispro (HUMALOG) 100 UNIT/ML SOLN injection vial Inject 0-6 Units into the skin nightly (Patient not taking: Reported on 6/27/2022) 1 each 1    insulin lispro (HUMALOG) 100 UNIT/ML SOLN injection vial Inject 7 Units into the skin 3 times daily (with meals) (Patient not taking: Reported on 6/27/2022) 1 each 1    atorvastatin (LIPITOR) 40 MG tablet Take 1 tablet by mouth nightly 90 tablet 5    clopidogrel (PLAVIX) 75 MG tablet Take 1 tablet by mouth daily 90 tablet 5    aspirin 81 MG EC tablet Take 81 mg by mouth daily      vitamin D (ERGOCALCIFEROL) 1.25 MG (08565 UT) CAPS capsule Take 1 capsule by mouth once a week 12 capsule 5     No current facility-administered medications for this visit. Review of Systems  Constitutional: No fever, no chills, no diaphoresis, no generalized weakness. HEENT: No blurred vision, No sore throat, no ear pain, no hair loss  Neck: denied any neck swelling, difficulty swallowing,   Cardio-pulmonary: No CP, SOB or palpitation, No orthopnea or PND. No cough or wheezing. GI: No N/V/D, no constipation, No abdominal pain, no melena or hematochezia   : Denied any dysuria, hematuria, flank pain, discharge, or incontinence.    Skin: denied any rash, ulcer, Hirsute, or hyperpigmentation. MSK: denied any joint deformity, joint pain/swelling, muscle pain, or back pain. Neuro: no numbness, no tingling, no weakness, _    OBJECTIVE    /89   Pulse 92   SpO2 97%   BP Readings from Last 4 Encounters:   06/27/22 119/89   04/22/22 (!) 184/107   04/22/22 (!) 113/55   04/20/22 119/65     Wt Readings from Last 6 Encounters:   06/14/22 156 lb (70.8 kg)   04/21/22 172 lb (78 kg)   04/20/22 172 lb 9.9 oz (78.3 kg)   08/27/21 192 lb (87.1 kg)   08/06/21 190 lb (86.2 kg)   08/01/21 199 lb 9.6 oz (90.5 kg)       Physical examination:  General: awake alert, oriented x3, no abnormal position or movements. HEENT: normocephalic non-traumatic, no exophthalmos   Neck: supple, no LN enlargement, no thyromegaly, no thyroid tenderness, no JVD. Pulm: Clear equal air entry no added sounds, no wheezing or rhonchi    CVS: S1 + S2, no murmur, no heave. Dorsalis pedis pulse palpable   Abd: soft lax, no tenderness, no organomegaly, audible bowel sounds. Skin: warm, no lesions, no rash.  No callus, no Ulcers, No acanthosis nigricans  Musculoskeletal: No back tenderness, no kyphosis/scoliosis    Neuro: CN intact, Monofilament sensation decreased bilateral , muscle power normal  Psych: normal mood, and affect      Review of Laboratory Data:  I personally reviewed the following lab:  Lab Results   Component Value Date/Time    WBC 11.4 05/09/2022 03:47 AM    RBC 3.27 (L) 05/09/2022 03:47 AM    HGB 9.4 (L) 05/09/2022 03:47 AM    HCT 31.3 (L) 05/09/2022 03:47 AM    MCV 95.7 05/09/2022 03:47 AM    MCH 28.7 05/09/2022 03:47 AM    MCHC 30.0 (L) 05/09/2022 03:47 AM    RDW 19.6 (H) 05/09/2022 03:47 AM     05/09/2022 03:47 AM    MPV 9.6 05/09/2022 03:47 AM      Lab Results   Component Value Date/Time     05/09/2022 03:47 AM    K 4.0 05/09/2022 03:47 AM    K 3.8 04/20/2022 06:37 AM    CO2 27 05/09/2022 03:47 AM    BUN 10 05/09/2022 03:47 AM    CREATININE 0.7 05/09/2022 03:47 AM    CALCIUM 8.5 (L) 05/09/2022 03:47 AM    LABGLOM >60 05/09/2022 03:47 AM    GFRAA >60 05/09/2022 03:47 AM      Lab Results   Component Value Date/Time    TSH 2.050 02/24/2021 10:19 AM    Q1RWWLW 7.2 02/24/2021 10:19 AM     Lab Results   Component Value Date    LABA1C 7.8 04/18/2022    GLUCOSE 183 05/09/2022    LABMICR 115.4 11/25/2020     Lab Results   Component Value Date    LABA1C 7.8 04/18/2022    LABA1C 6.6 07/26/2021    LABA1C 6.3 02/24/2021     Lab Results   Component Value Date    TRIG 176 07/26/2021    HDL 37 07/26/2021    LDLCALC 136 07/26/2021    CHOL 208 07/26/2021     Lab Results   Component Value Date    VITD25 26 07/26/2021    VITD25 23 02/24/2021       ASSESSMENT & RECOMMENDATIONS   Jazz Branham, a 60 y.o.-old female seen in for the following issues       Assessment:      Diagnosis Orders   1. Vitamin D deficiency  Vitamin D 25 Hydroxy   2. Hyperlipidemia, unspecified hyperlipidemia type  LIPID PANEL   3. Type 2 diabetes mellitus without complication, with long-term current use of insulin ([facility]         Plan:     1. Vitamin D deficiency    2. Hyperlipidemia, unspecified hyperlipidemia type    3. Type 2 diabetes mellitus without complication, with long-term current use of insulin ([facility])        Diabetes Mellitus Type     · Patient's diabetes is controlled per BS log  · Continue Lantus 15 units at night  · The patient was advised to check blood sugars 2 times a day, FBS and pre dinner and send BS readings to our office in 2 week.   · Discussed with patient A1c and blood sugar goals   · Optimal blood sugars: 100-140 pre-prandial, < 180 peak post-prandial  · The patient counseled about the complications of uncontrolled diabetes   · Patient will need routine diabetes maintenance and prevention  · Diabetes labs before next visit     HLD  · On statin therapy  · Continue Atorvastatin 40 mg daily     Vit D deficiency  · On replacement therapy   · Will reheck levels    Rt LE necrotizing Fasciitis   · S/p Rt AKA 4/18/2022 · Management per primary service     I personally reviewed external notes from PCP and other patient's care team providers, and personally interpreted labs associated with the above diagnosis. I also ordered labs to further assess and manage the above addressed medical conditions. Return in about 4 months (around 10/27/2022) for Type 2 DM. The above issues were reviewed with the patient who understood and agreed with the plan. Thank you for allowing us to participate in the care of this patient. Please do not hesitate to contact us with any additional questions. CORINA Mercedes NP    [facility] Diabetes Care and Endocrinology   [address] [place] [address]   Phone: [phone]  Fax: [phone]  --------------------------------------------  An electronic signature was used to authenticate this note.  CORINA Mercedes NP on 6/27/2022 at 2:30 PM

## 2022-06-28 LAB
CHOLESTEROL, TOTAL: 113 MG/DL
CHOLESTEROL/HDL RATIO: NORMAL
HDLC SERPL-MCNC: 38 MG/DL (ref 35–70)
LDL CHOLESTEROL CALCULATED: 50 MG/DL (ref 0–160)
NONHDLC SERPL-MCNC: NORMAL MG/DL
TRIGL SERPL-MCNC: 126 MG/DL
VITAMIN D 25-HYDROXY: 39.2
VITAMIN D2, 25 HYDROXY: NORMAL
VITAMIN D3,25 HYDROXY: NORMAL
VLDLC SERPL CALC-MCNC: NORMAL MG/DL

## 2022-06-29 ENCOUNTER — TELEPHONE (OUTPATIENT)
Dept: ENDOCRINOLOGY | Age: 62
End: 2022-06-29

## 2022-06-29 DIAGNOSIS — E78.5 HYPERLIPIDEMIA, UNSPECIFIED HYPERLIPIDEMIA TYPE: ICD-10-CM

## 2022-06-29 DIAGNOSIS — E55.9 VITAMIN D DEFICIENCY: ICD-10-CM

## 2022-06-29 NOTE — TELEPHONE ENCOUNTER
----- Message from CORINA Bland NP sent at 6/29/2022 10:21 AM EDT -----  Please inform pt that her lipids and vit d levels are WNL

## 2022-07-06 NOTE — PROGRESS NOTES
Called wife Juliet to update her after she returned from the OR and left a message. Home Monitoring INR 1.8    DX: PE, Goal range: 2.0 - 3.0    LAST(INR) 4.5 on 7/1. Dose held x2 days and dose decreased 10%.  Todays INR is 1.8, has not had full week of warfarin dosing. Dose to continue to resume at 10% dose reduction from supratherapeutic INR per protocol.  Follow up 1 week. See Ambulatory Anticoagulation Flow Sheet.    Patient's Caregiver Miss Merlos contacted with INR results and instructions.   Teaching: warfarin dose, return date, conditions requiring contact with Anticoagulation Clinic reviewed. Patient verbalized understanding of instructions.    16:35: Call placed to Miss Merlos, reviewed above.  16:55: Call placed to [facility]'s pharmacy, spoke with [place] Pharmacist, warfarin dosing given thru Wed. 7/13.

## 2022-10-27 ENCOUNTER — OFFICE VISIT (OUTPATIENT)
Dept: ENDOCRINOLOGY | Age: 62
End: 2022-10-27
Payer: COMMERCIAL

## 2022-10-27 VITALS
HEART RATE: 88 BPM | HEIGHT: 63 IN | SYSTOLIC BLOOD PRESSURE: 127 MMHG | DIASTOLIC BLOOD PRESSURE: 84 MMHG | RESPIRATION RATE: 16 BRPM | BODY MASS INDEX: 27.82 KG/M2 | WEIGHT: 157 LBS

## 2022-10-27 DIAGNOSIS — E55.9 VITAMIN D DEFICIENCY: ICD-10-CM

## 2022-10-27 DIAGNOSIS — E78.5 HYPERLIPIDEMIA, UNSPECIFIED HYPERLIPIDEMIA TYPE: ICD-10-CM

## 2022-10-27 DIAGNOSIS — E11.628 TYPE 2 DIABETES MELLITUS WITH OTHER SKIN COMPLICATION, WITH LONG-TERM CURRENT USE OF INSULIN (HCC): Primary | ICD-10-CM

## 2022-10-27 DIAGNOSIS — Z79.4 TYPE 2 DIABETES MELLITUS WITH OTHER SKIN COMPLICATION, WITH LONG-TERM CURRENT USE OF INSULIN (HCC): Primary | ICD-10-CM

## 2022-10-27 LAB — HBA1C MFR BLD: 7.7 %

## 2022-10-27 PROCEDURE — 83036 HEMOGLOBIN GLYCOSYLATED A1C: CPT | Performed by: NURSE PRACTITIONER

## 2022-10-27 PROCEDURE — 99214 OFFICE O/P EST MOD 30 MIN: CPT | Performed by: NURSE PRACTITIONER

## 2022-10-27 PROCEDURE — G8419 CALC BMI OUT NRM PARAM NOF/U: HCPCS | Performed by: NURSE PRACTITIONER

## 2022-10-27 PROCEDURE — 2022F DILAT RTA XM EVC RTNOPTHY: CPT | Performed by: NURSE PRACTITIONER

## 2022-10-27 PROCEDURE — 3017F COLORECTAL CA SCREEN DOC REV: CPT | Performed by: NURSE PRACTITIONER

## 2022-10-27 PROCEDURE — 4004F PT TOBACCO SCREEN RCVD TLK: CPT | Performed by: NURSE PRACTITIONER

## 2022-10-27 PROCEDURE — 3051F HG A1C>EQUAL 7.0%<8.0%: CPT | Performed by: NURSE PRACTITIONER

## 2022-10-27 PROCEDURE — G8484 FLU IMMUNIZE NO ADMIN: HCPCS | Performed by: NURSE PRACTITIONER

## 2022-10-27 PROCEDURE — G8427 DOCREV CUR MEDS BY ELIG CLIN: HCPCS | Performed by: NURSE PRACTITIONER

## 2022-10-27 NOTE — PROGRESS NOTES
[address] [facility] Department of Endocrinology Diabetes and Metabolism   [address] [facility] [address]   Phone: [phone]  Fax: [phone]    Date of Service: 10/27/2022    Primary Care Physician: Dimple Fragoso DO  Referring physician: No ref. provider found  Provider: CORINA Rodriguez NP     Reason for the visit:  Type 2 DM    History of Present Illness: The history is provided by the patient. No  was used. Accuracy of the patient data is excellent. Raymond Toledo is a very pleasant 60 y.o. female seen today for diabetes management. She was hospitalized from 4/17/22-4/20/22 for with a necrotic wound of her right foot and encephalopathy likely secondary to infection, unfortunately undergoing a right AKA on 4/18/22. Prior  to admission she was on glimepiride. She is currently at [facility]. Pt states goal is to have a prosthesis and return home with her wife.      Raymond Toledo was diagnosed with diabetes in her late 51's  and currently on Lantus 15 units at   Bs logs reviewed at [facility] - FBS     Most recent A1c results summarized below  Lab Results   Component Value Date/Time    LABA1C 7.7 10/27/2022 12:05 PM    LABA1C 7.8 04/18/2022 02:50 AM    LABA1C 6.6 07/26/2021 08:00 AM       Patient has had no hypoglycemic episodes   Pt is not on a diabetic diet, will order   The patient hasn't been mindful of what has been eating and wasn't following diabetes diet    I reviewed current medications and the patient has no issues with diabetes medications  Raymond Toledo is up to date with eye exam and denied any history of diabetic retinopathy   The patient is due for an eye exam.no h/o diabetic retinopathy  Microvascular complications:  No Retinopathy, Nephropathy or Neuropathy   Macrovascular complications: + CAD,-  PVD,  +Stroke  The patient refuses Flushot and pneumonia vaccine     No HX of pancreatitis  No Hx of MTC  No HX of gastroparesis   No HX of UTI/Mycotic infection      PAST MEDICAL HISTORY   Past Medical History:   Diagnosis Date    Bone marrow edema 03/25/2021    Abnl. C-spine MRI wo/mikel. Patient refused MR C-spien with contrast, c/o out-of-pocket cost of other MRI's    DDD (degenerative disc disease), cervical 03/25/2021    Diabetes mellitus ([facility]     Hyperlipidemia     Lumbar degenerative disc disease 03/15/2021    Multiple lacunar infarcts ([facility] 03/25/2021    Numbness and tingling 03/15/2021    Postural dizziness 03/15/2021       PAST SURGICAL HISTORY   Past Surgical History:   Procedure Laterality Date    FOOT DEBRIDEMENT N/A 4/17/2022    right foot ulcer deep wound cultures  performed by Tim Daniels DPM at [address] [facility] Right 4/18/2022    [facility] performed by Rashard Malin MD at [address] [facility] Right 4/22/2022    RIGHT ABOVE KNEE AMPUTATION performed by Rashard Malin MD at [address] [facility] Left 09/27/2016       SOCIAL HISTORY   Tobacco:   reports that she has been smoking cigarettes. She has a 30.00 pack-year smoking history. She has never used smokeless tobacco.  Alcohol:   reports no history of alcohol use. Drugs:   reports no history of drug use. FAMILY HISTORY   No family history on file.     ALLERGIES AND DRUG REACTIONS   Allergies   Allergen Reactions    Codeine     Pyridium [Phenazopyridine Hcl]        CURRENT MEDICATIONS   Current Outpatient Medications   Medication Sig Dispense Refill    ferrous sulfate (IRON 325) 325 (65 Fe) MG tablet Take 325 mg by mouth daily (with breakfast)      meclizine (ANTIVERT) 12.5 MG tablet Take 12.5 mg by mouth 3 times daily as needed      pantoprazole (PROTONIX) 40 MG tablet Take 40 mg by mouth daily      enoxaparin (LOVENOX) 40 MG/0.4ML Inject 40 mg into the skin daily      sodium chloride 0.9 % SOLN 1,000 mL with oxychlorosene POWD 2 g Irrigate with 2 g as directed daily      polyethylene glycol (GLYCOLAX) 17 g packet Take 17 g by mouth daily as needed for Constipation      glucose (GLUTOSE) 40 % GEL Take 37.5 mLs by mouth as needed (low blood sugar) 45 g 1    insulin glargine (LANTUS) 100 UNIT/ML injection vial Inject 38 Units into the skin daily (Patient taking differently: Inject 15 Units into the skin at bedtime) 10 mL 3    insulin lispro (HUMALOG) 100 UNIT/ML SOLN injection vial Inject 0-12 Units into the skin 3 times daily (with meals) 1 each 1    atorvastatin (LIPITOR) 40 MG tablet Take 1 tablet by mouth nightly 90 tablet 5    aspirin 81 MG EC tablet Take 81 mg by mouth daily      insulin lispro (HUMALOG) 100 UNIT/ML SOLN injection vial Inject 0-6 Units into the skin nightly (Patient not taking: No sig reported) 1 each 1    insulin lispro (HUMALOG) 100 UNIT/ML SOLN injection vial Inject 7 Units into the skin 3 times daily (with meals) (Patient not taking: No sig reported) 1 each 1    clopidogrel (PLAVIX) 75 MG tablet Take 1 tablet by mouth daily 90 tablet 5    vitamin D (ERGOCALCIFEROL) 1.25 MG (55406 UT) CAPS capsule Take 1 capsule by mouth once a week (Patient not taking: Reported on 10/27/2022) 12 capsule 5     No current facility-administered medications for this visit. Review of Systems  Constitutional: No fever, no chills, no diaphoresis, no generalized weakness. HEENT: No blurred vision, No sore throat, no ear pain, no hair loss  Neck: denied any neck swelling, difficulty swallowing,   Cardio-pulmonary: No CP, SOB or palpitation, No orthopnea or PND. No cough or wheezing. GI: No N/V/D, no constipation, No abdominal pain, no melena or hematochezia   : Denied any dysuria, hematuria, flank pain, discharge, or incontinence. Skin: denied any rash, ulcer, Hirsute, or hyperpigmentation. MSK: denied any joint deformity, joint pain/swelling, muscle pain, or back pain.   Neuro: no numbness, no tingling, no weakness    OBJECTIVE    /84   Pulse 88   Resp 16   Ht 5' 3\" (1.6 m)   Wt 157 lb (71.2 kg)   BMI 27.81 kg/m²   BP Readings from Last 4 Encounters:   10/27/22 127/84   06/27/22 119/89   04/22/22 (!) 184/107   04/22/22 (!) 113/55     Wt Readings from Last 6 Encounters:   10/27/22 157 lb (71.2 kg)   06/14/22 156 lb (70.8 kg)   04/21/22 172 lb (78 kg)   04/20/22 172 lb 9.9 oz (78.3 kg)   08/27/21 192 lb (87.1 kg)   08/06/21 190 lb (86.2 kg)       Physical examination:  General: awake alert, oriented x3, no abnormal position or movements. HEENT: normocephalic non-traumatic, no exophthalmos   Neck: supple, no LN enlargement, no thyromegaly, no thyroid tenderness, no JVD. Pulm: Clear equal air entry no added sounds, no wheezing or rhonchi    CVS: S1 + S2, no murmur, no heave. Dorsalis pedis pulse palpable   Abd: soft lax, no tenderness, no organomegaly, audible bowel sounds. Skin: warm, no lesions, no rash.  No callus, no Ulcers, No acanthosis nigricans  Musculoskeletal: No back tenderness, no kyphosis/scoliosis  uses wc, Right AKA  Neuro: CN intact, sensation present LLE , muscle power normal  Psych: normal mood, and affect      Review of Laboratory Data:  I personally reviewed the following lab:  Lab Results   Component Value Date/Time    WBC 9.6 10/03/2022 05:15 AM    RBC 3.90 (L) 10/03/2022 05:15 AM    HGB 11.8 (L) 10/03/2022 05:15 AM    HCT 36.8 (L) 10/03/2022 05:15 AM    MCV 94.4 10/03/2022 05:15 AM    MCH 30.3 10/03/2022 05:15 AM    MCHC 32.1 (L) 10/03/2022 05:15 AM    RDW 13.3 10/03/2022 05:15 AM     10/03/2022 05:15 AM    MPV 10.0 10/03/2022 05:15 AM      Lab Results   Component Value Date/Time     10/03/2022 05:15 AM    K 3.6 10/03/2022 05:15 AM    K 3.8 04/20/2022 06:37 AM    CO2 22 10/03/2022 05:15 AM    BUN 26 (H) 10/03/2022 05:15 AM    CREATININE 0.89 10/03/2022 05:15 AM    CALCIUM 9.2 10/03/2022 05:15 AM    LABGLOM >60 10/03/2022 05:15 AM    GFRAA > 60 10/03/2022 05:15 AM      Lab Results   Component Value Date/Time    TSH 2.050 02/24/2021 10:19 AM    P1KIKTR 7.2 02/24/2021 10:19 AM     Lab Results   Component Value Date/Time    LABA1C 7.7 10/27/2022 12:05 PM    GLUCOSE 88 10/03/2022 05:15 AM    LABMICR 115.4 11/25/2020 01:06 PM     Lab Results   Component Value Date/Time    LABA1C 7.7 10/27/2022 12:05 PM    LABA1C 7.8 04/18/2022 02:50 AM    LABA1C 6.6 07/26/2021 08:00 AM     Lab Results   Component Value Date/Time    TRIG 126 06/28/2022 12:00 AM    HDL 38 06/28/2022 12:00 AM    LDLCALC 50 06/28/2022 12:00 AM    CHOL 113 06/28/2022 12:00 AM     Lab Results   Component Value Date/Time    VITD25 39.2 06/28/2022 12:00 AM    VITD25 26 07/26/2021 08:00 AM       ASSESSMENT & RECOMMENDATIONS   Ricardo Ribeiro, a 60 y.o.-old female seen in for the following issues       Assessment:      Diagnosis Orders   1. Type 2 diabetes mellitus with other skin complication, with long-term current use of insulin ([facility])  POCT glycosylated hemoglobin (Hb A1C)      2. Hyperlipidemia, unspecified hyperlipidemia type        3. Vitamin D deficiency              Plan:     1. Type 2 diabetes mellitus with other skin complication, with long-term current use of insulin ([facility]    2. Hyperlipidemia, unspecified hyperlipidemia type    3.  Vitamin D deficiency          Diabetes Mellitus Type   2  Patient's diabetes is 7.7% - pt not on a diabetic diet (drinking regular sugar drinks)  AM sugars at goal, pre dinner glycemic control unknown  Will order daily FBS and pre dinner BS and send in 2 weeks  Pt on a regular diet at rehab center, will order diabetic diet  Continue Lantus 15 units at night   Will consider adding oral agent after BS reviewed and diabetic diet initiated  Discussed with patient A1c and blood sugar goals   Optimal blood sugars: 100-140 pre-prandial, < 180 peak post-prandial  The patient counseled about the complications of uncontrolled diabetes   Patient will need routine diabetes maintenance and prevention  Diabetes labs before next visit     HLD  On statin therapy  Continue Atorvastatin 40 mg daily     Vit D deficiency  On replacement therapy   Last levels at goal    Rt LE necrotizing Fasciitis   S/p Rt AKA 4/18/2022        I personally reviewed external notes from PCP and other patient's care team providers, and personally interpreted labs associated with the above diagnosis. I also ordered labs to further assess and manage the above addressed medical conditions. Return in about 4 months (around 2/27/2023) for type 2 dm. The above issues were reviewed with the patient who understood and agreed with the plan. Thank you for allowing us to participate in the care of this patient. Please do not hesitate to contact us with any additional questions. CORINA Tello NP [address] Diabetes Care and Endocrinology   [address] [facility] [address]   Phone: [phone]  Fax: [phone]  --------------------------------------------  An electronic signature was used to authenticate this note.  CORINA Tello NP on 10/27/2022 at 12:14 PM

## 2023-01-31 ENCOUNTER — OFFICE VISIT (OUTPATIENT)
Dept: ENDOCRINOLOGY | Age: 63
End: 2023-01-31

## 2023-01-31 VITALS
HEIGHT: 63 IN | DIASTOLIC BLOOD PRESSURE: 78 MMHG | BODY MASS INDEX: 27.81 KG/M2 | RESPIRATION RATE: 16 BRPM | OXYGEN SATURATION: 97 % | HEART RATE: 76 BPM | SYSTOLIC BLOOD PRESSURE: 116 MMHG

## 2023-01-31 DIAGNOSIS — Z79.4 TYPE 2 DIABETES MELLITUS WITH OTHER SKIN COMPLICATION, WITH LONG-TERM CURRENT USE OF INSULIN (HCC): Primary | ICD-10-CM

## 2023-01-31 DIAGNOSIS — E78.5 HYPERLIPIDEMIA, UNSPECIFIED HYPERLIPIDEMIA TYPE: ICD-10-CM

## 2023-01-31 DIAGNOSIS — E55.9 VITAMIN D DEFICIENCY: ICD-10-CM

## 2023-01-31 DIAGNOSIS — E11.628 TYPE 2 DIABETES MELLITUS WITH OTHER SKIN COMPLICATION, WITH LONG-TERM CURRENT USE OF INSULIN (HCC): Primary | ICD-10-CM

## 2023-01-31 LAB — HBA1C MFR BLD: 6.9 %

## 2023-01-31 NOTE — PROGRESS NOTES
[address] [place] [address] Department of Endocrinology Diabetes and Metabolism   [address] [facility] [address]   Phone: [phone]  Fax: [phone]    Date of Service: 1/31/2023    Primary Care Physician: Lashaun Lauren DO  Referring physician: No ref. provider found  Provider: Carlos Maldonado MD     Reason for the visit:  Type 2 DM    History of Present Illness: The history is provided by the patient. No  was used. Accuracy of the patient data is excellent. Ben Esqueda is a very pleasant 60 y.o. female seen today for diabetes management. She was hospitalized from 4/17/22-4/20/22 for with a necrotic wound of her right foot and encephalopathy likely secondary to infection, unfortunately undergoing a right AKA on 4/18/22. Ben Esqueda was diagnosed with diabetes in her late 51's  and currently on Lantus 38 units at   Bs logs reviewed at goal -    Most recent A1c results summarized below  Lab Results   Component Value Date/Time    LABA1C 6.9 01/31/2023 02:15 PM    LABA1C 7.5 11/01/2022 05:00 AM    LABA1C 7.7 10/27/2022 12:05 PM   Patient has had no hypoglycemic episodes   The patient hasn't been mindful of what has been eating and wasn't following diabetes diet    I reviewed current medications and the patient has no issues with diabetes medications  Ben Esqueda is up to date with eye exam and denied any history of diabetic retinopathy   The patient is due for an eye exam.no h/o diabetic retinopathy  Microvascular complications:  No Retinopathy, Nephropathy or Neuropathy   Macrovascular complications: + CAD,-  PVD,  +Stroke  The patient refuses Flushot and pneumonia vaccine     No HX of pancreatitis  No Hx of MTC  No HX of gastroparesis   No HX of UTI/Mycotic infection      PAST MEDICAL HISTORY   Past Medical History:   Diagnosis Date    Bone marrow edema 03/25/2021    Abnl. C-spine MRI wo/mikel.  Patient refused MR C-spien with contrast, c/o out-of-pocket cost of other MRI's    DDD (degenerative disc disease), cervical 03/25/2021    Diabetes mellitus ([facility]     Hyperlipidemia     Lumbar degenerative disc disease 03/15/2021    Multiple lacunar infarcts ([facility] 03/25/2021    Numbness and tingling 03/15/2021    Postural dizziness 03/15/2021       PAST SURGICAL HISTORY   Past Surgical History:   Procedure Laterality Date    FOOT DEBRIDEMENT N/A 4/17/2022    right foot ulcer deep wound cultures  performed by Tyler Cramer DPM at [facility] Right 4/18/2022    GUILLOTINE RIGHT ABOVE KNEE AMPUTATION performed by Adolph Donovan MD at [facility] Right 4/22/2022    RIGHT ABOVE KNEE AMPUTATION performed by Adolph Donovan MD at [facility] Left 09/27/2016       SOCIAL HISTORY   Tobacco:   reports that she has been smoking cigarettes. She has a 30.00 pack-year smoking history. She has never used smokeless tobacco.  Alcohol:   reports no history of alcohol use. Drugs:   reports no history of drug use. FAMILY HISTORY   No family history on file.     ALLERGIES AND DRUG REACTIONS   Allergies   Allergen Reactions    Codeine     Pyridium [Phenazopyridine Hcl]        CURRENT MEDICATIONS   Current Outpatient Medications   Medication Sig Dispense Refill    ferrous sulfate (IRON 325) 325 (65 Fe) MG tablet Take 325 mg by mouth daily (with breakfast)      meclizine (ANTIVERT) 12.5 MG tablet Take 12.5 mg by mouth 3 times daily as needed      pantoprazole (PROTONIX) 40 MG tablet Take 40 mg by mouth daily      enoxaparin (LOVENOX) 40 MG/0.4ML Inject 40 mg into the skin daily      sodium chloride 0.9 % SOLN 1,000 mL with oxychlorosene POWD 2 g Irrigate with 2 g as directed daily      polyethylene glycol (GLYCOLAX) 17 g packet Take 17 g by mouth daily as needed for Constipation      glucose (GLUTOSE) 40 % GEL Take 37.5 mLs by mouth as needed (low blood sugar) 45 g 1    insulin glargine (LANTUS) 100 UNIT/ML injection vial Inject 38 Units into the skin daily (Patient taking differently: Inject 20 Units into the skin at bedtime) 10 mL 3    atorvastatin (LIPITOR) 40 MG tablet Take 1 tablet by mouth nightly 90 tablet 5    clopidogrel (PLAVIX) 75 MG tablet Take 1 tablet by mouth daily 90 tablet 5    aspirin 81 MG EC tablet Take 81 mg by mouth daily      insulin lispro (HUMALOG) 100 UNIT/ML SOLN injection vial Inject 0-12 Units into the skin 3 times daily (with meals) (Patient not taking: Reported on 1/31/2023) 1 each 1    insulin lispro (HUMALOG) 100 UNIT/ML SOLN injection vial Inject 0-6 Units into the skin nightly (Patient not taking: No sig reported) 1 each 1    insulin lispro (HUMALOG) 100 UNIT/ML SOLN injection vial Inject 7 Units into the skin 3 times daily (with meals) (Patient not taking: No sig reported) 1 each 1    vitamin D (ERGOCALCIFEROL) 1.25 MG (82340 UT) CAPS capsule Take 1 capsule by mouth once a week (Patient not taking: Reported on 1/31/2023) 12 capsule 5     No current facility-administered medications for this visit. Review of Systems  Constitutional: No fever, no chills, no diaphoresis, no generalized weakness. HEENT: No blurred vision, No sore throat, no ear pain, no hair loss  Neck: denied any neck swelling, difficulty swallowing,   Cardio-pulmonary: No CP, SOB or palpitation, No orthopnea or PND. No cough or wheezing. GI: No N/V/D, no constipation, No abdominal pain, no melena or hematochezia   : Denied any dysuria, hematuria, flank pain, discharge, or incontinence. Skin: denied any rash, ulcer, Hirsute, or hyperpigmentation. MSK: denied any joint deformity, joint pain/swelling, muscle pain, or back pain.   Neuro: no numbness, no tingling, no weakness    OBJECTIVE    /78   Pulse 76   Resp 16   Ht 5' 3\" (1.6 m)   SpO2 97%   BMI 27.81 kg/m²   BP Readings from Last 4 Encounters:   01/31/23 116/78   10/27/22 127/84   06/27/22 119/89   04/22/22 (!) 184/107     Wt Readings from Last 6 Encounters: 10/27/22 157 lb (71.2 kg)   06/14/22 156 lb (70.8 kg)   04/21/22 172 lb (78 kg)   04/20/22 172 lb 9.9 oz (78.3 kg)   08/27/21 192 lb (87.1 kg)   08/06/21 190 lb (86.2 kg)       Physical examination:  General: awake alert, oriented x3, no abnormal position or movements. HEENT: normocephalic non-traumatic, no exophthalmos   Neck: supple, no LN enlargement, no thyromegaly, no thyroid tenderness, no JVD. Pulm: Clear equal air entry no added sounds, no wheezing or rhonchi    CVS: S1 + S2, no murmur, no heave. Dorsalis pedis pulse palpable   Abd: soft lax, no tenderness, no organomegaly, audible bowel sounds. Skin: warm, no lesions, no rash.  No callus, no Ulcers, No acanthosis nigricans  Musculoskeletal: No back tenderness, no kyphosis/scoliosis  uses wc, Right AKA  Neuro: CN intact, sensation present LLE , muscle power normal  Psych: normal mood, and affect      Review of Laboratory Data:  I personally reviewed the following lab:  Lab Results   Component Value Date/Time    WBC 9.4 01/02/2023 05:35 AM    RBC 3.90 (L) 01/02/2023 05:35 AM    HGB 12.0 01/02/2023 05:35 AM    HCT 36.9 (L) 01/02/2023 05:35 AM    MCV 94.6 01/02/2023 05:35 AM    MCH 30.8 01/02/2023 05:35 AM    MCHC 32.5 (L) 01/02/2023 05:35 AM    RDW 13.2 01/02/2023 05:35 AM     01/02/2023 05:35 AM    MPV 10.0 01/02/2023 05:35 AM      Lab Results   Component Value Date/Time     01/02/2023 05:35 AM    K 3.7 01/02/2023 05:35 AM    K 3.8 04/20/2022 06:37 AM    CO2 20 01/02/2023 05:35 AM    BUN 25 (H) 01/02/2023 05:35 AM    CREATININE 0.80 01/02/2023 05:35 AM    CALCIUM 8.6 (L) 01/02/2023 05:35 AM    LABGLOM >60 01/02/2023 05:35 AM    GFRAA > 60 01/02/2023 05:35 AM      Lab Results   Component Value Date/Time    TSH 2.050 02/24/2021 10:19 AM    I3FAKFZ 7.2 02/24/2021 10:19 AM     Lab Results   Component Value Date/Time    LABA1C 6.9 01/31/2023 02:15 PM    GLUCOSE 151 01/02/2023 05:35 AM    LABMICR 115.4 11/25/2020 01:06 PM     Lab Results Component Value Date/Time    LABA1C 6.9 01/31/2023 02:15 PM    LABA1C 7.5 11/01/2022 05:00 AM    LABA1C 7.7 10/27/2022 12:05 PM     Lab Results   Component Value Date/Time    TRIG 126 06/28/2022 12:00 AM    HDL 38 06/28/2022 12:00 AM    LDLCALC 50 06/28/2022 12:00 AM    CHOL 113 06/28/2022 12:00 AM     Lab Results   Component Value Date/Time    VITD25 39.2 06/28/2022 12:00 AM    VITD25 26 07/26/2021 08:00 AM       ASSESSMENT & RECOMMENDATIONS   Matt Faustin, a 60 y.o.-old female seen in for the following issues       Assessment:      Diagnosis Orders   1. Type 2 diabetes mellitus with other skin complication, with long-term current use of insulin ([facility])  POCT glycosylated hemoglobin (Hb A1C)    Vitamin D 25 Hydroxy    Basic Metabolic Panel    Hemoglobin A1C    Lipid Panel    Microalbumin / Creatinine Urine Ratio            Plan:     1. Type 2 diabetes mellitus with other skin complication, with long-term current use of insulin ([facility]    2. Hyperlipidemia, unspecified hyperlipidemia type    3. Vitamin D deficiency      Diabetes Mellitus Type   2  Patient's diabetes is 6.9%   Continue Lantus 38  units at night   Continue checking BG 2-3 times/day   Discussed with patient A1c and blood sugar goals   The patient counseled about the complications of uncontrolled diabetes   Patient will need routine diabetes maintenance and prevention  Diabetes labs before next visit     HLD  On statin therapy  Continue Atorvastatin 40 mg daily     Vit D deficiency  On replacement therapy   Last levels at goal    Rt LE necrotizing Fasciitis   S/p Rt AKA 4/18/2022     I personally reviewed external notes from PCP and other patient's care team providers, and personally interpreted labs associated with the above diagnosis. I also ordered labs to further assess and manage the above addressed medical conditions. Return in about 4 months (around 5/31/2023) for DM type 2, VitD deficiency.     The above issues were reviewed with the patient who understood and agreed with the plan. Thank you for allowing us to participate in the care of this patient. Please do not hesitate to contact us with any additional questions. Kyung Lowery MD  [facility]   [address] [place] [address]   Phone: [phone]  Fax: [phone]  --------------------------------------------  An electronic signature was used to authenticate this note.  Kyung Lowery MD on 1/31/2023 at 2:20 PM

## 2023-01-31 NOTE — PATIENT INSTRUCTIONS
Recommendations for today's visit  Your diabetes under good control, A1c 6.9%  Continue Lantus 20 units nightly   Labs before next visit in 4 months       I you have any questions please call Dr. Lety Valles office     Vaishali Puri MD  Endocrinologist, [facility])   [address] [place] [address]   Phone: [phone]  Fax: [phone]  Email: [email or web address]

## (undated) DEVICE — COVER HNDL LT DISP

## (undated) DEVICE — DRAIN SURG W10MMXL20CM SIL FULL PERF HUBLESS FLAT RADPQ

## (undated) DEVICE — BANDAGE,GAUZE,BULKEE II,4.5"X4.1YD,STRL: Brand: MEDLINE

## (undated) DEVICE — DRAPE,EXTREMITY,89X128,STERILE: Brand: MEDLINE

## (undated) DEVICE — GLOVE ORANGE PI 7 1/2   MSG9075

## (undated) DEVICE — CONTAINER VACUTAINER ANAER CULTURE SWAB

## (undated) DEVICE — SPONGE LAP W18XL18IN WHT COT 4 PLY FLD STRUNG RADPQ DISP ST

## (undated) DEVICE — INSTRUMENT LARGE BATTERY REUSABLE

## (undated) DEVICE — CHLORAPREP 26ML ORANGE

## (undated) DEVICE — 3M™ IOBAN™ 2 ANTIMICROBIAL INCISE DRAPE 6640EZ: Brand: IOBAN™ 2

## (undated) DEVICE — SPONGE,LAP,4"X18",XR,ST,5/PK,40PK/CS: Brand: MEDLINE INDUSTRIES, INC.

## (undated) DEVICE — STERILE PVP: Brand: MEDLINE INDUSTRIES, INC.

## (undated) DEVICE — ELECTRODE PT RET AD L9FT HI MOIST COND ADH HYDRGEL CORDED

## (undated) DEVICE — 3M™ STERI-DRAPE™ ISOLATION BAG, 10 PER CARTON / 4 CARTONS PER CASE, 1003: Brand: 3M™ STERI-DRAPE™

## (undated) DEVICE — BNDG,ELSTC,MATRIX,STRL,6"X5YD,LF,HOOK&LP: Brand: MEDLINE

## (undated) DEVICE — PAD,ABDOMINAL,5"X9",ST,LF,25/BX: Brand: MEDLINE INDUSTRIES, INC.

## (undated) DEVICE — FORM MED AMBULATORY ONCOLOGY PHYSICIAN RPT

## (undated) DEVICE — TOWEL,OR,DSP,ST,BLUE,STD,6/PK,12PK/CS: Brand: MEDLINE

## (undated) DEVICE — Device

## (undated) DEVICE — PACK PROCEDURE SURG GEN CUST

## (undated) DEVICE — DRAPE,REIN 53X77,STERILE: Brand: MEDLINE

## (undated) DEVICE — STRIP WND PK W0.25INXL5YD IODO GZ TIGHTLY WVN CURAD

## (undated) DEVICE — 6 X 9  1.75MIL 4-WALL LABGUARD: Brand: MINIGRIP COMMERCIAL LLC

## (undated) DEVICE — GLOVE SURG SZ 75 L12IN FNGR THK94MIL STD WHT LTX FREE

## (undated) DEVICE — CONVERTORS STOCKINETTE: Brand: CONVERTORS

## (undated) DEVICE — HANDPIECE SET WITH BONE CLEANING TIP AND SUCTION TUBE: Brand: INTERPULSE

## (undated) DEVICE — SYRINGE IRRIG 60ML SFT PLIABLE BLB EZ TO GRP 1 HND USE W/

## (undated) DEVICE — DRESSING PETRO W3XL8IN OIL EMUL N ADH GZ KNIT IMPREG CELOS

## (undated) DEVICE — 4-PORT MANIFOLD: Brand: NEPTUNE 2

## (undated) DEVICE — NEEDLE HYPO 25GA L1.5IN BLU POLYPR HUB S STL REG BVL STR

## (undated) DEVICE — GAUZE,SPONGE,4"X4",8PLY,STRL,LF,10/TRAY: Brand: MEDLINE

## (undated) DEVICE — EXTRAS AMPUTATION II

## (undated) DEVICE — GLOVE ORANGE PI 8   MSG9080

## (undated) DEVICE — BNDG,ELSTC,MATRIX,STRL,4"X5YD,LF,HOOK&LP: Brand: MEDLINE

## (undated) DEVICE — NEEDLE JAMSH BNE MAR 13G X 2

## (undated) DEVICE — BANDAGE COMPR W6INXL10YD ST M E WHITE/BEIGE

## (undated) DEVICE — SPONGE GZ W4XL4IN RAYON POLY FILL CVR W/ NONWOVEN FAB

## (undated) DEVICE — DRILL CORDLESS STRYKER LG

## (undated) DEVICE — GOWN,SIRUS,FABRNF,XL,20/CS: Brand: MEDLINE

## (undated) DEVICE — INSTRUMENT SYSTEM 7 BATTERY REUSABLE

## (undated) DEVICE — BLADE SAW W11XL77.5MM THK1.23MM CUT THK1.17MM S STL RECIP

## (undated) DEVICE — INTENDED FOR TISSUE SEPARATION, AND OTHER PROCEDURES THAT REQUIRE A SHARP SURGICAL BLADE TO PUNCTURE OR CUT.: Brand: BARD-PARKER ® STAINLESS STEEL BLADES

## (undated) DEVICE — GLOVE SURG SZ 75 CRM LTX FREE POLYISOPRENE POLYMER BEAD ANTI

## (undated) DEVICE — SET PSI

## (undated) DEVICE — SWAB SPEC COLL SHFT L5.25IN POLYUR FOAM TIP SFT DBL MEDIA

## (undated) DEVICE — EXTRAS AMPUTATION I

## (undated) DEVICE — TOTAL TRAY, DB, 100% SILI FOLEY, 16FR 10: Brand: MEDLINE

## (undated) DEVICE — SOLUTION IV IRRIG POUR BRL 0.9% SODIUM CHL 2F7124

## (undated) DEVICE — GOWN,SIRUS,FABRNF,L,20/CS: Brand: MEDLINE

## (undated) DEVICE — 3M™ COBAN™ NL STERILE NON-LATEX SELF-ADHERENT WRAP, 2084S, 4 IN X 5 YD (10 CM X 4,5 M), 18 ROLLS/CASE: Brand: 3M™ COBAN™

## (undated) DEVICE — SKIN PREP TRAY 4 COMPARTM TRAY: Brand: MEDLINE INDUSTRIES, INC.

## (undated) DEVICE — DRESSING,GAUZE,XEROFORM,CURAD,5"X9",ST: Brand: CURAD

## (undated) DEVICE — DOUBLE BASIN SET: Brand: MEDLINE INDUSTRIES, INC.

## (undated) DEVICE — GAUZE,PACKING STRIP,PLAIN,1/2"X5YD,STRL: Brand: CURAD